# Patient Record
Sex: FEMALE | Race: WHITE | Employment: UNEMPLOYED | URBAN - METROPOLITAN AREA
[De-identification: names, ages, dates, MRNs, and addresses within clinical notes are randomized per-mention and may not be internally consistent; named-entity substitution may affect disease eponyms.]

---

## 2017-03-29 PROBLEM — R10.2 CHRONIC PELVIC PAIN IN FEMALE: Status: ACTIVE | Noted: 2017-03-29

## 2017-03-29 PROBLEM — G89.29 CHRONIC PELVIC PAIN IN FEMALE: Status: ACTIVE | Noted: 2017-03-29

## 2018-03-14 ENCOUNTER — HOSPITAL ENCOUNTER (OUTPATIENT)
Dept: PSYCHIATRY | Age: 38
Setting detail: THERAPIES SERIES
Discharge: HOME | End: 2018-03-14
Payer: MEDICAID

## 2018-03-14 PROCEDURE — 90853 GROUP PSYCHOTHERAPY: CPT | Performed by: COUNSELOR

## 2018-03-21 ENCOUNTER — APPOINTMENT (OUTPATIENT)
Dept: PSYCHIATRY | Age: 38
End: 2018-03-21
Payer: MEDICAID

## 2018-03-28 ENCOUNTER — APPOINTMENT (OUTPATIENT)
Dept: PSYCHIATRY | Age: 38
End: 2018-03-28
Payer: MEDICAID

## 2018-08-07 ENCOUNTER — HOSPITAL ENCOUNTER (OUTPATIENT)
Age: 38
Discharge: HOME | End: 2018-08-07
Payer: MEDICAID

## 2018-08-07 LAB
ALBUMIN SERPL-MCNC: 3.8 G/DL (ref 3.5–5.2)
ALP BLD-CCNC: 100 U/L (ref 35–104)
ALT SERPL-CCNC: 25 U/L (ref 0–32)
ANION GAP SERPL CALCULATED.3IONS-SCNC: 8 MMOL/L (ref 7–16)
AST SERPL-CCNC: 17 U/L (ref 0–31)
BASOPHILS ABSOLUTE: 0.04 E9/L (ref 0–0.2)
BASOPHILS RELATIVE PERCENT: 0.6 % (ref 0–2)
BILIRUB SERPL-MCNC: 0.2 MG/DL (ref 0–1.2)
BILIRUBIN URINE: NEGATIVE
BLOOD, URINE: NEGATIVE
BUN BLDV-MCNC: 9 MG/DL (ref 6–20)
CALCIUM SERPL-MCNC: 9.1 MG/DL (ref 8.6–10.2)
CHLORIDE BLD-SCNC: 104 MMOL/L (ref 98–107)
CHOLESTEROL, FASTING: 162 MG/DL (ref 0–199)
CLARITY: CLEAR
CO2: 27 MMOL/L (ref 22–29)
COLOR: YELLOW
CREAT SERPL-MCNC: 1.1 MG/DL (ref 0.5–1)
EOSINOPHILS ABSOLUTE: 0.09 E9/L (ref 0.05–0.5)
EOSINOPHILS RELATIVE PERCENT: 1.3 % (ref 0–6)
GFR AFRICAN AMERICAN: >60
GFR NON-AFRICAN AMERICAN: 55 ML/MIN/1.73
GLUCOSE FASTING: 114 MG/DL (ref 74–109)
GLUCOSE URINE: NEGATIVE MG/DL
HBA1C MFR BLD: 6.1 % (ref 4–5.6)
HCT VFR BLD CALC: 40.5 % (ref 34–48)
HDLC SERPL-MCNC: 37 MG/DL
HEMOGLOBIN: 13.4 G/DL (ref 11.5–15.5)
IMMATURE GRANULOCYTES #: 0.03 E9/L
IMMATURE GRANULOCYTES %: 0.4 % (ref 0–5)
KETONES, URINE: NEGATIVE MG/DL
LDL CHOLESTEROL CALCULATED: 106 MG/DL (ref 0–99)
LEUKOCYTE ESTERASE, URINE: NEGATIVE
LYMPHOCYTES ABSOLUTE: 1.48 E9/L (ref 1.5–4)
LYMPHOCYTES RELATIVE PERCENT: 22.2 % (ref 20–42)
MCH RBC QN AUTO: 29.6 PG (ref 26–35)
MCHC RBC AUTO-ENTMCNC: 33.1 % (ref 32–34.5)
MCV RBC AUTO: 89.4 FL (ref 80–99.9)
MONOCYTES ABSOLUTE: 0.4 E9/L (ref 0.1–0.95)
MONOCYTES RELATIVE PERCENT: 6 % (ref 2–12)
NEUTROPHILS ABSOLUTE: 4.64 E9/L (ref 1.8–7.3)
NEUTROPHILS RELATIVE PERCENT: 69.5 % (ref 43–80)
NITRITE, URINE: NEGATIVE
PDW BLD-RTO: 13.9 FL (ref 11.5–15)
PH UA: 6 (ref 5–9)
PLATELET # BLD: 270 E9/L (ref 130–450)
PMV BLD AUTO: 10.6 FL (ref 7–12)
POTASSIUM SERPL-SCNC: 4.4 MMOL/L (ref 3.5–5)
PROTEIN UA: NEGATIVE MG/DL
RBC # BLD: 4.53 E12/L (ref 3.5–5.5)
SODIUM BLD-SCNC: 139 MMOL/L (ref 132–146)
SPECIFIC GRAVITY UA: 1.01 (ref 1–1.03)
T3 TOTAL: 122.7 NG/DL (ref 80–200)
T4 TOTAL: 5.7 MCG/DL (ref 4.5–11.7)
TOTAL PROTEIN: 6.7 G/DL (ref 6.4–8.3)
TRIGLYCERIDE, FASTING: 97 MG/DL (ref 0–149)
TSH SERPL DL<=0.05 MIU/L-ACNC: 1.84 UIU/ML (ref 0.27–4.2)
UROBILINOGEN, URINE: 0.2 E.U./DL
VITAMIN B-12: 297 PG/ML (ref 211–946)
VITAMIN D 25-HYDROXY: 18 NG/ML (ref 30–100)
VLDLC SERPL CALC-MCNC: 19 MG/DL
WBC # BLD: 6.7 E9/L (ref 4.5–11.5)

## 2018-08-07 PROCEDURE — 82607 VITAMIN B-12: CPT

## 2018-08-07 PROCEDURE — 80053 COMPREHEN METABOLIC PANEL: CPT

## 2018-08-07 PROCEDURE — 84443 ASSAY THYROID STIM HORMONE: CPT

## 2018-08-07 PROCEDURE — 83036 HEMOGLOBIN GLYCOSYLATED A1C: CPT

## 2018-08-07 PROCEDURE — 80061 LIPID PANEL: CPT

## 2018-08-07 PROCEDURE — 84480 ASSAY TRIIODOTHYRONINE (T3): CPT

## 2018-08-07 PROCEDURE — 36415 COLL VENOUS BLD VENIPUNCTURE: CPT

## 2018-08-07 PROCEDURE — 85025 COMPLETE CBC W/AUTO DIFF WBC: CPT

## 2018-08-07 PROCEDURE — 82306 VITAMIN D 25 HYDROXY: CPT

## 2018-08-07 PROCEDURE — 84436 ASSAY OF TOTAL THYROXINE: CPT

## 2018-08-07 PROCEDURE — 81003 URINALYSIS AUTO W/O SCOPE: CPT

## 2018-10-12 ENCOUNTER — HOSPITAL ENCOUNTER (EMERGENCY)
Age: 38
Discharge: HOME | End: 2018-10-12
Attending: EMERGENCY MEDICINE
Payer: MEDICAID

## 2018-10-12 VITALS
TEMPERATURE: 98.1 F | DIASTOLIC BLOOD PRESSURE: 80 MMHG | HEIGHT: 61 IN | SYSTOLIC BLOOD PRESSURE: 132 MMHG | HEART RATE: 92 BPM | RESPIRATION RATE: 18 BRPM | BODY MASS INDEX: 43.23 KG/M2 | OXYGEN SATURATION: 97 % | WEIGHT: 229 LBS

## 2018-10-12 DIAGNOSIS — M76.51 PATELLAR TENDONITIS OF RIGHT KNEE: ICD-10-CM

## 2018-10-12 DIAGNOSIS — R21 RASH AND OTHER NONSPECIFIC SKIN ERUPTION: Primary | ICD-10-CM

## 2018-10-12 PROCEDURE — 99282 EMERGENCY DEPT VISIT SF MDM: CPT

## 2018-10-12 PROCEDURE — G0381 LEV 2 HOSP TYPE B ED VISIT: HCPCS

## 2018-10-12 RX ORDER — IBUPROFEN 600 MG/1
600 TABLET ORAL EVERY 6 HOURS PRN
Qty: 120 TABLET | Refills: 0 | Status: SHIPPED | OUTPATIENT
Start: 2018-10-12 | End: 2019-01-09

## 2018-10-12 NOTE — ED PROVIDER NOTES
smokeless tobacco. She reports that she does not drink alcohol or use drugs. Family History: family history includes Diabetes in her father; Heart Disease in her father and mother; High Blood Pressure in her father; Kidney Disease in her father; Other in her mother; Stroke in her father. Allergies: Patient has no known allergies. Physical Exam           ED Triage Vitals [10/12/18 1743]   BP Temp Temp src Pulse Resp SpO2 Height Weight   -- 98.1 °F (36.7 °C) -- 92 18 97 % 5' 1\" (1.549 m) 229 lb (103.9 kg)     Oxygen Saturation Interpretation: Normal.    Constitutional:  Alert, development consistent with age. HEENT:  NC/NT. Airway patent. Eyes:  PERRL, EOMI, no discharge. Ears:  TMs without perforation, injection, or bulging. External canals clear without exudate. Mouth:  Mucous membranes moist without lesions, tongue and gums normal.  Throat:  Pharynx without injection, exudate, or tonsillar hypertrophy. Airway patient. Neck:  Supple. No lymphadenopathy. Respiratory:  Clear to auscultation and breath sounds equal.  CV:  Regular rate and rhythm. GI:  Abdomen Soft, nontender, +BS. Integument:  Skin turgor: Normal.              SMALL MACUPAPULAR LESIONS LEFT FORARM, ABDOMEN, AND BEHIND HER KNEE , NO HIVES, DRY MILD ERYTHEMATOUS. RIGHT KNEE TENDER BELOW KNEE CAP OVER PATELLAR TENDON   Neurological:  Orientation age-appropriate unless noted elseware. Motor functions intact. Lab / Imaging Results   (All laboratory and radiology results have been personally reviewed by myself)  Labs:  No results found for this visit on 10/12/18. Imaging: All Radiology results interpreted by Radiologist unless otherwise noted. No orders to display       ED Course / Medical Decision Making   Medications - No data to display     Consults:   None    Procedures:   none    MDM:       Counseling:     The emergency provider has spoken with the patient and discussed todays results, in addition to providing specific details for the plan of care and counseling regarding the diagnosis and prognosis. Questions are answered at this time and they are agreeable with the planAND TREATMENT  Assessment      1. Rash and other nonspecific skin eruption    2. Patellar tendonitis of right knee      Plan   Discharge to home  Patient condition is stable    New Medications     New Prescriptions    No medications on file     Electronically signed by Trinity Clancy MD   DD: 10/12/18  **This report was transcribed using voice recognition software. Every effort was made to ensure accuracy; however, inadvertent computerized transcription errors may be present.   END OF ED PROVIDER NOTE     Trinity Clancy MD  10/12/18 0844

## 2018-11-16 ENCOUNTER — HOSPITAL ENCOUNTER (OUTPATIENT)
Age: 38
Discharge: HOME | End: 2018-11-16
Payer: MEDICAID

## 2018-11-16 LAB
ALBUMIN SERPL-MCNC: 4.1 G/DL (ref 3.5–5.2)
ALP BLD-CCNC: 99 U/L (ref 35–104)
ALT SERPL-CCNC: 19 U/L (ref 0–32)
ANION GAP SERPL CALCULATED.3IONS-SCNC: 7 MMOL/L (ref 7–16)
AST SERPL-CCNC: 16 U/L (ref 0–31)
BILIRUB SERPL-MCNC: 0.3 MG/DL (ref 0–1.2)
BUN BLDV-MCNC: 11 MG/DL (ref 6–20)
CALCIUM SERPL-MCNC: 9.4 MG/DL (ref 8.6–10.2)
CHLORIDE BLD-SCNC: 102 MMOL/L (ref 98–107)
CO2: 28 MMOL/L (ref 22–29)
CREAT SERPL-MCNC: 1.1 MG/DL (ref 0.5–1)
GFR AFRICAN AMERICAN: >60
GFR NON-AFRICAN AMERICAN: 55 ML/MIN/1.73
GLUCOSE BLD-MCNC: 112 MG/DL (ref 74–99)
POTASSIUM SERPL-SCNC: 4.7 MMOL/L (ref 3.5–5)
SODIUM BLD-SCNC: 137 MMOL/L (ref 132–146)
TOTAL PROTEIN: 7.3 G/DL (ref 6.4–8.3)

## 2018-11-16 PROCEDURE — 36415 COLL VENOUS BLD VENIPUNCTURE: CPT

## 2018-11-16 PROCEDURE — 80053 COMPREHEN METABOLIC PANEL: CPT

## 2018-12-13 ENCOUNTER — HOSPITAL ENCOUNTER (OUTPATIENT)
Age: 38
Discharge: HOME | End: 2018-12-15
Payer: MEDICAID

## 2018-12-13 ENCOUNTER — HOSPITAL ENCOUNTER (OUTPATIENT)
Dept: GENERAL RADIOLOGY | Age: 38
Discharge: HOME | End: 2018-12-15
Payer: MEDICAID

## 2018-12-13 DIAGNOSIS — G89.29 CHRONIC HAND PAIN, RIGHT: ICD-10-CM

## 2018-12-13 DIAGNOSIS — M79.641 CHRONIC HAND PAIN, RIGHT: ICD-10-CM

## 2018-12-13 PROCEDURE — 73130 X-RAY EXAM OF HAND: CPT

## 2018-12-19 ENCOUNTER — HOSPITAL ENCOUNTER (OUTPATIENT)
Dept: MAMMOGRAPHY | Age: 38
Discharge: HOME | End: 2018-12-21
Payer: MEDICAID

## 2018-12-19 DIAGNOSIS — Z12.39 SCREENING FOR BREAST CANCER: ICD-10-CM

## 2018-12-19 PROCEDURE — 77067 SCR MAMMO BI INCL CAD: CPT

## 2019-01-09 ENCOUNTER — HOSPITAL ENCOUNTER (EMERGENCY)
Age: 39
Discharge: HOME | End: 2019-01-09
Attending: EMERGENCY MEDICINE
Payer: MEDICAID

## 2019-01-09 ENCOUNTER — APPOINTMENT (OUTPATIENT)
Dept: GENERAL RADIOLOGY | Age: 39
End: 2019-01-09
Payer: MEDICAID

## 2019-01-09 VITALS
HEART RATE: 94 BPM | TEMPERATURE: 97.9 F | RESPIRATION RATE: 18 BRPM | OXYGEN SATURATION: 95 % | BODY MASS INDEX: 41.95 KG/M2 | DIASTOLIC BLOOD PRESSURE: 87 MMHG | WEIGHT: 222 LBS | SYSTOLIC BLOOD PRESSURE: 147 MMHG

## 2019-01-09 DIAGNOSIS — S73.101A SPRAIN OF RIGHT HIP, INITIAL ENCOUNTER: Primary | ICD-10-CM

## 2019-01-09 DIAGNOSIS — S70.01XA CONTUSION OF RIGHT HIP, INITIAL ENCOUNTER: ICD-10-CM

## 2019-01-09 PROCEDURE — G0382 LEV 3 HOSP TYPE B ED VISIT: HCPCS

## 2019-01-09 PROCEDURE — 99283 EMERGENCY DEPT VISIT LOW MDM: CPT

## 2019-01-09 PROCEDURE — 73502 X-RAY EXAM HIP UNI 2-3 VIEWS: CPT

## 2019-01-09 PROCEDURE — 6360000002 HC RX W HCPCS: Performed by: EMERGENCY MEDICINE

## 2019-01-09 PROCEDURE — 96372 THER/PROPH/DIAG INJ SC/IM: CPT

## 2019-01-09 RX ORDER — CYCLOBENZAPRINE HCL 10 MG
10 TABLET ORAL 3 TIMES DAILY PRN
Qty: 15 TABLET | Refills: 0 | Status: SHIPPED | OUTPATIENT
Start: 2019-01-09 | End: 2019-01-14

## 2019-01-09 RX ORDER — INDOMETHACIN 50 MG/1
50 CAPSULE ORAL
Qty: 21 CAPSULE | Refills: 0 | Status: SHIPPED | OUTPATIENT
Start: 2019-01-09 | End: 2019-09-20

## 2019-01-09 RX ORDER — KETOROLAC TROMETHAMINE 30 MG/ML
60 INJECTION, SOLUTION INTRAMUSCULAR; INTRAVENOUS ONCE
Status: COMPLETED | OUTPATIENT
Start: 2019-01-09 | End: 2019-01-09

## 2019-01-09 RX ORDER — METFORMIN HYDROCHLORIDE 500 MG/1
500 TABLET, FILM COATED, EXTENDED RELEASE ORAL
COMMUNITY

## 2019-01-09 RX ORDER — NAPROXEN 500 MG/1
500 TABLET ORAL 2 TIMES DAILY WITH MEALS
COMMUNITY
End: 2019-11-18

## 2019-01-09 RX ADMIN — KETOROLAC TROMETHAMINE 60 MG: 30 INJECTION, SOLUTION INTRAMUSCULAR at 15:44

## 2019-01-09 ASSESSMENT — ENCOUNTER SYMPTOMS
SINUS PRESSURE: 0
BACK PAIN: 0
VOMITING: 0
ABDOMINAL DISTENTION: 0
BLOOD IN STOOL: 0
SORE THROAT: 0
EYE REDNESS: 0
DIARRHEA: 0
COUGH: 0
NAUSEA: 0
EYE DISCHARGE: 0
EYE PAIN: 0
ABDOMINAL PAIN: 0
SHORTNESS OF BREATH: 0
WHEEZING: 0

## 2019-01-09 ASSESSMENT — PAIN - FUNCTIONAL ASSESSMENT: PAIN_FUNCTIONAL_ASSESSMENT: PREVENTS OR INTERFERES WITH MANY ACTIVE NOT PASSIVE ACTIVITIES

## 2019-01-09 ASSESSMENT — PAIN DESCRIPTION - PAIN TYPE: TYPE: ACUTE PAIN

## 2019-01-09 ASSESSMENT — PAIN SCALES - GENERAL
PAINLEVEL_OUTOF10: 5
PAINLEVEL_OUTOF10: 5

## 2019-01-09 ASSESSMENT — PAIN DESCRIPTION - FREQUENCY: FREQUENCY: CONTINUOUS

## 2019-01-09 ASSESSMENT — PAIN DESCRIPTION - LOCATION: LOCATION: HIP

## 2019-01-09 ASSESSMENT — PAIN DESCRIPTION - PROGRESSION: CLINICAL_PROGRESSION: NOT CHANGED

## 2019-01-09 ASSESSMENT — PAIN DESCRIPTION - ORIENTATION: ORIENTATION: RIGHT

## 2019-01-09 ASSESSMENT — PAIN DESCRIPTION - DESCRIPTORS: DESCRIPTORS: ACHING;SORE

## 2019-02-28 ENCOUNTER — HOSPITAL ENCOUNTER (OUTPATIENT)
Age: 39
Discharge: HOME | End: 2019-02-28
Payer: MEDICAID

## 2019-02-28 LAB
ALBUMIN SERPL-MCNC: 3.7 G/DL (ref 3.5–5.2)
ALP BLD-CCNC: 91 U/L (ref 35–104)
ALT SERPL-CCNC: 15 U/L (ref 0–32)
ANION GAP SERPL CALCULATED.3IONS-SCNC: 10 MMOL/L (ref 7–16)
AST SERPL-CCNC: 14 U/L (ref 0–31)
BASOPHILS ABSOLUTE: 0.05 E9/L (ref 0–0.2)
BASOPHILS RELATIVE PERCENT: 0.6 % (ref 0–2)
BILIRUB SERPL-MCNC: <0.2 MG/DL (ref 0–1.2)
BUN BLDV-MCNC: 11 MG/DL (ref 6–20)
CALCIUM SERPL-MCNC: 9.3 MG/DL (ref 8.6–10.2)
CHLORIDE BLD-SCNC: 101 MMOL/L (ref 98–107)
CHOLESTEROL, TOTAL: 156 MG/DL (ref 0–199)
CO2: 29 MMOL/L (ref 22–29)
CREAT SERPL-MCNC: 0.9 MG/DL (ref 0.5–1)
EOSINOPHILS ABSOLUTE: 0.1 E9/L (ref 0.05–0.5)
EOSINOPHILS RELATIVE PERCENT: 1.2 % (ref 0–6)
GFR AFRICAN AMERICAN: >60
GFR NON-AFRICAN AMERICAN: >60 ML/MIN/1.73
GLUCOSE BLD-MCNC: 92 MG/DL (ref 74–99)
HBA1C MFR BLD: 5.7 % (ref 4–5.6)
HCT VFR BLD CALC: 40.9 % (ref 34–48)
HDLC SERPL-MCNC: 45 MG/DL
HEMOGLOBIN: 13.2 G/DL (ref 11.5–15.5)
IMMATURE GRANULOCYTES #: 0.04 E9/L
IMMATURE GRANULOCYTES %: 0.5 % (ref 0–5)
LDL CHOLESTEROL CALCULATED: 79 MG/DL (ref 0–99)
LYMPHOCYTES ABSOLUTE: 2.09 E9/L (ref 1.5–4)
LYMPHOCYTES RELATIVE PERCENT: 24.2 % (ref 20–42)
MCH RBC QN AUTO: 29.8 PG (ref 26–35)
MCHC RBC AUTO-ENTMCNC: 32.3 % (ref 32–34.5)
MCV RBC AUTO: 92.3 FL (ref 80–99.9)
MONOCYTES ABSOLUTE: 0.56 E9/L (ref 0.1–0.95)
MONOCYTES RELATIVE PERCENT: 6.5 % (ref 2–12)
NEUTROPHILS ABSOLUTE: 5.79 E9/L (ref 1.8–7.3)
NEUTROPHILS RELATIVE PERCENT: 67 % (ref 43–80)
PDW BLD-RTO: 13.5 FL (ref 11.5–15)
PLATELET # BLD: 279 E9/L (ref 130–450)
PMV BLD AUTO: 10.7 FL (ref 7–12)
POTASSIUM SERPL-SCNC: 3.9 MMOL/L (ref 3.5–5)
RBC # BLD: 4.43 E12/L (ref 3.5–5.5)
SODIUM BLD-SCNC: 140 MMOL/L (ref 132–146)
TOTAL PROTEIN: 6.8 G/DL (ref 6.4–8.3)
TRIGL SERPL-MCNC: 159 MG/DL (ref 0–149)
VLDLC SERPL CALC-MCNC: 32 MG/DL
WBC # BLD: 8.6 E9/L (ref 4.5–11.5)

## 2019-02-28 PROCEDURE — 36415 COLL VENOUS BLD VENIPUNCTURE: CPT

## 2019-02-28 PROCEDURE — 85025 COMPLETE CBC W/AUTO DIFF WBC: CPT

## 2019-02-28 PROCEDURE — 83036 HEMOGLOBIN GLYCOSYLATED A1C: CPT

## 2019-02-28 PROCEDURE — 80061 LIPID PANEL: CPT

## 2019-02-28 PROCEDURE — 80053 COMPREHEN METABOLIC PANEL: CPT

## 2019-06-27 ENCOUNTER — OFFICE VISIT (OUTPATIENT)
Dept: ORTHOPEDIC SURGERY | Age: 39
End: 2019-06-27
Payer: MEDICAID

## 2019-06-27 VITALS
DIASTOLIC BLOOD PRESSURE: 95 MMHG | RESPIRATION RATE: 18 BRPM | SYSTOLIC BLOOD PRESSURE: 150 MMHG | HEART RATE: 89 BPM | TEMPERATURE: 98.4 F

## 2019-06-27 DIAGNOSIS — G56.01 CARPAL TUNNEL SYNDROME OF RIGHT WRIST: ICD-10-CM

## 2019-06-27 DIAGNOSIS — M77.8 TENDINITIS OF RIGHT WRIST: ICD-10-CM

## 2019-06-27 DIAGNOSIS — S69.81XA TFCC (TRIANGULAR FIBROCARTILAGE COMPLEX) INJURY, RIGHT, INITIAL ENCOUNTER: ICD-10-CM

## 2019-06-27 DIAGNOSIS — M25.531 RIGHT WRIST PAIN: Primary | ICD-10-CM

## 2019-06-27 PROCEDURE — 99203 OFFICE O/P NEW LOW 30 MIN: CPT | Performed by: ORTHOPAEDIC SURGERY

## 2019-06-27 PROCEDURE — 4004F PT TOBACCO SCREEN RCVD TLK: CPT | Performed by: ORTHOPAEDIC SURGERY

## 2019-06-27 PROCEDURE — G8417 CALC BMI ABV UP PARAM F/U: HCPCS | Performed by: ORTHOPAEDIC SURGERY

## 2019-06-27 PROCEDURE — G8427 DOCREV CUR MEDS BY ELIG CLIN: HCPCS | Performed by: ORTHOPAEDIC SURGERY

## 2019-06-27 RX ORDER — CETIRIZINE HYDROCHLORIDE 10 MG/1
10 TABLET ORAL NIGHTLY
COMMUNITY
End: 2021-04-13 | Stop reason: ALTCHOICE

## 2019-06-27 RX ORDER — IBUPROFEN 800 MG/1
800 TABLET ORAL EVERY 6 HOURS PRN
COMMUNITY
End: 2019-11-18

## 2019-06-27 NOTE — LETTER
[facility].  [address] [address]  Phone: [phone]  Fax: [phone]    Charles Wallace MD        June 27, 2019     Patient: Vickie Villarreal   YOB: 1980   Date of Visit: 6/27/2019       To Whom It May Concern: It is my medical opinion that Vickie Villarreal should remain off until cleared due to wrist injury. If you have any questions or concerns, please don't hesitate to call.     Sincerely,        Charles Wallace MD

## 2019-06-27 NOTE — PROGRESS NOTES
Department of Orthopedic Surgery  [facility] Rodney Castillo MD  History and Physical      CHIEF COMPLAINT: Right wrist pain    HISTORY OF PRESENT ILLNESS:                The patient is a 34 y.o. female who presents with worsening right wrist pain. She is right-hand dominant . She reports insidious onset of numbness and tingling in the right hand 6 months ago. Recently about 2 months ago she developed increasing pain and swelling over the dorsal ulnar aspect of the wrist which has been recalcitrant to oral NSAIDs and steroids as well as a brace. She did have x-rays back in 2018 which were reviewed. X-rays were negative for any fractures or dislocations. The DRUJ is well preserved. .    Past Medical History:        Diagnosis Date    Anxiety     Depression     Herpes genitalia     Kidney stones     Lower back pain     ruptured discs, lumbar     Past Surgical History:        Procedure Laterality Date     SECTION      COLONOSCOPY      CYSTOSCOPY      DILATION AND CURETTAGE OF UTERUS      ENDOMETRIAL ABLATION      HYSTEROSCOPY  2017    D & C    LEEP      LITHOTRIPSY      OTHER SURGICAL HISTORY Bilateral 2017    Total Hysterectomy Bilateral Salpingectomy     TUBAL LIGATION       Current Medications:   No current facility-administered medications for this visit. Allergies:  Patient has no known allergies. Social History:   TOBACCO:   reports that she has been smoking cigarettes. She has a 12.50 pack-year smoking history. She has never used smokeless tobacco.  ETOH:   reports that she does not drink alcohol. DRUGS:   reports that she does not use drugs.   ACTIVITIES OF DAILY LIVING:    OCCUPATION:    Family History:       Problem Relation Age of Onset    Heart Disease Mother     Other Mother         cirrhosis liver, non alcoholic    Diabetes Father     Kidney Disease Father         dialysis    Stroke Father     High Blood Pressure Father     Heart Disease Father the mid range as well as pronation. Stable and supination. Radial, ulnar, median nerves were grossly intact. Negative atrophy. Negative Wartenberg's cross finger testing. APB strength 5/5. DATA:    CBC:   Lab Results   Component Value Date    WBC 8.6 02/28/2019    RBC 4.43 02/28/2019    HGB 13.2 02/28/2019    HCT 40.9 02/28/2019    MCV 92.3 02/28/2019    MCH 29.8 02/28/2019    MCHC 32.3 02/28/2019    RDW 13.5 02/28/2019     02/28/2019    MPV 10.7 02/28/2019     PT/INR:  No results found for: PROTIME, INR    Radiology Review: X-rays from December 13, 2018 were reviewed. Negative for acute fracture dislocations. IMPRESSION:  · Probable right carpal tunnel syndrome  · Right TFCC tear and DRUJ instability  · ECU tendinitis with subluxation  ·     PLAN:  Today's findings were explained the patient. Cortisone injection was offered and accepted in place over the TFCC. A brace was fitted to the patient today in the office. EMG and nerve conduction was ordered. Discussed obtaining an MRI if symptoms remain recalcitrant for possible surgical intervention in form of wrist arthroscopy, TFCC open repair with DRUJ stabilization and possible ECU stabilization. Anticipate 3 to 6 months of recovery prior to return to work as a . She voiced understanding. Procedure Note Wrist Cortisone Injection    The right wrist TFCC was identified as the injection site. The risk and benefits of a cortisone injection were explained and the patient consented to the injection. Under sterile conditions, the wrist was injected with a mixture of 1 mL of 1% Lidocaine and 1 mL of 6 mg/mL Betamethasone without complication. A sterile bandage was applied.

## 2019-07-19 ENCOUNTER — OFFICE VISIT (OUTPATIENT)
Dept: PHYSICAL MEDICINE AND REHAB | Age: 39
End: 2019-07-19
Payer: MEDICAID

## 2019-07-19 VITALS — WEIGHT: 214 LBS | HEIGHT: 61 IN | BODY MASS INDEX: 40.4 KG/M2

## 2019-07-19 DIAGNOSIS — M25.531 RIGHT WRIST PAIN: ICD-10-CM

## 2019-07-19 PROCEDURE — 95886 MUSC TEST DONE W/N TEST COMP: CPT | Performed by: PHYSICAL MEDICINE & REHABILITATION

## 2019-07-19 PROCEDURE — 4004F PT TOBACCO SCREEN RCVD TLK: CPT | Performed by: PHYSICAL MEDICINE & REHABILITATION

## 2019-07-19 PROCEDURE — G8428 CUR MEDS NOT DOCUMENT: HCPCS | Performed by: PHYSICAL MEDICINE & REHABILITATION

## 2019-07-19 PROCEDURE — 99202 OFFICE O/P NEW SF 15 MIN: CPT | Performed by: PHYSICAL MEDICINE & REHABILITATION

## 2019-07-19 PROCEDURE — 95909 NRV CNDJ TST 5-6 STUDIES: CPT | Performed by: PHYSICAL MEDICINE & REHABILITATION

## 2019-07-19 PROCEDURE — G8417 CALC BMI ABV UP PARAM F/U: HCPCS | Performed by: PHYSICAL MEDICINE & REHABILITATION

## 2019-07-19 NOTE — PROGRESS NOTES
[facility]  Electrodiagnostic Laboratory  *Accredited by the [address] [place] [address] with exemplary status  [address] [facility]  Phone: [phone]  Fax: [phone]      Date of Examination: 07/19/19  Patient Name: Mayda Plaza  is a 34y.o. 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 Physical Exam: MSK: There is no joint effusion, deformity, instability, swelling, erythema or warmth. AROM is full in the spine and extremities. +Tinel R wrist. Neurologic:  No focal sensorimotor deficit. Reflexes 2+ and symmetric. Gait is normal.    Impression:   1. Right wrist pain        Plan:   · EMG is indicated to evaluate the above diagnosis. Orders Placed This Encounter   Procedures    MS NEEDLE EMG EA EXTREMTY W/PARASPINL AREA COMPLETE    MS MOTOR &/SENS 5-6 NRV CNDJ PRECONF ELTRODE LIMB     · EMG was done today and showed right carpal tunnel syndrome. The patient was educated about the diagnosis and the prognosis. · Recommend neutral wrist splints at h.s., OT and/or carpal tunnel injection and if no improvement after 4-6 weeks of conservative treatments consider orthopedic surgery evaluation. Recommend repeating the EMG in 1 year if symptoms persist.    · Advised patient to follow up with referring provider. Thank you for allowing me to participate in the care of your patient.       Sincerely,     Eamon Rae

## 2019-08-13 ENCOUNTER — OFFICE VISIT (OUTPATIENT)
Dept: ORTHOPEDIC SURGERY | Age: 39
End: 2019-08-13
Payer: MEDICAID

## 2019-08-13 VITALS — RESPIRATION RATE: 18 BRPM | SYSTOLIC BLOOD PRESSURE: 131 MMHG | HEART RATE: 82 BPM | DIASTOLIC BLOOD PRESSURE: 93 MMHG

## 2019-08-13 DIAGNOSIS — S69.81XA TFCC (TRIANGULAR FIBROCARTILAGE COMPLEX) INJURY, RIGHT, INITIAL ENCOUNTER: ICD-10-CM

## 2019-08-13 DIAGNOSIS — M25.531 RIGHT WRIST PAIN: Primary | ICD-10-CM

## 2019-08-13 DIAGNOSIS — M77.8 TENDINITIS OF RIGHT WRIST: ICD-10-CM

## 2019-08-13 DIAGNOSIS — G56.01 CARPAL TUNNEL SYNDROME OF RIGHT WRIST: ICD-10-CM

## 2019-08-13 PROCEDURE — 99214 OFFICE O/P EST MOD 30 MIN: CPT | Performed by: ORTHOPAEDIC SURGERY

## 2019-08-13 PROCEDURE — G8417 CALC BMI ABV UP PARAM F/U: HCPCS | Performed by: ORTHOPAEDIC SURGERY

## 2019-08-13 PROCEDURE — G8427 DOCREV CUR MEDS BY ELIG CLIN: HCPCS | Performed by: ORTHOPAEDIC SURGERY

## 2019-08-13 PROCEDURE — 4004F PT TOBACCO SCREEN RCVD TLK: CPT | Performed by: ORTHOPAEDIC SURGERY

## 2019-08-13 RX ORDER — RANITIDINE 150 MG/1
TABLET ORAL
Refills: 3 | COMMUNITY
Start: 2019-08-08 | End: 2020-05-21 | Stop reason: ALTCHOICE

## 2019-09-20 RX ORDER — BUPROPION HYDROCHLORIDE 300 MG/1
1 TABLET ORAL
COMMUNITY
End: 2021-04-13 | Stop reason: ALTCHOICE

## 2019-09-20 RX ORDER — BUPROPION HYDROCHLORIDE 150 MG/1
1 TABLET ORAL
COMMUNITY
End: 2021-04-13 | Stop reason: ALTCHOICE

## 2019-09-24 ENCOUNTER — PREP FOR PROCEDURE (OUTPATIENT)
Dept: ORTHOPEDIC SURGERY | Age: 39
End: 2019-09-24

## 2019-09-24 RX ORDER — SODIUM CHLORIDE 0.9 % (FLUSH) 0.9 %
10 SYRINGE (ML) INJECTION PRN
Status: CANCELLED | OUTPATIENT
Start: 2019-09-24

## 2019-09-24 RX ORDER — SODIUM CHLORIDE 9 MG/ML
INJECTION, SOLUTION INTRAVENOUS CONTINUOUS
Status: CANCELLED | OUTPATIENT
Start: 2019-09-24

## 2019-09-24 RX ORDER — SODIUM CHLORIDE 0.9 % (FLUSH) 0.9 %
10 SYRINGE (ML) INJECTION EVERY 12 HOURS SCHEDULED
Status: CANCELLED | OUTPATIENT
Start: 2019-09-24

## 2019-09-27 ENCOUNTER — ANESTHESIA EVENT (OUTPATIENT)
Dept: OPERATING ROOM | Age: 39
End: 2019-09-27
Payer: MEDICAID

## 2019-09-27 ENCOUNTER — ANESTHESIA (OUTPATIENT)
Dept: OPERATING ROOM | Age: 39
End: 2019-09-27
Payer: MEDICAID

## 2019-09-27 ENCOUNTER — HOSPITAL ENCOUNTER (OUTPATIENT)
Age: 39
Setting detail: OUTPATIENT SURGERY
Discharge: HOME | End: 2019-09-27
Attending: ORTHOPAEDIC SURGERY | Admitting: ORTHOPAEDIC SURGERY
Payer: MEDICAID

## 2019-09-27 VITALS
WEIGHT: 223 LBS | OXYGEN SATURATION: 93 % | DIASTOLIC BLOOD PRESSURE: 67 MMHG | TEMPERATURE: 96.4 F | HEIGHT: 61 IN | BODY MASS INDEX: 42.1 KG/M2 | SYSTOLIC BLOOD PRESSURE: 113 MMHG | HEART RATE: 81 BPM | RESPIRATION RATE: 18 BRPM

## 2019-09-27 VITALS — DIASTOLIC BLOOD PRESSURE: 82 MMHG | TEMPERATURE: 95.9 F | OXYGEN SATURATION: 97 % | SYSTOLIC BLOOD PRESSURE: 125 MMHG

## 2019-09-27 DIAGNOSIS — G89.18 POST-OPERATIVE PAIN: Primary | ICD-10-CM

## 2019-09-27 LAB — METER GLUCOSE: 109 MG/DL (ref 74–99)

## 2019-09-27 PROCEDURE — 6360000002 HC RX W HCPCS

## 2019-09-27 PROCEDURE — 6360000002 HC RX W HCPCS: Performed by: NURSE ANESTHETIST, CERTIFIED REGISTERED

## 2019-09-27 PROCEDURE — 3600000013 HC SURGERY LEVEL 3 ADDTL 15MIN: Performed by: ORTHOPAEDIC SURGERY

## 2019-09-27 PROCEDURE — 7100000000 HC PACU RECOVERY - FIRST 15 MIN: Performed by: ORTHOPAEDIC SURGERY

## 2019-09-27 PROCEDURE — 29848 WRIST ENDOSCOPY/SURGERY: CPT | Performed by: ORTHOPAEDIC SURGERY

## 2019-09-27 PROCEDURE — 3700000001 HC ADD 15 MINUTES (ANESTHESIA): Performed by: ORTHOPAEDIC SURGERY

## 2019-09-27 PROCEDURE — 2580000003 HC RX 258: Performed by: PHYSICIAN ASSISTANT

## 2019-09-27 PROCEDURE — 25337 RECONSTRUCT ULNA/RADIOULNAR: CPT | Performed by: ORTHOPAEDIC SURGERY

## 2019-09-27 PROCEDURE — 2500000003 HC RX 250 WO HCPCS: Performed by: NURSE ANESTHETIST, CERTIFIED REGISTERED

## 2019-09-27 PROCEDURE — 6360000002 HC RX W HCPCS: Performed by: ANESTHESIOLOGY

## 2019-09-27 PROCEDURE — 6360000002 HC RX W HCPCS: Performed by: PHYSICIAN ASSISTANT

## 2019-09-27 PROCEDURE — 3600000003 HC SURGERY LEVEL 3 BASE: Performed by: ORTHOPAEDIC SURGERY

## 2019-09-27 PROCEDURE — 25320 REPAIR/REVISE WRIST JOINT: CPT | Performed by: ORTHOPAEDIC SURGERY

## 2019-09-27 PROCEDURE — 6370000000 HC RX 637 (ALT 250 FOR IP)

## 2019-09-27 PROCEDURE — 7100000010 HC PHASE II RECOVERY - FIRST 15 MIN: Performed by: ORTHOPAEDIC SURGERY

## 2019-09-27 PROCEDURE — 82962 GLUCOSE BLOOD TEST: CPT

## 2019-09-27 PROCEDURE — 3700000000 HC ANESTHESIA ATTENDED CARE: Performed by: ORTHOPAEDIC SURGERY

## 2019-09-27 PROCEDURE — 7100000001 HC PACU RECOVERY - ADDTL 15 MIN: Performed by: ORTHOPAEDIC SURGERY

## 2019-09-27 PROCEDURE — 2709999900 HC NON-CHARGEABLE SUPPLY: Performed by: ORTHOPAEDIC SURGERY

## 2019-09-27 PROCEDURE — C1713 ANCHOR/SCREW BN/BN,TIS/BN: HCPCS | Performed by: ORTHOPAEDIC SURGERY

## 2019-09-27 PROCEDURE — 2500000003 HC RX 250 WO HCPCS: Performed by: ORTHOPAEDIC SURGERY

## 2019-09-27 PROCEDURE — 25320 REPAIR/REVISE WRIST JOINT: CPT | Performed by: PHYSICIAN ASSISTANT

## 2019-09-27 PROCEDURE — 2720000010 HC SURG SUPPLY STERILE: Performed by: ORTHOPAEDIC SURGERY

## 2019-09-27 PROCEDURE — 7100000011 HC PHASE II RECOVERY - ADDTL 15 MIN: Performed by: ORTHOPAEDIC SURGERY

## 2019-09-27 DEVICE — ANCHOR SUT MINI SZ 2-0 BRAID COMP SUT W/ V5 L18IN DBL ARMED: Type: IMPLANTABLE DEVICE | Site: WRIST | Status: FUNCTIONAL

## 2019-09-27 RX ORDER — ONDANSETRON 2 MG/ML
4 INJECTION INTRAMUSCULAR; INTRAVENOUS
Status: DISCONTINUED | OUTPATIENT
Start: 2019-09-27 | End: 2019-09-27 | Stop reason: HOSPADM

## 2019-09-27 RX ORDER — ONDANSETRON 2 MG/ML
INJECTION INTRAMUSCULAR; INTRAVENOUS PRN
Status: DISCONTINUED | OUTPATIENT
Start: 2019-09-27 | End: 2019-09-27 | Stop reason: SDUPTHER

## 2019-09-27 RX ORDER — GLYCOPYRROLATE 1 MG/5 ML
SYRINGE (ML) INTRAVENOUS PRN
Status: DISCONTINUED | OUTPATIENT
Start: 2019-09-27 | End: 2019-09-27 | Stop reason: SDUPTHER

## 2019-09-27 RX ORDER — OXYCODONE HYDROCHLORIDE AND ACETAMINOPHEN 5; 325 MG/1; MG/1
1 TABLET ORAL ONCE
Status: COMPLETED | OUTPATIENT
Start: 2019-09-27 | End: 2019-09-27

## 2019-09-27 RX ORDER — FENTANYL CITRATE 50 UG/ML
INJECTION, SOLUTION INTRAMUSCULAR; INTRAVENOUS PRN
Status: DISCONTINUED | OUTPATIENT
Start: 2019-09-27 | End: 2019-09-27 | Stop reason: SDUPTHER

## 2019-09-27 RX ORDER — SODIUM CHLORIDE 9 MG/ML
INJECTION, SOLUTION INTRAVENOUS CONTINUOUS
Status: DISCONTINUED | OUTPATIENT
Start: 2019-09-27 | End: 2019-09-27 | Stop reason: HOSPADM

## 2019-09-27 RX ORDER — FENTANYL CITRATE 50 UG/ML
25 INJECTION, SOLUTION INTRAMUSCULAR; INTRAVENOUS EVERY 5 MIN PRN
Status: DISCONTINUED | OUTPATIENT
Start: 2019-09-27 | End: 2019-09-27 | Stop reason: HOSPADM

## 2019-09-27 RX ORDER — LIDOCAINE HYDROCHLORIDE 20 MG/ML
INJECTION, SOLUTION EPIDURAL; INFILTRATION; INTRACAUDAL; PERINEURAL PRN
Status: DISCONTINUED | OUTPATIENT
Start: 2019-09-27 | End: 2019-09-27 | Stop reason: SDUPTHER

## 2019-09-27 RX ORDER — SUCCINYLCHOLINE CHLORIDE 20 MG/ML
INJECTION INTRAMUSCULAR; INTRAVENOUS PRN
Status: DISCONTINUED | OUTPATIENT
Start: 2019-09-27 | End: 2019-09-27 | Stop reason: SDUPTHER

## 2019-09-27 RX ORDER — KETOROLAC TROMETHAMINE 30 MG/ML
INJECTION, SOLUTION INTRAMUSCULAR; INTRAVENOUS PRN
Status: DISCONTINUED | OUTPATIENT
Start: 2019-09-27 | End: 2019-09-27 | Stop reason: SDUPTHER

## 2019-09-27 RX ORDER — OXYCODONE HYDROCHLORIDE AND ACETAMINOPHEN 5; 325 MG/1; MG/1
TABLET ORAL
Status: COMPLETED
Start: 2019-09-27 | End: 2019-09-27

## 2019-09-27 RX ORDER — MEPERIDINE HYDROCHLORIDE 25 MG/ML
12.5 INJECTION INTRAMUSCULAR; INTRAVENOUS; SUBCUTANEOUS EVERY 5 MIN PRN
Status: DISCONTINUED | OUTPATIENT
Start: 2019-09-27 | End: 2019-09-27 | Stop reason: HOSPADM

## 2019-09-27 RX ORDER — SODIUM CHLORIDE 0.9 % (FLUSH) 0.9 %
10 SYRINGE (ML) INJECTION EVERY 12 HOURS SCHEDULED
Status: DISCONTINUED | OUTPATIENT
Start: 2019-09-27 | End: 2019-09-27 | Stop reason: HOSPADM

## 2019-09-27 RX ORDER — PROPOFOL 10 MG/ML
INJECTION, EMULSION INTRAVENOUS PRN
Status: DISCONTINUED | OUTPATIENT
Start: 2019-09-27 | End: 2019-09-27 | Stop reason: SDUPTHER

## 2019-09-27 RX ORDER — CEFAZOLIN SODIUM 2 G/50ML
2 SOLUTION INTRAVENOUS
Status: COMPLETED | OUTPATIENT
Start: 2019-09-27 | End: 2019-09-27

## 2019-09-27 RX ORDER — BUPIVACAINE HYDROCHLORIDE AND EPINEPHRINE 5; 5 MG/ML; UG/ML
INJECTION, SOLUTION EPIDURAL; INTRACAUDAL; PERINEURAL PRN
Status: DISCONTINUED | OUTPATIENT
Start: 2019-09-27 | End: 2019-09-27 | Stop reason: ALTCHOICE

## 2019-09-27 RX ORDER — OXYCODONE HYDROCHLORIDE AND ACETAMINOPHEN 5; 325 MG/1; MG/1
1 TABLET ORAL EVERY 6 HOURS PRN
Qty: 28 TABLET | Refills: 0 | Status: SHIPPED | OUTPATIENT
Start: 2019-09-27 | End: 2019-10-04 | Stop reason: SDUPTHER

## 2019-09-27 RX ORDER — FENTANYL CITRATE 50 UG/ML
50 INJECTION, SOLUTION INTRAMUSCULAR; INTRAVENOUS EVERY 5 MIN PRN
Status: DISCONTINUED | OUTPATIENT
Start: 2019-09-27 | End: 2019-09-27 | Stop reason: HOSPADM

## 2019-09-27 RX ORDER — NEOSTIGMINE METHYLSULFATE 1 MG/ML
INJECTION, SOLUTION INTRAVENOUS PRN
Status: DISCONTINUED | OUTPATIENT
Start: 2019-09-27 | End: 2019-09-27 | Stop reason: SDUPTHER

## 2019-09-27 RX ORDER — MIDAZOLAM HYDROCHLORIDE 1 MG/ML
INJECTION INTRAMUSCULAR; INTRAVENOUS PRN
Status: DISCONTINUED | OUTPATIENT
Start: 2019-09-27 | End: 2019-09-27 | Stop reason: SDUPTHER

## 2019-09-27 RX ORDER — ROCURONIUM BROMIDE 10 MG/ML
INJECTION, SOLUTION INTRAVENOUS PRN
Status: DISCONTINUED | OUTPATIENT
Start: 2019-09-27 | End: 2019-09-27 | Stop reason: SDUPTHER

## 2019-09-27 RX ORDER — SODIUM CHLORIDE 0.9 % (FLUSH) 0.9 %
10 SYRINGE (ML) INJECTION PRN
Status: DISCONTINUED | OUTPATIENT
Start: 2019-09-27 | End: 2019-09-27 | Stop reason: HOSPADM

## 2019-09-27 RX ADMIN — ROCURONIUM BROMIDE 20 MG: 10 SOLUTION INTRAVENOUS at 11:33

## 2019-09-27 RX ADMIN — HYDROMORPHONE HYDROCHLORIDE 0.5 MG: 1 INJECTION, SOLUTION INTRAMUSCULAR; INTRAVENOUS; SUBCUTANEOUS at 13:03

## 2019-09-27 RX ADMIN — HYDROMORPHONE HYDROCHLORIDE 0.5 MG: 1 INJECTION, SOLUTION INTRAMUSCULAR; INTRAVENOUS; SUBCUTANEOUS at 13:20

## 2019-09-27 RX ADMIN — PROPOFOL 200 MG: 10 INJECTION, EMULSION INTRAVENOUS at 11:25

## 2019-09-27 RX ADMIN — CEFAZOLIN SODIUM 2 G: 2 SOLUTION INTRAVENOUS at 11:19

## 2019-09-27 RX ADMIN — MIDAZOLAM HYDROCHLORIDE 2 MG: 1 INJECTION, SOLUTION INTRAMUSCULAR; INTRAVENOUS at 11:19

## 2019-09-27 RX ADMIN — ONDANSETRON HYDROCHLORIDE 4 MG: 2 INJECTION, SOLUTION INTRAMUSCULAR; INTRAVENOUS at 12:30

## 2019-09-27 RX ADMIN — Medication 3 MG: at 12:35

## 2019-09-27 RX ADMIN — LIDOCAINE HYDROCHLORIDE 60 MG: 20 INJECTION, SOLUTION EPIDURAL; INFILTRATION; INTRACAUDAL; PERINEURAL at 11:25

## 2019-09-27 RX ADMIN — SUCCINYLCHOLINE CHLORIDE 160 MG: 20 INJECTION, SOLUTION INTRAMUSCULAR; INTRAVENOUS at 11:25

## 2019-09-27 RX ADMIN — KETOROLAC TROMETHAMINE 30 MG: 30 INJECTION, SOLUTION INTRAMUSCULAR; INTRAVENOUS at 12:38

## 2019-09-27 RX ADMIN — Medication 0.4 MG: at 12:35

## 2019-09-27 RX ADMIN — HYDROMORPHONE HYDROCHLORIDE 0.5 MG: 1 INJECTION, SOLUTION INTRAMUSCULAR; INTRAVENOUS; SUBCUTANEOUS at 13:12

## 2019-09-27 RX ADMIN — SODIUM CHLORIDE: 9 INJECTION, SOLUTION INTRAVENOUS at 11:19

## 2019-09-27 RX ADMIN — SODIUM CHLORIDE: 9 INJECTION, SOLUTION INTRAVENOUS at 12:00

## 2019-09-27 RX ADMIN — FENTANYL CITRATE 50 MCG: 50 INJECTION, SOLUTION INTRAMUSCULAR; INTRAVENOUS at 11:25

## 2019-09-27 RX ADMIN — OXYCODONE HYDROCHLORIDE AND ACETAMINOPHEN 1 TABLET: 5; 325 TABLET ORAL at 14:07

## 2019-09-27 RX ADMIN — HYDROMORPHONE HYDROCHLORIDE 0.5 MG: 1 INJECTION, SOLUTION INTRAMUSCULAR; INTRAVENOUS; SUBCUTANEOUS at 13:25

## 2019-09-27 RX ADMIN — FENTANYL CITRATE 50 MCG: 50 INJECTION, SOLUTION INTRAMUSCULAR; INTRAVENOUS at 11:44

## 2019-09-27 ASSESSMENT — PULMONARY FUNCTION TESTS
PIF_VALUE: 31
PIF_VALUE: 15
PIF_VALUE: 27
PIF_VALUE: 30
PIF_VALUE: 31
PIF_VALUE: 30
PIF_VALUE: 30
PIF_VALUE: 19
PIF_VALUE: 31
PIF_VALUE: 30
PIF_VALUE: 31
PIF_VALUE: 30
PIF_VALUE: 31
PIF_VALUE: 27
PIF_VALUE: 30
PIF_VALUE: 2
PIF_VALUE: 30
PIF_VALUE: 30
PIF_VALUE: 31
PIF_VALUE: 38
PIF_VALUE: 28
PIF_VALUE: 1
PIF_VALUE: 2
PIF_VALUE: 28
PIF_VALUE: 31
PIF_VALUE: 25
PIF_VALUE: 30
PIF_VALUE: 3
PIF_VALUE: 30
PIF_VALUE: 30
PIF_VALUE: 15
PIF_VALUE: 28
PIF_VALUE: 30
PIF_VALUE: 28
PIF_VALUE: 2
PIF_VALUE: 31
PIF_VALUE: 29
PIF_VALUE: 31
PIF_VALUE: 27
PIF_VALUE: 20
PIF_VALUE: 31
PIF_VALUE: 15
PIF_VALUE: 30
PIF_VALUE: 31
PIF_VALUE: 30
PIF_VALUE: 31
PIF_VALUE: 1
PIF_VALUE: 31
PIF_VALUE: 31
PIF_VALUE: 30
PIF_VALUE: 30
PIF_VALUE: 31
PIF_VALUE: 30
PIF_VALUE: 31
PIF_VALUE: 15
PIF_VALUE: 29
PIF_VALUE: 31
PIF_VALUE: 28
PIF_VALUE: 31
PIF_VALUE: 31
PIF_VALUE: 30
PIF_VALUE: 28
PIF_VALUE: 2
PIF_VALUE: 31
PIF_VALUE: 27
PIF_VALUE: 31
PIF_VALUE: 31
PIF_VALUE: 27
PIF_VALUE: 30
PIF_VALUE: 31
PIF_VALUE: 31
PIF_VALUE: 30
PIF_VALUE: 31
PIF_VALUE: 15
PIF_VALUE: 30
PIF_VALUE: 31
PIF_VALUE: 12
PIF_VALUE: 31
PIF_VALUE: 2
PIF_VALUE: 3

## 2019-09-27 ASSESSMENT — PAIN SCALES - GENERAL
PAINLEVEL_OUTOF10: 7
PAINLEVEL_OUTOF10: 6
PAINLEVEL_OUTOF10: 8
PAINLEVEL_OUTOF10: 9
PAINLEVEL_OUTOF10: 7
PAINLEVEL_OUTOF10: 9

## 2019-09-27 ASSESSMENT — PAIN - FUNCTIONAL ASSESSMENT: PAIN_FUNCTIONAL_ASSESSMENT: 0-10

## 2019-09-27 ASSESSMENT — PAIN DESCRIPTION - DESCRIPTORS: DESCRIPTORS: DISCOMFORT;ACHING

## 2019-09-27 ASSESSMENT — PAIN DESCRIPTION - LOCATION: LOCATION: WRIST

## 2019-09-27 ASSESSMENT — PAIN DESCRIPTION - PAIN TYPE
TYPE: SURGICAL PAIN

## 2019-09-27 ASSESSMENT — PAIN DESCRIPTION - ORIENTATION: ORIENTATION: RIGHT

## 2019-09-27 NOTE — ANESTHESIA PRE PROCEDURE
12. 50     Types: Cigarettes    Smokeless tobacco: Never Used   Substance Use Topics    Alcohol use: No     Alcohol/week: 1.0 standard drinks     Types: 1 Shots of liquor per week     Comment: rarely                                Ready to quit: Not Answered  Counseling given: Not Answered      Vital Signs (Current):   Vitals:    09/20/19 1530 09/27/19 1017 09/27/19 1258   BP:  (!) 153/92 125/74   Pulse:  70 74   Resp:  20 16   Temp:  97.4 °F (36.3 °C) 96.4 °F (35.8 °C)   TempSrc:  Temporal    SpO2:  95% 95%   Weight: 214 lb (97.1 kg) 223 lb (101.2 kg)    Height: 5' 1\" (1.549 m) 5' 1\" (1.549 m)                                               BP Readings from Last 3 Encounters:   09/27/19 125/74   09/27/19 125/82   08/13/19 (!) 131/93       NPO Status: Time of last liquid consumption: 2345                        Time of last solid consumption: 2345                        Date of last liquid consumption: 09/26/19                        Date of last solid food consumption: 09/26/19    BMI:   Wt Readings from Last 3 Encounters:   09/27/19 223 lb (101.2 kg)   07/19/19 214 lb (97.1 kg)   01/09/19 222 lb (100.7 kg)     Body mass index is 42.14 kg/m². CBC:   Lab Results   Component Value Date    WBC 8.6 02/28/2019    RBC 4.43 02/28/2019    HGB 13.2 02/28/2019    HCT 40.9 02/28/2019    MCV 92.3 02/28/2019    RDW 13.5 02/28/2019     02/28/2019       CMP:   Lab Results   Component Value Date     02/28/2019    K 3.9 02/28/2019     02/28/2019    CO2 29 02/28/2019    BUN 11 02/28/2019    CREATININE 0.9 02/28/2019    GFRAA >60 02/28/2019    LABGLOM >60 02/28/2019    GLUCOSE 92 02/28/2019    GLUCOSE 122 01/13/2011    PROT 6.8 02/28/2019    CALCIUM 9.3 02/28/2019    BILITOT <0.2 02/28/2019    ALKPHOS 91 02/28/2019    AST 14 02/28/2019    ALT 15 02/28/2019       POC Tests: No results for input(s): POCGLU, POCNA, POCK, POCCL, POCBUN, POCHEMO, POCHCT in the last 72 hours.     Coags: No results found for: PROTIME,

## 2019-09-27 NOTE — H&P
Department of Orthopedic Surgery  History and Physical        CHIEF COMPLAINT: Right wrist pain     HISTORY OF PRESENT ILLNESS:                 The patient is a 34 y.o. female who presents with worsening right wrist pain. She is right-hand dominant . She reports insidious onset of numbness and tingling in the right hand 6 months ago. Recently about 2 months ago she developed increasing pain and swelling over the dorsal ulnar aspect of the wrist which has been recalcitrant to oral NSAIDs and steroids as well as a brace. She did have x-rays back in 2018 which were reviewed. X-rays were negative for any fractures or dislocations. The DRUJ is well preserved.     She returns today. She reports the injection she received at her last visit only lasted a week or 2. She still reporting persistent pain in the ulnar aspect of her wrist.  She is been trying to wear the brace but reports that it still fitting and causes her discomfort. She did have an EMG nerve conduction study completed demonstrating a sensory latency of 3.49. Motor latency across the wrist was 3.54. Interpretation was mild to moderate carpal tunnel syndrome on the right. No other peripheral nerve entrapment or radicular findings present.   At this time she is interested in proceeding with surgical intervention.     Past Medical History:    Past Medical History             Diagnosis Date    Anxiety      Depression      Herpes genitalia      Kidney stones      Lower back pain       ruptured discs, lumbar         Past Surgical History:    Past Surgical History             Procedure Laterality Date     SECTION        COLONOSCOPY        CYSTOSCOPY        DILATION AND CURETTAGE OF UTERUS        ENDOMETRIAL ABLATION        HYSTEROSCOPY   2017     D & C    LEEP        LITHOTRIPSY        OTHER SURGICAL HISTORY Bilateral 2017     Total Hysterectomy Bilateral Salpingectomy     TUBAL LIGATION

## 2019-09-27 NOTE — ANESTHESIA POSTPROCEDURE EVALUATION
Department of Anesthesiology  Postprocedure Note    Patient: Dillon Collins  MRN: [id number]  YOB: 1980  Date of evaluation: 9/27/2019  Time:  1:02 PM     Procedure Summary     Date:  09/27/19 Room / Location:  [facility] OR 04 / [facility] OR    Anesthesia Start:  1119 Anesthesia Stop:  1300    Procedures:       RIGHT WRIST ARTHROSCOPY, OPEN TFCC REPAIR WITH DISTAL ULNAR RADIAL JOINT, ECU TENDON STABILIZATION (ACCUMED TOWER,MITEK ANCHORS) (Right Wrist)      RIGHT CARPAL TUNNEL RELEASE ENDOSCOPIC (Right Wrist) Diagnosis:  (TFCC INJURY RIGHT WRIST, TENDONITIS RIGHT WRIST, RIGHT CARPAL TUNNEL SYNDROME)    Surgeon:  Chepe Concepcion MD Responsible Provider:  Ana Ventura MD    Anesthesia Type:  general ASA Status:  3          Anesthesia Type: No value filed. Shashank Phase I: Shashank Score: 9    Shashank Phase II:      Last vitals: Reviewed and per EMR flowsheets.        Anesthesia Post Evaluation

## 2019-09-28 NOTE — OP NOTE
[address] [address]                                OPERATIVE REPORT    PATIENT NAME: Kathleen Harkins                    :        1980  MED REC NO:   [id number]                            ROOM:  ACCOUNT NO:   [de-identified]                           ADMIT DATE: 2019  PROVIDER:     Lorene Rice MD    DATE OF PROCEDURE:  2019    PREOPERATIVE DIAGNOSES:  1. Right carpal tunnel syndrome. 2.  Right TFCC tear. 3.  Right ECU tendon instability. 4.  Right DRUJ instability. POSTOPERATIVE DIAGNOSES:  1. Right carpal tunnel syndrome. 2.  Right TFCC tear. 3.  Right ECU tendon instability. 4.  Right DRUJ instability. PROCEDURE PERFORMED:  1. Right endoscopic carpal tunnel release. 2.  Right wrist arthroscopy with debridement of synovitis. 3.  Right open TFCC repair using a Mitek anchor. 4.  Right DRUJ capsulodesis. 5.  Right extensor carpi ulnaris tendon stabilization. ANESTHESIA:  1. General.  2.  Local anesthetic by surgeon consisting of approximately 10 mL of  0.25% Marcaine with epinephrine. ASSISTANT:  Kacy Gonzales physician assistant certified. She was  present throughout the procedure. Her assistance was used for  preoperative positioning, intraoperative retraction, closure, and  dressing application. Her assistance expedited the case and decreased  the surgical time. An orthopedic surgery resident was unavailable for  case coverage at the time of surgery. TOTAL TOURNIQUET TIME:  Approximately 43 minutes at 250 mmHg of brachial  tourniquet. BLOOD LOSS:  Minimal.    FINDINGS:  1. Exam under anesthesia revealed gross instability of the DRUJ with a  clicking and snapping. 2.  Direct visualization of the ECU tendon revealed ECU tendon  instability with a subsheath tear. 3.  Wrist arthroscopy revealed a peripheral TFCC tear and ulnar-sided  synovitis.   4.  Wrist arthroscopy remnants of the FiberWire suture  completing a dorsal capsulodesis and stabilization. With this in place,  the forearm was pronated neutral and DRUJ remained stable without undue  tension on the capsular plication. The wound was copiously irrigated  out. Tourniquet was deflated. Hemostasis achieved with bipolar  cautery. The ECU tendon was then stabilized at the conclusion of the case with a  sling created at the beginning, which was used to dorsally stabilize the  ECU, which was sewn back on itself with multiple FiberWire sutures and  Vicryl suture. With this in place, the skin was closed with 2-0 Vicryl  and a 3-0 Monocryl, Mastisol, Steri-Strips, sterile dressing, and a  sugar-tong splint was applied.         Vlad Arriaga MD    D: 09/27/2019 18:54:48       T: 09/28/2019 3:13:26     [email or web address]  Job#: [id number]     Doc#: [id number]    CC:

## 2019-10-03 ENCOUNTER — TELEPHONE (OUTPATIENT)
Dept: ORTHOPEDIC SURGERY | Age: 39
End: 2019-10-03

## 2019-10-04 ENCOUNTER — TELEPHONE (OUTPATIENT)
Dept: ORTHOPEDIC SURGERY | Age: 39
End: 2019-10-04

## 2019-10-04 DIAGNOSIS — G89.18 POST-OPERATIVE PAIN: ICD-10-CM

## 2019-10-04 DIAGNOSIS — S69.81XA TFCC (TRIANGULAR FIBROCARTILAGE COMPLEX) INJURY, RIGHT, INITIAL ENCOUNTER: ICD-10-CM

## 2019-10-04 DIAGNOSIS — S69.81XA TFCC (TRIANGULAR FIBROCARTILAGE COMPLEX) INJURY, RIGHT, INITIAL ENCOUNTER: Primary | ICD-10-CM

## 2019-10-04 RX ORDER — SULFAMETHOXAZOLE AND TRIMETHOPRIM 800; 160 MG/1; MG/1
1 TABLET ORAL 2 TIMES DAILY
Qty: 20 TABLET | Refills: 0 | Status: SHIPPED | OUTPATIENT
Start: 2019-10-04 | End: 2019-10-04 | Stop reason: SDUPTHER

## 2019-10-04 RX ORDER — SULFAMETHOXAZOLE AND TRIMETHOPRIM 800; 160 MG/1; MG/1
1 TABLET ORAL 2 TIMES DAILY
Qty: 20 TABLET | Refills: 0 | Status: SHIPPED | OUTPATIENT
Start: 2019-10-04 | End: 2019-10-14

## 2019-10-07 ENCOUNTER — OFFICE VISIT (OUTPATIENT)
Dept: ORTHOPEDIC SURGERY | Age: 39
End: 2019-10-07

## 2019-10-07 VITALS — HEART RATE: 78 BPM | RESPIRATION RATE: 18 BRPM | DIASTOLIC BLOOD PRESSURE: 90 MMHG | SYSTOLIC BLOOD PRESSURE: 130 MMHG

## 2019-10-07 DIAGNOSIS — S69.81XA TFCC (TRIANGULAR FIBROCARTILAGE COMPLEX) INJURY, RIGHT, INITIAL ENCOUNTER: Primary | ICD-10-CM

## 2019-10-07 PROCEDURE — 99024 POSTOP FOLLOW-UP VISIT: CPT | Performed by: ORTHOPAEDIC SURGERY

## 2019-10-07 RX ORDER — OXYCODONE HYDROCHLORIDE AND ACETAMINOPHEN 5; 325 MG/1; MG/1
1 TABLET ORAL EVERY 4 HOURS PRN
Qty: 42 TABLET | Refills: 0 | Status: SHIPPED | OUTPATIENT
Start: 2019-10-07 | End: 2019-10-17 | Stop reason: SDUPTHER

## 2019-10-08 ENCOUNTER — TELEPHONE (OUTPATIENT)
Dept: ADMINISTRATIVE | Age: 39
End: 2019-10-08

## 2019-10-17 ENCOUNTER — OFFICE VISIT (OUTPATIENT)
Dept: ORTHOPEDIC SURGERY | Age: 39
End: 2019-10-17
Payer: MEDICAID

## 2019-10-17 VITALS — DIASTOLIC BLOOD PRESSURE: 90 MMHG | HEART RATE: 80 BPM | SYSTOLIC BLOOD PRESSURE: 130 MMHG | RESPIRATION RATE: 18 BRPM

## 2019-10-17 DIAGNOSIS — G89.18 POST-OPERATIVE PAIN: ICD-10-CM

## 2019-10-17 PROCEDURE — 29075 APPL CST ELBW FNGR SHORT ARM: CPT | Performed by: ORTHOPAEDIC SURGERY

## 2019-10-17 PROCEDURE — 99024 POSTOP FOLLOW-UP VISIT: CPT | Performed by: ORTHOPAEDIC SURGERY

## 2019-10-17 RX ORDER — OXYCODONE HYDROCHLORIDE AND ACETAMINOPHEN 5; 325 MG/1; MG/1
1 TABLET ORAL EVERY 6 HOURS PRN
Qty: 28 TABLET | Refills: 0 | Status: SHIPPED | OUTPATIENT
Start: 2019-10-17 | End: 2019-10-24

## 2019-11-14 ENCOUNTER — OFFICE VISIT (OUTPATIENT)
Dept: ORTHOPEDIC SURGERY | Age: 39
End: 2019-11-14

## 2019-11-14 VITALS — HEART RATE: 68 BPM | SYSTOLIC BLOOD PRESSURE: 146 MMHG | DIASTOLIC BLOOD PRESSURE: 87 MMHG | RESPIRATION RATE: 18 BRPM

## 2019-11-14 DIAGNOSIS — S69.81XA TFCC (TRIANGULAR FIBROCARTILAGE COMPLEX) INJURY, RIGHT, INITIAL ENCOUNTER: Primary | ICD-10-CM

## 2019-11-14 PROCEDURE — 99024 POSTOP FOLLOW-UP VISIT: CPT | Performed by: ORTHOPAEDIC SURGERY

## 2019-11-14 RX ORDER — HYDROCODONE BITARTRATE AND ACETAMINOPHEN 5; 325 MG/1; MG/1
1 TABLET ORAL EVERY 6 HOURS PRN
Qty: 28 TABLET | Refills: 0 | Status: SHIPPED | OUTPATIENT
Start: 2019-11-14 | End: 2019-12-03 | Stop reason: SDUPTHER

## 2019-11-14 RX ORDER — IBUPROFEN 600 MG/1
600 TABLET ORAL 3 TIMES DAILY PRN
Qty: 90 TABLET | Refills: 0 | Status: SHIPPED
Start: 2019-11-14 | End: 2021-04-13 | Stop reason: ALTCHOICE

## 2019-11-18 ENCOUNTER — HOSPITAL ENCOUNTER (EMERGENCY)
Age: 39
Discharge: HOME | End: 2019-11-18
Attending: FAMILY MEDICINE
Payer: MEDICAID

## 2019-11-18 VITALS
WEIGHT: 225 LBS | OXYGEN SATURATION: 95 % | DIASTOLIC BLOOD PRESSURE: 85 MMHG | HEIGHT: 62 IN | RESPIRATION RATE: 20 BRPM | HEART RATE: 75 BPM | TEMPERATURE: 98.4 F | BODY MASS INDEX: 41.41 KG/M2 | SYSTOLIC BLOOD PRESSURE: 134 MMHG

## 2019-11-18 DIAGNOSIS — J20.9 ACUTE BRONCHITIS, UNSPECIFIED ORGANISM: Primary | ICD-10-CM

## 2019-11-18 LAB — STREP GRP A PCR: NEGATIVE

## 2019-11-18 PROCEDURE — 87880 STREP A ASSAY W/OPTIC: CPT

## 2019-11-18 PROCEDURE — G0382 LEV 3 HOSP TYPE B ED VISIT: HCPCS

## 2019-11-18 RX ORDER — BENZONATATE 100 MG/1
100 CAPSULE ORAL 3 TIMES DAILY PRN
Qty: 20 CAPSULE | Refills: 0 | Status: SHIPPED | OUTPATIENT
Start: 2019-11-18 | End: 2019-11-25

## 2019-11-18 RX ORDER — ALBUTEROL SULFATE 90 UG/1
2 AEROSOL, METERED RESPIRATORY (INHALATION) EVERY 4 HOURS PRN
Qty: 1 INHALER | Refills: 0 | Status: SHIPPED | OUTPATIENT
Start: 2019-11-18 | End: 2020-05-21 | Stop reason: ALTCHOICE

## 2019-11-18 ASSESSMENT — PAIN DESCRIPTION - PAIN TYPE: TYPE: ACUTE PAIN

## 2019-11-18 ASSESSMENT — PAIN DESCRIPTION - PROGRESSION
CLINICAL_PROGRESSION: NOT CHANGED
CLINICAL_PROGRESSION: NOT CHANGED

## 2019-11-18 ASSESSMENT — PAIN DESCRIPTION - LOCATION: LOCATION: THROAT

## 2019-11-18 ASSESSMENT — PAIN SCALES - GENERAL: PAINLEVEL_OUTOF10: 7

## 2019-11-18 ASSESSMENT — PAIN DESCRIPTION - FREQUENCY: FREQUENCY: CONTINUOUS

## 2019-11-18 ASSESSMENT — PAIN DESCRIPTION - DESCRIPTORS: DESCRIPTORS: SORE

## 2019-11-26 ENCOUNTER — EVALUATION (OUTPATIENT)
Dept: OCCUPATIONAL THERAPY | Age: 39
End: 2019-11-26
Payer: MEDICAID

## 2019-11-26 DIAGNOSIS — S69.81XA TFCC (TRIANGULAR FIBROCARTILAGE COMPLEX) INJURY, RIGHT, INITIAL ENCOUNTER: Primary | ICD-10-CM

## 2019-11-26 PROCEDURE — 97165 OT EVAL LOW COMPLEX 30 MIN: CPT | Performed by: OCCUPATIONAL THERAPIST

## 2019-11-27 PROBLEM — S69.81XA TFCC (TRIANGULAR FIBROCARTILAGE COMPLEX) INJURY, RIGHT, INITIAL ENCOUNTER: Status: ACTIVE | Noted: 2019-11-27

## 2019-12-03 DIAGNOSIS — S69.81XA TFCC (TRIANGULAR FIBROCARTILAGE COMPLEX) INJURY, RIGHT, INITIAL ENCOUNTER: ICD-10-CM

## 2019-12-04 RX ORDER — HYDROCODONE BITARTRATE AND ACETAMINOPHEN 5; 325 MG/1; MG/1
1 TABLET ORAL EVERY 6 HOURS PRN
Qty: 28 TABLET | Refills: 0 | Status: SHIPPED | OUTPATIENT
Start: 2019-12-04 | End: 2019-12-12 | Stop reason: SDUPTHER

## 2019-12-10 ENCOUNTER — TELEPHONE (OUTPATIENT)
Dept: OCCUPATIONAL THERAPY | Age: 39
End: 2019-12-10

## 2019-12-11 ENCOUNTER — TELEPHONE (OUTPATIENT)
Dept: OCCUPATIONAL THERAPY | Age: 39
End: 2019-12-11

## 2019-12-12 ENCOUNTER — OFFICE VISIT (OUTPATIENT)
Dept: ORTHOPEDIC SURGERY | Age: 39
End: 2019-12-12

## 2019-12-12 VITALS — RESPIRATION RATE: 20 BRPM | HEART RATE: 85 BPM | DIASTOLIC BLOOD PRESSURE: 110 MMHG | SYSTOLIC BLOOD PRESSURE: 149 MMHG

## 2019-12-12 DIAGNOSIS — S69.81XA TFCC (TRIANGULAR FIBROCARTILAGE COMPLEX) INJURY, RIGHT, INITIAL ENCOUNTER: ICD-10-CM

## 2019-12-12 PROCEDURE — 99024 POSTOP FOLLOW-UP VISIT: CPT | Performed by: ORTHOPAEDIC SURGERY

## 2019-12-12 RX ORDER — HYDROCODONE BITARTRATE AND ACETAMINOPHEN 5; 325 MG/1; MG/1
1 TABLET ORAL EVERY 6 HOURS PRN
Qty: 28 TABLET | Refills: 0 | Status: SHIPPED | OUTPATIENT
Start: 2019-12-12 | End: 2019-12-19

## 2019-12-20 ENCOUNTER — TREATMENT (OUTPATIENT)
Dept: OCCUPATIONAL THERAPY | Age: 39
End: 2019-12-20
Payer: MEDICAID

## 2019-12-20 DIAGNOSIS — S69.81XA TFCC (TRIANGULAR FIBROCARTILAGE COMPLEX) INJURY, RIGHT, INITIAL ENCOUNTER: Primary | ICD-10-CM

## 2019-12-20 PROCEDURE — 97110 THERAPEUTIC EXERCISES: CPT | Performed by: OCCUPATIONAL THERAPIST

## 2019-12-20 PROCEDURE — 97140 MANUAL THERAPY 1/> REGIONS: CPT | Performed by: OCCUPATIONAL THERAPIST

## 2019-12-20 PROCEDURE — 97022 WHIRLPOOL THERAPY: CPT | Performed by: OCCUPATIONAL THERAPIST

## 2019-12-20 PROCEDURE — 97530 THERAPEUTIC ACTIVITIES: CPT | Performed by: OCCUPATIONAL THERAPIST

## 2019-12-23 ENCOUNTER — TREATMENT (OUTPATIENT)
Dept: OCCUPATIONAL THERAPY | Age: 39
End: 2019-12-23
Payer: MEDICAID

## 2019-12-23 DIAGNOSIS — S69.81XA TFCC (TRIANGULAR FIBROCARTILAGE COMPLEX) INJURY, RIGHT, INITIAL ENCOUNTER: Primary | ICD-10-CM

## 2019-12-23 PROCEDURE — 97110 THERAPEUTIC EXERCISES: CPT | Performed by: OCCUPATIONAL THERAPIST

## 2019-12-23 PROCEDURE — 97022 WHIRLPOOL THERAPY: CPT | Performed by: OCCUPATIONAL THERAPIST

## 2019-12-23 PROCEDURE — 97530 THERAPEUTIC ACTIVITIES: CPT | Performed by: OCCUPATIONAL THERAPIST

## 2019-12-23 PROCEDURE — 97140 MANUAL THERAPY 1/> REGIONS: CPT | Performed by: OCCUPATIONAL THERAPIST

## 2019-12-26 ENCOUNTER — TREATMENT (OUTPATIENT)
Dept: OCCUPATIONAL THERAPY | Age: 39
End: 2019-12-26
Payer: MEDICAID

## 2019-12-26 DIAGNOSIS — G89.18 POST-OPERATIVE PAIN: Primary | ICD-10-CM

## 2019-12-26 DIAGNOSIS — S69.81XA TFCC (TRIANGULAR FIBROCARTILAGE COMPLEX) INJURY, RIGHT, INITIAL ENCOUNTER: Primary | ICD-10-CM

## 2019-12-26 PROCEDURE — 97530 THERAPEUTIC ACTIVITIES: CPT | Performed by: OCCUPATIONAL THERAPIST

## 2019-12-26 PROCEDURE — 97140 MANUAL THERAPY 1/> REGIONS: CPT | Performed by: OCCUPATIONAL THERAPIST

## 2019-12-26 PROCEDURE — 97022 WHIRLPOOL THERAPY: CPT | Performed by: OCCUPATIONAL THERAPIST

## 2019-12-26 PROCEDURE — 97110 THERAPEUTIC EXERCISES: CPT | Performed by: OCCUPATIONAL THERAPIST

## 2019-12-27 RX ORDER — ACETAMINOPHEN AND CODEINE PHOSPHATE 300; 30 MG/1; MG/1
1 TABLET ORAL EVERY 4 HOURS PRN
Qty: 42 TABLET | Refills: 0 | Status: SHIPPED | OUTPATIENT
Start: 2019-12-27 | End: 2020-01-03

## 2020-01-08 ENCOUNTER — TREATMENT (OUTPATIENT)
Dept: OCCUPATIONAL THERAPY | Age: 40
End: 2020-01-08
Payer: MEDICAID

## 2020-01-08 PROCEDURE — 97530 THERAPEUTIC ACTIVITIES: CPT | Performed by: OCCUPATIONAL THERAPIST

## 2020-01-08 PROCEDURE — 97110 THERAPEUTIC EXERCISES: CPT | Performed by: OCCUPATIONAL THERAPIST

## 2020-01-08 PROCEDURE — 97022 WHIRLPOOL THERAPY: CPT | Performed by: OCCUPATIONAL THERAPIST

## 2020-01-08 PROCEDURE — 97140 MANUAL THERAPY 1/> REGIONS: CPT | Performed by: OCCUPATIONAL THERAPIST

## 2020-01-10 ENCOUNTER — TREATMENT (OUTPATIENT)
Dept: OCCUPATIONAL THERAPY | Age: 40
End: 2020-01-10
Payer: MEDICAID

## 2020-01-10 PROCEDURE — 97022 WHIRLPOOL THERAPY: CPT | Performed by: OCCUPATIONAL THERAPIST

## 2020-01-10 PROCEDURE — 97110 THERAPEUTIC EXERCISES: CPT | Performed by: OCCUPATIONAL THERAPIST

## 2020-01-10 PROCEDURE — 97140 MANUAL THERAPY 1/> REGIONS: CPT | Performed by: OCCUPATIONAL THERAPIST

## 2020-01-10 PROCEDURE — 97530 THERAPEUTIC ACTIVITIES: CPT | Performed by: OCCUPATIONAL THERAPIST

## 2020-01-14 ENCOUNTER — TREATMENT (OUTPATIENT)
Dept: OCCUPATIONAL THERAPY | Age: 40
End: 2020-01-14
Payer: MEDICAID

## 2020-01-14 PROCEDURE — 97530 THERAPEUTIC ACTIVITIES: CPT | Performed by: OCCUPATIONAL THERAPIST

## 2020-01-14 PROCEDURE — 97110 THERAPEUTIC EXERCISES: CPT | Performed by: OCCUPATIONAL THERAPIST

## 2020-01-14 PROCEDURE — 97022 WHIRLPOOL THERAPY: CPT | Performed by: OCCUPATIONAL THERAPIST

## 2020-01-14 PROCEDURE — 97140 MANUAL THERAPY 1/> REGIONS: CPT | Performed by: OCCUPATIONAL THERAPIST

## 2020-01-14 NOTE — PROGRESS NOTES
OCCUPATIONAL THERAPY PROGRESS NOTE    Date:  2020  Initial Evaluation Date: 19    Patient Name:  Robinson Costa    :  1980    Restrictions/Precautions: Activity as tolerated, Low fall risk  Diagnosis:  Right wrist TFCC injury                                                    Insurance/Certification information:  [address]  Referring Physician:  Dr Donte Carpenter  Date of Surgery/Injury: surgery 19  Plan of care signed (Y/N):  Y  Visit# / total visits:  (approval thru 19/ extended date requested last year/ insurance to address- states it was an error)    Pain Level: 5/10, aching, tight, in the right wrist    Subjective: \" I tweaked my hand reaching for my phone this morning when the alarm went off\"   Objective:  Updated POC to be completed by visit 12. INTERVENTION: COMPLETED: SPECIFICS/COMMENTS:   Modality:     Fluidotherapy x 10 min as preconditioning prior to exercise   US x wrist x  5 min post activity, 3.3MHz, 50% . 6   AROM:     Wrist AROM/ AAROM 10x All planes   Digit AROM  Tendon glides   Exercise balls x    wristciser 7x Hard with RD today   Beige may bar 20 reps Pronation/supination        PROM/Stretching:     gentle PROM wrist x         Manual techniques:     Soft tissue mob x         Strengthening:     Putty ex- xsoft x Gentle gripping 2 min, rolling with wrist/ digit ext x 2 min        Other:                 Assessment/Comments: Pt is making Good progress toward stated plan of care. Wrist soreness decreased by end of session after \"tweaking it\" this morning. Motivation for recovery is good. Will increase tx as tolerated.      -Rehab Potential: Good  -Requires OT Follow Up: Yes  Time In: 905        Time Out:           Visit # 6    Treatment Charges: Min Units  used Units approved Units  remaining   Fluidotherapy 10 1 12 6   Ther Exercise 15 1 12 6   Manual Therapy 10 1 12 6   Thera Activities 15 1 12 6   US 5 0 12 12   Other       Total Time/Units 55 4 Goals: Goals for pt can be see on initial eval occurring on 11-26-19    Plan:   [x]  Continue Plan of care: Treatment covered based on POC and graduated to patient's progress. Pt education continues at each visit to obtain maximum benefits from skilled OT intervention.   []  [address] of care:   []  Discharge:      Eddie PEPE/JOHN [id number]

## 2020-01-16 ENCOUNTER — TREATMENT (OUTPATIENT)
Dept: OCCUPATIONAL THERAPY | Age: 40
End: 2020-01-16
Payer: MEDICAID

## 2020-01-16 PROCEDURE — 97022 WHIRLPOOL THERAPY: CPT | Performed by: OCCUPATIONAL THERAPIST

## 2020-01-16 PROCEDURE — 97530 THERAPEUTIC ACTIVITIES: CPT | Performed by: OCCUPATIONAL THERAPIST

## 2020-01-16 PROCEDURE — 97140 MANUAL THERAPY 1/> REGIONS: CPT | Performed by: OCCUPATIONAL THERAPIST

## 2020-01-16 PROCEDURE — 97110 THERAPEUTIC EXERCISES: CPT | Performed by: OCCUPATIONAL THERAPIST

## 2020-01-21 ENCOUNTER — TREATMENT (OUTPATIENT)
Dept: OCCUPATIONAL THERAPY | Age: 40
End: 2020-01-21
Payer: MEDICAID

## 2020-01-21 PROCEDURE — 97110 THERAPEUTIC EXERCISES: CPT | Performed by: OCCUPATIONAL THERAPIST

## 2020-01-21 PROCEDURE — 97530 THERAPEUTIC ACTIVITIES: CPT | Performed by: OCCUPATIONAL THERAPIST

## 2020-01-21 PROCEDURE — 97140 MANUAL THERAPY 1/> REGIONS: CPT | Performed by: OCCUPATIONAL THERAPIST

## 2020-01-21 PROCEDURE — 97022 WHIRLPOOL THERAPY: CPT | Performed by: OCCUPATIONAL THERAPIST

## 2020-01-21 NOTE — PROGRESS NOTES
OCCUPATIONAL THERAPY PROGRESS NOTE    Date:  2020  Initial Evaluation Date: 19    Patient Name:  Marie Ya    :  1980    Restrictions/Precautions: Activity as tolerated, Low fall risk  Diagnosis:  Right wrist TFCC injury                                                    Insurance/Certification information:  [address]  Referring Physician:  Dr Ankit Meza  Date of Surgery/Injury: surgery 19  Plan of care signed (Y/N):  Y  Visit# / total visits:  (approval thru 20/ auth # [id number])    Pain Level: 4-5/10, stiff and sore,  tight, in the right wrist    Subjective: \" I am really stiff. I think t is the weather. \"   Objective:  Updated POC to be completed by visit 12. INTERVENTION: COMPLETED: SPECIFICS/COMMENTS:   Modality:     Fluidotherapy x 10 min as preconditioning prior to exercise   US x wrist x  5 min post activity, 3.3MHz, 50% . 6   AROM:     Wrist AROM/ AAROM 10x All planes   Digit AROM  Tendon glides   Exercise balls x    wristciser 7x              PROM/Stretching:     gentle PROM wrist x         Manual techniques:     Soft tissue mob x         Strengthening:     [place] bar- beige 2-10 Wrist twist in/ out   Digiflex/ red x Composite 10x- individual 5x   Wt'd dowel 15x 1# wt- forearm rotation/ deviations- sore with UD   Putty ex- xsoft x Gentle gripping 2 min, rolling with wrist/ digit ext x 2 min   PRE's R wrist x20 1# wt flexion/ext, RD/UD- pulling in the ulnar hand   Other:                 Assessment/Comments: Pt is making Good progress toward stated plan of care. Motivation for recovery is good. Pt states she is doing much better with her daily activities with the exception of lifting. Will increase tx as tolerated.      -Rehab Potential: Good  -Requires OT Follow Up: Yes  Time In: 905        Time Out:           Visit # 7    Treatment Charges: Min Units  used Units approved Units  remaining   Fluidotherapy 10 1 12 4   Ther Exercise 15 1 12 4   Manual Therapy 10 1 12 4   Thera Activities 20 1 12 4   US 5 0 12 12   Other       Total Time/Units 60 4         Goals: Goals for pt can be see on initial eval occurring on 11-26-19    Plan:   [x]  Continue Plan of care: Treatment covered based on POC and graduated to patient's progress. Pt education continues at each visit to obtain maximum benefits from skilled OT intervention.   []  [address] of care:   []  Discharge:      Georgiana Yancey OT/L  [id number]

## 2020-01-23 ENCOUNTER — TREATMENT (OUTPATIENT)
Dept: OCCUPATIONAL THERAPY | Age: 40
End: 2020-01-23
Payer: MEDICAID

## 2020-01-23 PROCEDURE — 97530 THERAPEUTIC ACTIVITIES: CPT | Performed by: OCCUPATIONAL THERAPY ASSISTANT

## 2020-01-23 PROCEDURE — 97110 THERAPEUTIC EXERCISES: CPT | Performed by: OCCUPATIONAL THERAPY ASSISTANT

## 2020-01-23 PROCEDURE — 97140 MANUAL THERAPY 1/> REGIONS: CPT | Performed by: OCCUPATIONAL THERAPY ASSISTANT

## 2020-01-23 PROCEDURE — 97022 WHIRLPOOL THERAPY: CPT | Performed by: OCCUPATIONAL THERAPY ASSISTANT

## 2020-01-23 NOTE — PROGRESS NOTES
OCCUPATIONAL THERAPY PROGRESS NOTE    Date:  2020  Initial Evaluation Date: 19    Patient Name:  Last Mullins    :  1980    Restrictions/Precautions: Activity as tolerated, Low fall risk  Diagnosis:  Right wrist TFCC injury                                                    Insurance/Certification information:  [address]  Referring Physician:  Dr Lorenzo Ferguson  Date of Surgery/Injury: surgery 19  Plan of care signed (Y/N):  Y  Visit# / total visits:  (approval thru 20/ auth # [id number])    Pain Level: 4-5/10, stiff and sore,  tight, in the right wrist    Subjective:  Pt. Stated \"I was in the ER last night for my kidney stones last night and didn't have a good experience\"   Objective:  Updated POC to be completed by visit 12. INTERVENTION: COMPLETED: SPECIFICS/COMMENTS:   Modality:     Fluidotherapy x 10 min as preconditioning prior to exercise   US x wrist x  5 min post activity, 3.3MHz, 50% . 6   AROM:     Wrist AROM/ AAROM 10x All planes   Digit AROM  Tendon glides   Exercise balls x    wristciser 7x              PROM/Stretching:     gentle PROM wrist x         Manual techniques:     Soft tissue mob x         Strengthening:     Ananda bar- beige 2-10 Wrist twist in/ out   Digiflex/ red x Composite 10x- individual 5x   Wt'd dowel 15x 1# wt- forearm rotation/ deviations- sore with UD   Putty ex- xsoft x Gentle gripping 2 min, rolling with wrist/ digit ext x 2 min   PRE's R wrist x20 2# wt flexion/ext, 1# RD/UD- pulling in the ulnar hand   Other:                 Assessment/Comments: Pt is making Good progress toward stated plan of care. Motivation for recovery is good. Pt states she is doing much better with her daily activities with the exception of lifting. Will increase tx as tolerated.      -Rehab Potential: Good  -Requires OT Follow Up: Yes  Time In: 1300        Time Out: 1400         Visit #9    Treatment Charges: Min Units  used Units approved Units  remaining

## 2020-01-28 ENCOUNTER — HOSPITAL ENCOUNTER (OUTPATIENT)
Age: 40
Discharge: HOME | End: 2020-01-30
Payer: MEDICAID

## 2020-01-28 PROCEDURE — 82365 CALCULUS SPECTROSCOPY: CPT

## 2020-01-28 PROCEDURE — 88300 SURGICAL PATH GROSS: CPT

## 2020-01-31 ENCOUNTER — TREATMENT (OUTPATIENT)
Dept: OCCUPATIONAL THERAPY | Age: 40
End: 2020-01-31
Payer: MEDICAID

## 2020-01-31 PROCEDURE — 97140 MANUAL THERAPY 1/> REGIONS: CPT | Performed by: OCCUPATIONAL THERAPIST

## 2020-01-31 PROCEDURE — 97110 THERAPEUTIC EXERCISES: CPT | Performed by: OCCUPATIONAL THERAPIST

## 2020-01-31 PROCEDURE — 97022 WHIRLPOOL THERAPY: CPT | Performed by: OCCUPATIONAL THERAPIST

## 2020-01-31 PROCEDURE — 97530 THERAPEUTIC ACTIVITIES: CPT | Performed by: OCCUPATIONAL THERAPIST

## 2020-02-02 LAB
CALCULI COMPOSITION: NORMAL
MASS: 14 MG
STONE DESCRIPTION: NORMAL
STONE NUMBER: 1
STONE SIZE: NORMAL MM

## 2020-02-04 ENCOUNTER — OFFICE VISIT (OUTPATIENT)
Dept: ORTHOPEDIC SURGERY | Age: 40
End: 2020-02-04
Payer: MEDICAID

## 2020-02-04 VITALS
HEIGHT: 62 IN | BODY MASS INDEX: 41.42 KG/M2 | TEMPERATURE: 97 F | DIASTOLIC BLOOD PRESSURE: 82 MMHG | WEIGHT: 225.09 LBS | SYSTOLIC BLOOD PRESSURE: 116 MMHG | HEART RATE: 87 BPM | RESPIRATION RATE: 18 BRPM

## 2020-02-04 PROCEDURE — G8427 DOCREV CUR MEDS BY ELIG CLIN: HCPCS | Performed by: ORTHOPAEDIC SURGERY

## 2020-02-04 PROCEDURE — G8484 FLU IMMUNIZE NO ADMIN: HCPCS | Performed by: ORTHOPAEDIC SURGERY

## 2020-02-04 PROCEDURE — 99212 OFFICE O/P EST SF 10 MIN: CPT | Performed by: ORTHOPAEDIC SURGERY

## 2020-02-04 PROCEDURE — G8417 CALC BMI ABV UP PARAM F/U: HCPCS | Performed by: ORTHOPAEDIC SURGERY

## 2020-02-04 PROCEDURE — 4004F PT TOBACCO SCREEN RCVD TLK: CPT | Performed by: ORTHOPAEDIC SURGERY

## 2020-02-04 RX ORDER — FAMOTIDINE 10 MG
20 TABLET ORAL 2 TIMES DAILY PRN
COMMUNITY
Start: 2020-01-23

## 2020-02-04 NOTE — PROGRESS NOTES
Chief Complaint   Patient presents with    Follow-up     Right DRUJ capsulodesis, TFCC repair 9/27/19. Currently in therapy         Riky Brewer is a 37y.o. year old  who presents for follow up 5 months out from her surgery. She reports she is doing well until she lifted a heavy box and had increasing pain on the ulnar aspect of her right wrist.  She reports she did feel a pop. She has pain at the terminal ends of pronation supination. She relates that she is still in therapy. She also still reports pillar pain. 1.  Right endoscopic carpal tunnel release. 2.  Right wrist arthroscopy with debridement of synovitis. 3.  Right open TFCC repair using a Mitek anchor. 4.  Right DRUJ capsulodesis. 5.  Right extensor carpi ulnaris tendon stabilization.       Past Medical History:   Diagnosis Date    Anxiety     Depression     Herpes genitalia     Kidney stones     Lower back pain     ruptured discs, lumbar     Past Surgical History:   Procedure Laterality Date    CARPAL TUNNEL RELEASE Right 9/27/2019    RIGHT CARPAL TUNNEL RELEASE ENDOSCOPIC performed by Yael Cuenca MD at [address] [place] [address] COLONOSCOPY      CYSTOSCOPY      DILATION AND CURETTAGE OF UTERUS      ENDOMETRIAL ABLATION      HYSTEROSCOPY  01/18/2017    D & C    LEEP      LITHOTRIPSY      OTHER SURGICAL HISTORY Bilateral 03/01/2017    Total Hysterectomy Bilateral Salpingectomy     TUBAL LIGATION      WRIST ARTHROSCOPY Right 9/27/2019    RIGHT WRIST ARTHROSCOPY, OPEN TFCC REPAIR WITH DISTAL ULNAR RADIAL JOINT, ECU TENDON STABILIZATION ([address] ANCHORS) performed by Yael Cuenca MD at [facility] OR       Current Outpatient Medications:     famotidine (PEPCID) 10 MG tablet, Take by mouth, Disp: , Rfl:     ibuprofen (ADVIL;MOTRIN) 600 MG tablet, Take 1 tablet by mouth 3 times daily as needed for Pain, Disp: 90 tablet, Rfl: 0    buPROPion (WELLBUTRIN XL) 150 MG extended release tablet, Take 1 tablet by mouth,

## 2020-02-06 ENCOUNTER — TREATMENT (OUTPATIENT)
Dept: OCCUPATIONAL THERAPY | Age: 40
End: 2020-02-06
Payer: MEDICAID

## 2020-02-06 PROCEDURE — 97140 MANUAL THERAPY 1/> REGIONS: CPT | Performed by: OCCUPATIONAL THERAPY ASSISTANT

## 2020-02-06 PROCEDURE — 97530 THERAPEUTIC ACTIVITIES: CPT | Performed by: OCCUPATIONAL THERAPY ASSISTANT

## 2020-02-06 PROCEDURE — 97022 WHIRLPOOL THERAPY: CPT | Performed by: OCCUPATIONAL THERAPY ASSISTANT

## 2020-02-06 NOTE — PROGRESS NOTES
OCCUPATIONAL THERAPY   PROGRESS NOTE/ STATUS UPDATE    Date:  2020  Initial Evaluation Date: 19    Patient Name:  Miguelina Ledesma    :  1980    Restrictions/Precautions: Activity as tolerated, Low fall risk  Diagnosis:  Right wrist TFCC injury                                                    Insurance/Certification information:  [address]  Referring Physician:  Dr Tami Shah  Date of Surgery/Injury: surgery 19  Plan of care signed (Y/N):  Y  Visit# / total visits: 10/ 12 (approval thru  auth # [id number])    Pain Level: 7/10, stiff and sore,  tight, in the right wrist    Subjective:  \"Since Tuesday I have had more pain then usual and it still hurts a lot today. I didn't do anything to hurt it that I can think of\"     Objective:  Updated POC to be completed by visit 12. INTERVENTION: COMPLETED: SPECIFICS/COMMENTS:   Modality:     Fluidotherapy x 20 min today as preconditioning prior to exercise due to increased pain. US x wrist x  5 min post activity, 3.3MHz, 50% . 6   AROM:     Wrist AROM/ AAROM 10x All planes   Digit AROM  Tendon glides   Exercise balls x    wristciser 7x              PROM/Stretching:     gentle PROM wrist x         Manual techniques:     Soft tissue mob x         Strengthening:     Ananda bar- beige 2-10 Wrist twist in/ out   Digiflex/ red  Composite 10x- individual 5x   Wt'd dowel  1# wt- forearm rotation/ deviations- sore with UD   Putty ex- xsoft  Gentle gripping 2 min, rolling with wrist/ digit ext x 2 min   PRE's R wrist  2# wt flexion/ext, 1# RD/UD- pulling in the ulnar hand   Other:                 Assessment/Comments: Pt is making Good progress toward stated plan of care up to today. She has shown a significant change in status including increased pain. Many components of testing is difficult due to pain. Motivation for recovery is good. Held all resistive exercises today due to sudden onset of pain since Tuesday.  Pt. told to rest and limit resistive ex's at home to see if pain subsides. AROM Measurements:  -R wrist extension- 0-40 degrees with pain. -R wrist flexion- 0-50 degrees with pain.   -R supination- WNL  -R pronation- WNL  -R Ulnar deviation- 0-34 degrees  -R radial deviation- 0-20 degrees with pain. GOALS (Long term same as Short term):  1) Patient will demonstrate good understanding of home program(exercises/activities/diagnosis/prognosis/goals) with good accuracy. (goal met for ROM, edema control, pain mgmt and strengthening)    2) Patient will demonstrate increased active/passive range of motion of their right wrist/ forearm to [facility] with 4/5 strength for improved  ADL/IADL completion. (goal not met)  AROM has decreased due to recent exacerbation. Strength tolerance in the wrist is decreased to 3+/5 today which is a decrease from the last few sessions. 3) Patient will demonstrate increased /pinch strength of at least 10 / 5 pinch pounds of their right hand.  (NT due to pain)    4) Patient to report decreased pain in their affected right distal upper extremity from 4/10 with light use to 2/10 or less with resistive functional use. (pain is increased to 7/10 today- goal not met)    5) Patient to demonstrate decreased guarding of their affected extremity from 50% to 20% or less. (guarding has increased since her exacerbation and is back to 50% average- goal not met)    6) Patient will report ADL/ IADLs/ Work functions same as prior to diagnosis of right wrist TFCC injury. (goal not met)  Tasks about the home is more difficult now than last week.        OT G-codes: quick Dash  Admission: 89%  Current: 32%    -Rehab Potential: Good  -Requires OT Follow Up: Yes  Time In: 1100        Time Out: 1200         Visit #10    Treatment Charges: Min Units  used Units approved Units  remaining   Fluidotherapy 10 1 12 1   Ther Exercise  0 12 2   Manual Therapy 15 1 12 1   Thera Activities 30 1 12 0    5 0 12 12   Other       Total Time/Units 60 4         Summary: Pt had been making good progress in OT care until recently. Her tolerance for movement, and  hand/ wrist use is decreased due to a recent onset of pain. Pt states it feels more like radiating nerve pains. Due to this change in status, continued is recommended at 1x/ week x 4 weeks to help pt regain her previous improvements. Potential for further progress is good. Will request additional visits. Plan:   [x]  Continue Plan of care: Treatment covered based on POC and graduated to patient's progress.    []  [address] [facility] of care:   []  Discharge:      Haris PEPE/JOHN [id number]

## 2020-02-27 ENCOUNTER — TREATMENT (OUTPATIENT)
Dept: OCCUPATIONAL THERAPY | Age: 40
End: 2020-02-27
Payer: MEDICAID

## 2020-02-27 PROCEDURE — 97140 MANUAL THERAPY 1/> REGIONS: CPT | Performed by: OCCUPATIONAL THERAPIST

## 2020-02-27 PROCEDURE — 97110 THERAPEUTIC EXERCISES: CPT | Performed by: OCCUPATIONAL THERAPIST

## 2020-02-27 PROCEDURE — 97022 WHIRLPOOL THERAPY: CPT | Performed by: OCCUPATIONAL THERAPIST

## 2020-02-27 PROCEDURE — 97530 THERAPEUTIC ACTIVITIES: CPT | Performed by: OCCUPATIONAL THERAPIST

## 2020-02-27 NOTE — PROGRESS NOTES
OCCUPATIONAL THERAPY   PROGRESS NOTE/ STATUS UPDATE    Date:  2020  Initial Evaluation Date: 19    Patient Name:  Florina Caballero    :  1980    Restrictions/Precautions: Activity as tolerated, Low fall risk  Diagnosis:  Right wrist TFCC injury                                                    Insurance/Certification information:  [address]  Referring Physician:  Dr Mónica Ba  Date of Surgery/Injury: surgery 19  Plan of care signed (Y/N):  Y  Visit# / total visits:  (approval thru 20/ auth # [id number])    Pain Level: 3/10, stiff and sore,  tight, in the right wrist    Subjective:  \"My pain is a lot better since the last time I was here. \"     Objective:  Updated POC to be completed by visit 12. INTERVENTION: COMPLETED: SPECIFICS/COMMENTS:   Modality:     Fluidotherapy x 20 min today as preconditioning prior to exercise due to increased pain. US x wrist x  5 min post activity, 3.3MHz, 50% . 6   AROM:     Wrist AROM/ AAROM 10x All planes   Digit AROM  Tendon glides   Exercise balls x    wristciser 7x              PROM/Stretching:     gentle PROM wrist x    Ulnar Nerve glides x    Manual techniques:     Soft tissue mob x         Strengthening:     Ananda bar- beige 2-10 Wrist twist in/ out, pronation and supination. Digiflex/ red x Composite 10x- individual 5x   Modified wall pushups x    Wt'd dowel 10 reps x2. 1# wt- forearm rotation/ deviations- sore with UD   Putty ex-soft x Gentle gripping 2 min, rolling with wrist/ digit ext x 2 min and light weight bearing   PRE's R wrist 20 reps  2# wt flexion/ext, 1# RD/UD- pulling in the ulnar hand   Other: HEP     Theraputty ex's  x Issued theraputty today. Assessment/Comments: Pt is making Good progress toward stated plan of care up to today.  Pt. Tolerated all resistive exercises today and will focus on strengthening for her remaining visits.       -Rehab Potential: Good  -Requires OT Follow Up: Yes  Time In: 805 Time Out: 0192     Visit #1    Treatment Charges: Min Units  used Units approved Units  remaining   Fluidotherapy 10 1 16 15   Ther Exercise 20 1 16 15   Manual Therapy 15 1 16 15   Thera Activities 15 1 16 15   US  0 16    Other       Total Time/Units 60 4         Goals: Goals for pt can be see on initial eval occurring on 11-26-19    Plan:   [x]  Continue Plan of care: Treatment covered based on POC and graduated to patient's progress.    []  [address] of care:   []  Discharge:      Ivory PEPE/JOHN [id number]

## 2020-03-05 ENCOUNTER — TREATMENT (OUTPATIENT)
Dept: OCCUPATIONAL THERAPY | Age: 40
End: 2020-03-05
Payer: MEDICAID

## 2020-03-05 PROCEDURE — 97022 WHIRLPOOL THERAPY: CPT | Performed by: OCCUPATIONAL THERAPY ASSISTANT

## 2020-03-05 PROCEDURE — 97530 THERAPEUTIC ACTIVITIES: CPT | Performed by: OCCUPATIONAL THERAPY ASSISTANT

## 2020-03-05 PROCEDURE — 97110 THERAPEUTIC EXERCISES: CPT | Performed by: OCCUPATIONAL THERAPY ASSISTANT

## 2020-03-05 PROCEDURE — 97140 MANUAL THERAPY 1/> REGIONS: CPT | Performed by: OCCUPATIONAL THERAPY ASSISTANT

## 2020-03-05 NOTE — PROGRESS NOTES
OCCUPATIONAL THERAPY   PROGRESS NOTE/ STATUS UPDATE    Date:  3/5/2020  Initial Evaluation Date: 19    Patient Name:  Tomás Locke    :  1980    Restrictions/Precautions: Activity as tolerated, Low fall risk  Diagnosis:  Right wrist TFCC injury                                                    Insurance/Certification information:  [address]  Referring Physician:  Dr Cindy Soto  Date of Surgery/Injury: surgery 19  Plan of care signed (Y/N):  Y  Visit# / total visits: / (approval thru 20/ auth # [id number])    Pain Level: 2-310, stiff and sore,  tight, in the right wrist    Subjective:  \"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 INTERVENTION: COMPLETED: SPECIFICS/COMMENTS:   Modality:     Fluidotherapy x 20 min today as preconditioning prior to exercise due to increased pain. US x wrist x  5 min post activity, 3.3MHz, 50% . 6   AROM:     Wrist AROM/ AAROM 10x All planes   Digit AROM  Tendon glides   Exercise balls x    wristciser 7x              PROM/Stretching:     gentle PROM wrist x    Ulnar Nerve glides x    Manual techniques:     Soft tissue mob x         Strengthening:     Ananda bar- green^ 20 reps Wrist twist in/ out, pronation and supination. Digiflex/ green^ x Composite 10x- individual 5x   Modified wall pushups x    Wt'd dowel 10 reps x2. 2# wt^- forearm rotation/ deviations- sore with UD   Putty ex-soft x  gripping 2 min, rolling with wrist/ digit ext x 2 min and light weight bearing   PRE's R wrist 20 reps  3#^ wt flexion/ext, 3#^ RD/UD- pulling in the ulnar hand   Other: HEP     Theraputty ex's  x Issued theraputty today. Assessment/Comments: Pt is making Good progress toward stated plan of care up to today.  Pt. Tolerated all increases in resistance today.       -Rehab Potential: Good  -Requires OT Follow Up: Yes  Time In: 830      Time Out: 930     Visit #2    Treatment Charges: Min Units  used Units approved Units  remaining   Fluidotherapy 10 1 16 14   Ther Exercise 20 1 16 14   Manual Therapy 15 1 16 14   Thera Activities 15 1 16 14   US  0 16    Other       Total Time/Units 60 4         Goals: Goals for pt can be see on initial eval occurring on 11-26-19    Plan:   [x]  Continue Plan of care: Treatment covered based on POC and graduated to patient's progress.    []  [address] of care:   []  Discharge:      Esme PEPE/JOHN [id number]

## 2020-03-12 ENCOUNTER — TREATMENT (OUTPATIENT)
Dept: OCCUPATIONAL THERAPY | Age: 40
End: 2020-03-12
Payer: MEDICAID

## 2020-03-12 PROCEDURE — 97022 WHIRLPOOL THERAPY: CPT | Performed by: OCCUPATIONAL THERAPIST

## 2020-03-12 PROCEDURE — 97140 MANUAL THERAPY 1/> REGIONS: CPT | Performed by: OCCUPATIONAL THERAPIST

## 2020-03-12 PROCEDURE — 97110 THERAPEUTIC EXERCISES: CPT | Performed by: OCCUPATIONAL THERAPIST

## 2020-03-12 NOTE — PROGRESS NOTES
OCCUPATIONAL THERAPY   PROGRESS NOTE/ STATUS UPDATE    Date:  3/12/2020  Initial Evaluation Date: 19    Patient Name:  Caor Rahman    :  1980    Restrictions/Precautions: Activity as tolerated, Low fall risk  Diagnosis:  Right wrist TFCC injury                                                    Insurance/Certification information:  [address]  Referring Physician:  Dr Yi Marshall  Date of Surgery/Injury: surgery 19  Plan of care signed (Y/N):  Y  Visit# / total visits: 3/4 (approval thru 20/ auth # [id number])    Pain Level: 1/10, stiff and sore,  tight, in the right wrist    Subjective:  \"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 INTERVENTION: COMPLETED: SPECIFICS/COMMENTS:   Modality:     Fluidotherapy x 20 min today as preconditioning prior to exercise due to increased pain. US x wrist x  5 min post activity, 3.3MHz, 50% . 6   AROM:     Wrist AROM/ AAROM 10x All planes   Digit AROM x Tendon glides   Exercise balls x    wristciser 7x              PROM/Stretching:     gentle PROM wrist x    Ulnar Nerve glides x    Manual techniques:     Soft tissue mob x         Strengthening:     Malcolm bar- green^ 20 reps Wrist twist in/ out, pronation and supination. Digiflex/ green^ x Composite 10x- individual 5x   Modified wall pushups x    Wt'd dowel 10 reps x2. 2# wt^- forearm rotation/ deviations- sore with UD   UBE 6 mins 3 mins forward and 3 mins backward   Dumbbell ex's  10 reps x3 3# wt. Putty ex-soft x  gripping 2 min, rolling with wrist/ digit ext x 2 min and light weight bearing   PRE's R wrist 20 reps  3#^ wt flexion/ext, 3#^ RD/UD- pulling in the ulnar hand   Other: HEP     Theraputty ex's  x Issued theraputty today. Assessment/Comments: Pt is making Good progress toward stated plan of care up to today. Pt. Tolerated all increases in resistance today.  Incorporated strengthening for whole arm to increase endurance with her job tasks. -Rehab Potential: Good  -Requires OT Follow Up: Yes  Time In: 0815     Time Out: 0915     Visit #3    Treatment Charges: Min Units  used Units approved Units  remaining   Fluidotherapy 10 1 16 13   Ther Exercise 30 2 16 12   Manual Therapy 15 1 16 13   Thera Activities 5 0 16 14   US  0 16    Other       Total Time/Units 60 4         Goals: Goals for pt can be see on initial eval occurring on 11-26-19    Plan:   [x]  Continue Plan of care: Treatment covered based on POC and graduated to patient's progress.    []  [address] of care:   []  Discharge:      Ulysses Murrain COTA/JOHN [id number]

## 2020-05-21 ENCOUNTER — PREP FOR PROCEDURE (OUTPATIENT)
Dept: UROLOGY | Age: 40
End: 2020-05-21

## 2020-05-21 RX ORDER — SODIUM CHLORIDE 0.9 % (FLUSH) 0.9 %
10 SYRINGE (ML) INJECTION EVERY 12 HOURS SCHEDULED
Status: CANCELLED | OUTPATIENT
Start: 2020-05-21

## 2020-05-21 RX ORDER — OXYCODONE HYDROCHLORIDE AND ACETAMINOPHEN 5; 325 MG/1; MG/1
1 TABLET ORAL EVERY 4 HOURS PRN
COMMUNITY
End: 2021-04-13 | Stop reason: ALTCHOICE

## 2020-05-21 RX ORDER — SODIUM CHLORIDE 0.9 % (FLUSH) 0.9 %
10 SYRINGE (ML) INJECTION PRN
Status: CANCELLED | OUTPATIENT
Start: 2020-05-21

## 2020-05-21 RX ORDER — SODIUM CHLORIDE 9 MG/ML
INJECTION, SOLUTION INTRAVENOUS CONTINUOUS
Status: CANCELLED | OUTPATIENT
Start: 2020-05-21

## 2020-05-21 NOTE — PROGRESS NOTES

## 2020-05-22 ENCOUNTER — HOSPITAL ENCOUNTER (OUTPATIENT)
Dept: GENERAL RADIOLOGY | Age: 40
Discharge: HOME | End: 2020-05-24
Attending: UROLOGY
Payer: MEDICAID

## 2020-05-22 ENCOUNTER — HOSPITAL ENCOUNTER (OUTPATIENT)
Age: 40
Setting detail: OUTPATIENT SURGERY
Discharge: HOME | End: 2020-05-22
Attending: UROLOGY | Admitting: UROLOGY
Payer: MEDICAID

## 2020-05-22 ENCOUNTER — ANESTHESIA EVENT (OUTPATIENT)
Dept: OPERATING ROOM | Age: 40
End: 2020-05-22
Payer: MEDICAID

## 2020-05-22 ENCOUNTER — ANESTHESIA (OUTPATIENT)
Dept: OPERATING ROOM | Age: 40
End: 2020-05-22
Payer: MEDICAID

## 2020-05-22 VITALS
SYSTOLIC BLOOD PRESSURE: 137 MMHG | WEIGHT: 222 LBS | BODY MASS INDEX: 41.91 KG/M2 | DIASTOLIC BLOOD PRESSURE: 82 MMHG | RESPIRATION RATE: 16 BRPM | HEART RATE: 81 BPM | OXYGEN SATURATION: 96 % | HEIGHT: 61 IN | TEMPERATURE: 97.1 F

## 2020-05-22 VITALS — SYSTOLIC BLOOD PRESSURE: 131 MMHG | OXYGEN SATURATION: 89 % | DIASTOLIC BLOOD PRESSURE: 77 MMHG

## 2020-05-22 LAB — METER GLUCOSE: 116 MG/DL (ref 74–99)

## 2020-05-22 PROCEDURE — 6370000000 HC RX 637 (ALT 250 FOR IP): Performed by: UROLOGY

## 2020-05-22 PROCEDURE — 3600000013 HC SURGERY LEVEL 3 ADDTL 15MIN: Performed by: UROLOGY

## 2020-05-22 PROCEDURE — 82962 GLUCOSE BLOOD TEST: CPT

## 2020-05-22 PROCEDURE — 6360000002 HC RX W HCPCS: Performed by: NURSE ANESTHETIST, CERTIFIED REGISTERED

## 2020-05-22 PROCEDURE — 6360000002 HC RX W HCPCS: Performed by: ANESTHESIOLOGY

## 2020-05-22 PROCEDURE — 74420 UROGRAPHY RTRGR +-KUB: CPT

## 2020-05-22 PROCEDURE — 2709999900 HC NON-CHARGEABLE SUPPLY: Performed by: UROLOGY

## 2020-05-22 PROCEDURE — C1769 GUIDE WIRE: HCPCS | Performed by: UROLOGY

## 2020-05-22 PROCEDURE — 87088 URINE BACTERIA CULTURE: CPT

## 2020-05-22 PROCEDURE — 7100000011 HC PHASE II RECOVERY - ADDTL 15 MIN: Performed by: UROLOGY

## 2020-05-22 PROCEDURE — 6360000002 HC RX W HCPCS: Performed by: NURSE PRACTITIONER

## 2020-05-22 PROCEDURE — 2580000003 HC RX 258: Performed by: NURSE PRACTITIONER

## 2020-05-22 PROCEDURE — 2580000003 HC RX 258: Performed by: NURSE ANESTHETIST, CERTIFIED REGISTERED

## 2020-05-22 PROCEDURE — 2720000010 HC SURG SUPPLY STERILE: Performed by: UROLOGY

## 2020-05-22 PROCEDURE — 3700000000 HC ANESTHESIA ATTENDED CARE: Performed by: UROLOGY

## 2020-05-22 PROCEDURE — C2617 STENT, NON-COR, TEM W/O DEL: HCPCS | Performed by: UROLOGY

## 2020-05-22 PROCEDURE — 6360000004 HC RX CONTRAST MEDICATION: Performed by: UROLOGY

## 2020-05-22 PROCEDURE — 3700000001 HC ADD 15 MINUTES (ANESTHESIA): Performed by: UROLOGY

## 2020-05-22 PROCEDURE — 3600000003 HC SURGERY LEVEL 3 BASE: Performed by: UROLOGY

## 2020-05-22 PROCEDURE — 7100000010 HC PHASE II RECOVERY - FIRST 15 MIN: Performed by: UROLOGY

## 2020-05-22 DEVICE — UNIVERSA FIRM URETERAL STENT AND POSITIONER WITH HYDROPHILIC COATING
Type: IMPLANTABLE DEVICE | Site: URETER | Status: FUNCTIONAL
Brand: UNIVERSA

## 2020-05-22 RX ORDER — SODIUM CHLORIDE 9 MG/ML
INJECTION, SOLUTION INTRAVENOUS CONTINUOUS PRN
Status: DISCONTINUED | OUTPATIENT
Start: 2020-05-22 | End: 2020-05-22 | Stop reason: SDUPTHER

## 2020-05-22 RX ORDER — PROPOFOL 10 MG/ML
INJECTION, EMULSION INTRAVENOUS CONTINUOUS PRN
Status: DISCONTINUED | OUTPATIENT
Start: 2020-05-22 | End: 2020-05-22 | Stop reason: SDUPTHER

## 2020-05-22 RX ORDER — FENTANYL CITRATE 50 UG/ML
50 INJECTION, SOLUTION INTRAMUSCULAR; INTRAVENOUS EVERY 5 MIN PRN
Status: DISCONTINUED | OUTPATIENT
Start: 2020-05-22 | End: 2020-05-22 | Stop reason: HOSPADM

## 2020-05-22 RX ORDER — TAMSULOSIN HYDROCHLORIDE 0.4 MG/1
0.4 CAPSULE ORAL DAILY
Qty: 10 CAPSULE | Refills: 1 | Status: SHIPPED | OUTPATIENT
Start: 2020-05-22 | End: 2021-04-13 | Stop reason: ALTCHOICE

## 2020-05-22 RX ORDER — CEFAZOLIN SODIUM 2 G/50ML
2 SOLUTION INTRAVENOUS
Status: COMPLETED | OUTPATIENT
Start: 2020-05-22 | End: 2020-05-22

## 2020-05-22 RX ORDER — OXYCODONE HYDROCHLORIDE AND ACETAMINOPHEN 5; 325 MG/1; MG/1
1 TABLET ORAL
Status: DISCONTINUED | OUTPATIENT
Start: 2020-05-22 | End: 2020-05-22 | Stop reason: HOSPADM

## 2020-05-22 RX ORDER — PHENAZOPYRIDINE HYDROCHLORIDE 200 MG/1
200 TABLET, FILM COATED ORAL 2 TIMES DAILY PRN
Qty: 10 TABLET | Refills: 2 | Status: SHIPPED | OUTPATIENT
Start: 2020-05-22 | End: 2021-04-13 | Stop reason: ALTCHOICE

## 2020-05-22 RX ORDER — ONDANSETRON 2 MG/ML
4 INJECTION INTRAMUSCULAR; INTRAVENOUS EVERY 6 HOURS PRN
Status: DISCONTINUED | OUTPATIENT
Start: 2020-05-22 | End: 2020-05-22 | Stop reason: HOSPADM

## 2020-05-22 RX ORDER — PROMETHAZINE HYDROCHLORIDE 25 MG/ML
6.25 INJECTION, SOLUTION INTRAMUSCULAR; INTRAVENOUS PRN
Status: DISCONTINUED | OUTPATIENT
Start: 2020-05-22 | End: 2020-05-22 | Stop reason: HOSPADM

## 2020-05-22 RX ORDER — HYDROCODONE BITARTRATE AND ACETAMINOPHEN 5; 325 MG/1; MG/1
1 TABLET ORAL EVERY 4 HOURS PRN
Qty: 10 TABLET | Refills: 0 | Status: SHIPPED | OUTPATIENT
Start: 2020-05-22 | End: 2020-05-29

## 2020-05-22 RX ORDER — CEPHALEXIN 500 MG/1
500 CAPSULE ORAL 2 TIMES DAILY
Qty: 10 CAPSULE | Refills: 1 | Status: SHIPPED | OUTPATIENT
Start: 2020-05-22 | End: 2020-05-27

## 2020-05-22 RX ORDER — SODIUM CHLORIDE 0.9 % (FLUSH) 0.9 %
10 SYRINGE (ML) INJECTION PRN
Status: DISCONTINUED | OUTPATIENT
Start: 2020-05-22 | End: 2020-05-22 | Stop reason: HOSPADM

## 2020-05-22 RX ORDER — FENTANYL CITRATE 50 UG/ML
INJECTION, SOLUTION INTRAMUSCULAR; INTRAVENOUS PRN
Status: DISCONTINUED | OUTPATIENT
Start: 2020-05-22 | End: 2020-05-22 | Stop reason: SDUPTHER

## 2020-05-22 RX ORDER — SODIUM CHLORIDE 9 MG/ML
INJECTION, SOLUTION INTRAVENOUS CONTINUOUS
Status: DISCONTINUED | OUTPATIENT
Start: 2020-05-22 | End: 2020-05-22 | Stop reason: HOSPADM

## 2020-05-22 RX ORDER — OXYCODONE HYDROCHLORIDE AND ACETAMINOPHEN 5; 325 MG/1; MG/1
1 TABLET ORAL EVERY 6 HOURS PRN
Status: DISCONTINUED | OUTPATIENT
Start: 2020-05-22 | End: 2020-05-22 | Stop reason: HOSPADM

## 2020-05-22 RX ORDER — MEPERIDINE HYDROCHLORIDE 25 MG/ML
12.5 INJECTION INTRAMUSCULAR; INTRAVENOUS; SUBCUTANEOUS EVERY 10 MIN PRN
Status: DISCONTINUED | OUTPATIENT
Start: 2020-05-22 | End: 2020-05-22 | Stop reason: HOSPADM

## 2020-05-22 RX ORDER — MIDAZOLAM HYDROCHLORIDE 1 MG/ML
INJECTION INTRAMUSCULAR; INTRAVENOUS PRN
Status: DISCONTINUED | OUTPATIENT
Start: 2020-05-22 | End: 2020-05-22 | Stop reason: SDUPTHER

## 2020-05-22 RX ORDER — SODIUM CHLORIDE 0.9 % (FLUSH) 0.9 %
10 SYRINGE (ML) INJECTION EVERY 12 HOURS SCHEDULED
Status: DISCONTINUED | OUTPATIENT
Start: 2020-05-22 | End: 2020-05-22 | Stop reason: HOSPADM

## 2020-05-22 RX ADMIN — MIDAZOLAM 2 MG: 1 INJECTION INTRAMUSCULAR; INTRAVENOUS at 14:45

## 2020-05-22 RX ADMIN — SODIUM CHLORIDE: 9 INJECTION, SOLUTION INTRAVENOUS at 13:10

## 2020-05-22 RX ADMIN — SODIUM CHLORIDE: 9 INJECTION, SOLUTION INTRAVENOUS at 14:37

## 2020-05-22 RX ADMIN — PROPOFOL 99 MCG/KG/MIN: 10 INJECTION, EMULSION INTRAVENOUS at 14:49

## 2020-05-22 RX ADMIN — CEFAZOLIN SODIUM 2 G: 2 SOLUTION INTRAVENOUS at 14:45

## 2020-05-22 RX ADMIN — OXYCODONE HYDROCHLORIDE AND ACETAMINOPHEN 1 TABLET: 5; 325 TABLET ORAL at 16:14

## 2020-05-22 RX ADMIN — FENTANYL CITRATE 50 MCG: 50 INJECTION, SOLUTION INTRAMUSCULAR; INTRAVENOUS at 15:52

## 2020-05-22 RX ADMIN — FENTANYL CITRATE 100 MCG: 50 INJECTION, SOLUTION INTRAMUSCULAR; INTRAVENOUS at 14:49

## 2020-05-22 ASSESSMENT — PULMONARY FUNCTION TESTS
PIF_VALUE: 1
PIF_VALUE: 0
PIF_VALUE: 0
PIF_VALUE: 1
PIF_VALUE: 0
PIF_VALUE: 1
PIF_VALUE: 0
PIF_VALUE: 1
PIF_VALUE: 2
PIF_VALUE: 1
PIF_VALUE: 0
PIF_VALUE: 1
PIF_VALUE: 0
PIF_VALUE: 1
PIF_VALUE: 0
PIF_VALUE: 1

## 2020-05-22 ASSESSMENT — PAIN - FUNCTIONAL ASSESSMENT: PAIN_FUNCTIONAL_ASSESSMENT: 0-10

## 2020-05-22 ASSESSMENT — PAIN SCALES - GENERAL
PAINLEVEL_OUTOF10: 10
PAINLEVEL_OUTOF10: 6

## 2020-05-22 ASSESSMENT — LIFESTYLE VARIABLES: SMOKING_STATUS: 1

## 2020-05-22 NOTE — PROGRESS NOTES
CLINICAL PHARMACY NOTE: MEDS TO [address] Select Patient?: No  Total # of Prescriptions Filled: 4   The following medications were delivered to the patient:  · tamsulosin 0.4 mg  · Phenazopyridine 200 mg  · Cephalexin 500 mg  · Hydrocodone 5-325 mg  Total # of Interventions Completed: 6  Time Spent (min): 45    Additional Documentation:

## 2020-05-22 NOTE — ANESTHESIA PRE PROCEDURE
Department of Anesthesiology  Preprocedure Note       Name:  Matias Gifford   Age:  37 y.o.  :  1980                                          MRN:  [id number]         Date:  2020      Surgeon: Alberto Harmon):  Paul Lopez DO    Procedure: CYSTOSCOPY RETROGRADE PYELOGRAM LASER LITHOTRIPSY, UTERETEROSCOPY, RIGHT STRING STENT INSERTION (Right )    Medications prior to admission:   Prior to Admission medications    Medication Sig Start Date End Date Taking? Authorizing Provider   oxyCODONE-acetaminophen (PERCOCET) 5-325 MG per tablet Take 1 tablet by mouth every 4 hours as needed for Pain. Historical Provider, MD   famotidine (PEPCID) 10 MG tablet Take by mouth 2 times daily as needed Instructed to take am of procedure 20   Historical Provider, MD   ibuprofen (ADVIL;MOTRIN) 600 MG tablet Take 1 tablet by mouth 3 times daily as needed for Pain  Patient taking differently: Take 600 mg by mouth 3 times daily as needed for Pain LD 19   Ryder Pelaez MD   buPROPion (WELLBUTRIN XL) 150 MG extended release tablet Take 1 tablet by mouth Takes 450 mg total    Historical Provider, MD   buPROPion (WELLBUTRIN XL) 300 MG extended release tablet Take 1 tablet by mouth    Historical Provider, MD   cetirizine (ZYRTEC) 10 MG tablet Take 10 mg by mouth nightly     Historical Provider, MD   metFORMIN, MOD, (GLUMETZA) 500 MG extended release tablet Take 500 mg by mouth daily (with breakfast)    Historical Provider, MD   Cholecalciferol (VITAMIN D PO) Take by mouth daily     Historical Provider, MD   Cyanocobalamin (VITAMIN B 12 PO) Take by mouth LD 20    Historical Provider, MD   gabapentin (NEURONTIN) 600 MG tablet Take 800 mg by mouth 3 times daily. Sera Hernadez Historical Provider, MD       Current medications:    No current outpatient medications on file. No current facility-administered medications for this visit. Allergies:     Allergies   Allergen Reactions    Tape [Adhesive Tape] Hives

## 2020-05-25 LAB — URINE CULTURE, ROUTINE: NORMAL

## 2020-12-02 ENCOUNTER — HOSPITAL ENCOUNTER (OUTPATIENT)
Age: 40
Discharge: HOME | End: 2020-12-02
Payer: MEDICAID

## 2020-12-02 LAB
ALBUMIN SERPL-MCNC: 4 G/DL (ref 3.5–5.2)
ALP BLD-CCNC: 90 U/L (ref 35–104)
ALT SERPL-CCNC: 14 U/L (ref 0–32)
AMMONIA: 34 UMOL/L (ref 11–51)
AMPHETAMINE SCREEN, URINE: NOT DETECTED
ANION GAP SERPL CALCULATED.3IONS-SCNC: 7 MMOL/L (ref 7–16)
AST SERPL-CCNC: 13 U/L (ref 0–31)
BACTERIA: ABNORMAL /HPF
BARBITURATE SCREEN URINE: NOT DETECTED
BASOPHILS ABSOLUTE: 0.08 E9/L (ref 0–0.2)
BASOPHILS RELATIVE PERCENT: 1.2 % (ref 0–2)
BENZODIAZEPINE SCREEN, URINE: NOT DETECTED
BILIRUB SERPL-MCNC: 0.3 MG/DL (ref 0–1.2)
BILIRUBIN URINE: NEGATIVE
BLOOD, URINE: NEGATIVE
BUN BLDV-MCNC: 7 MG/DL (ref 6–20)
CALCIUM SERPL-MCNC: 9 MG/DL (ref 8.6–10.2)
CANNABINOID SCREEN URINE: NOT DETECTED
CHLORIDE BLD-SCNC: 104 MMOL/L (ref 98–107)
CHOLESTEROL, FASTING: 156 MG/DL (ref 0–199)
CLARITY: ABNORMAL
CO2: 28 MMOL/L (ref 22–29)
COCAINE METABOLITE SCREEN URINE: POSITIVE
COLOR: YELLOW
CREAT SERPL-MCNC: 1 MG/DL (ref 0.5–1)
CRYSTALS, UA: ABNORMAL /HPF
EOSINOPHILS ABSOLUTE: 0.08 E9/L (ref 0.05–0.5)
EOSINOPHILS RELATIVE PERCENT: 1.2 % (ref 0–6)
EPITHELIAL CELLS, UA: ABNORMAL /HPF
FENTANYL SCREEN, URINE: NOT DETECTED
GFR AFRICAN AMERICAN: >60
GFR NON-AFRICAN AMERICAN: >60 ML/MIN/1.73
GLUCOSE FASTING: 98 MG/DL (ref 74–99)
GLUCOSE URINE: NEGATIVE MG/DL
HBA1C MFR BLD: 5.9 % (ref 4–5.6)
HCG QUALITATIVE: NEGATIVE
HCT VFR BLD CALC: 41.9 % (ref 34–48)
HDLC SERPL-MCNC: 36 MG/DL
HEMOGLOBIN: 13.9 G/DL (ref 11.5–15.5)
IMMATURE GRANULOCYTES #: 0.02 E9/L
IMMATURE GRANULOCYTES %: 0.3 % (ref 0–5)
KETONES, URINE: ABNORMAL MG/DL
LDL CHOLESTEROL CALCULATED: 93 MG/DL (ref 0–99)
LEUKOCYTE ESTERASE, URINE: NEGATIVE
LYMPHOCYTES ABSOLUTE: 2.03 E9/L (ref 1.5–4)
LYMPHOCYTES RELATIVE PERCENT: 30.2 % (ref 20–42)
Lab: ABNORMAL
MCH RBC QN AUTO: 29.6 PG (ref 26–35)
MCHC RBC AUTO-ENTMCNC: 33.2 % (ref 32–34.5)
MCV RBC AUTO: 89.1 FL (ref 80–99.9)
METHADONE SCREEN, URINE: NOT DETECTED
MONOCYTES ABSOLUTE: 0.4 E9/L (ref 0.1–0.95)
MONOCYTES RELATIVE PERCENT: 5.9 % (ref 2–12)
NEUTROPHILS ABSOLUTE: 4.12 E9/L (ref 1.8–7.3)
NEUTROPHILS RELATIVE PERCENT: 61.2 % (ref 43–80)
NITRITE, URINE: NEGATIVE
OPIATE SCREEN URINE: NOT DETECTED
OXYCODONE URINE: NOT DETECTED
PDW BLD-RTO: 13.3 FL (ref 11.5–15)
PH UA: 6.5 (ref 5–9)
PHENCYCLIDINE SCREEN URINE: NOT DETECTED
PLATELET # BLD: 284 E9/L (ref 130–450)
PMV BLD AUTO: 10.4 FL (ref 7–12)
POTASSIUM SERPL-SCNC: 4.1 MMOL/L (ref 3.5–5)
PROTEIN UA: NEGATIVE MG/DL
RBC # BLD: 4.7 E12/L (ref 3.5–5.5)
RBC UA: ABNORMAL /HPF (ref 0–2)
SODIUM BLD-SCNC: 139 MMOL/L (ref 132–146)
SPECIFIC GRAVITY UA: 1.02 (ref 1–1.03)
T3 FREE: 3 PG/ML (ref 2–4.4)
T4 FREE: 0.96 NG/DL (ref 0.93–1.7)
TOTAL PROTEIN: 7 G/DL (ref 6.4–8.3)
TRIGLYCERIDE, FASTING: 135 MG/DL (ref 0–149)
TSH SERPL DL<=0.05 MIU/L-ACNC: 1.42 UIU/ML (ref 0.27–4.2)
UROBILINOGEN, URINE: 1 E.U./DL
VALPROIC ACID LEVEL: 84 MCG/ML (ref 50–100)
VITAMIN B-12: 482 PG/ML (ref 211–946)
VITAMIN D 25-HYDROXY: 27 NG/ML (ref 30–100)
VLDLC SERPL CALC-MCNC: 27 MG/DL
WBC # BLD: 6.7 E9/L (ref 4.5–11.5)
WBC UA: ABNORMAL /HPF (ref 0–5)

## 2020-12-02 PROCEDURE — 85025 COMPLETE CBC W/AUTO DIFF WBC: CPT

## 2020-12-02 PROCEDURE — 80164 ASSAY DIPROPYLACETIC ACD TOT: CPT

## 2020-12-02 PROCEDURE — 84443 ASSAY THYROID STIM HORMONE: CPT

## 2020-12-02 PROCEDURE — 84439 ASSAY OF FREE THYROXINE: CPT

## 2020-12-02 PROCEDURE — 80061 LIPID PANEL: CPT

## 2020-12-02 PROCEDURE — 82607 VITAMIN B-12: CPT

## 2020-12-02 PROCEDURE — 82306 VITAMIN D 25 HYDROXY: CPT

## 2020-12-02 PROCEDURE — 83036 HEMOGLOBIN GLYCOSYLATED A1C: CPT

## 2020-12-02 PROCEDURE — 80053 COMPREHEN METABOLIC PANEL: CPT

## 2020-12-02 PROCEDURE — 84703 CHORIONIC GONADOTROPIN ASSAY: CPT

## 2020-12-02 PROCEDURE — 87088 URINE BACTERIA CULTURE: CPT

## 2020-12-02 PROCEDURE — 80307 DRUG TEST PRSMV CHEM ANLYZR: CPT

## 2020-12-02 PROCEDURE — 82140 ASSAY OF AMMONIA: CPT

## 2020-12-02 PROCEDURE — 81001 URINALYSIS AUTO W/SCOPE: CPT

## 2020-12-02 PROCEDURE — 84481 FREE ASSAY (FT-3): CPT

## 2020-12-02 PROCEDURE — 36415 COLL VENOUS BLD VENIPUNCTURE: CPT

## 2020-12-04 LAB — URINE CULTURE, ROUTINE: NORMAL

## 2020-12-12 ENCOUNTER — HOSPITAL ENCOUNTER (EMERGENCY)
Age: 40
Discharge: HOME | End: 2020-12-12
Payer: MEDICAID

## 2020-12-12 ENCOUNTER — APPOINTMENT (OUTPATIENT)
Dept: CT IMAGING | Age: 40
End: 2020-12-12
Payer: MEDICAID

## 2020-12-12 VITALS
HEART RATE: 71 BPM | BODY MASS INDEX: 39.08 KG/M2 | TEMPERATURE: 98.5 F | OXYGEN SATURATION: 97 % | SYSTOLIC BLOOD PRESSURE: 158 MMHG | DIASTOLIC BLOOD PRESSURE: 102 MMHG | HEIGHT: 61 IN | RESPIRATION RATE: 20 BRPM | WEIGHT: 207 LBS

## 2020-12-12 LAB
ANION GAP SERPL CALCULATED.3IONS-SCNC: 9 MMOL/L (ref 7–16)
BACTERIA: ABNORMAL /HPF
BASOPHILS ABSOLUTE: 0.04 E9/L (ref 0–0.2)
BASOPHILS RELATIVE PERCENT: 0.6 % (ref 0–2)
BILIRUBIN URINE: NEGATIVE
BLOOD, URINE: ABNORMAL
BUN BLDV-MCNC: 9 MG/DL (ref 6–20)
CALCIUM SERPL-MCNC: 8.7 MG/DL (ref 8.6–10.2)
CHLORIDE BLD-SCNC: 103 MMOL/L (ref 98–107)
CLARITY: CLEAR
CO2: 26 MMOL/L (ref 22–29)
COLOR: YELLOW
CREAT SERPL-MCNC: 1 MG/DL (ref 0.5–1)
CRYSTALS, UA: ABNORMAL /HPF
EOSINOPHILS ABSOLUTE: 0.08 E9/L (ref 0.05–0.5)
EOSINOPHILS RELATIVE PERCENT: 1.3 % (ref 0–6)
EPITHELIAL CELLS, UA: ABNORMAL /HPF
GFR AFRICAN AMERICAN: >60
GFR NON-AFRICAN AMERICAN: >60 ML/MIN/1.73
GLUCOSE BLD-MCNC: 93 MG/DL (ref 74–99)
GLUCOSE URINE: NEGATIVE MG/DL
HCT VFR BLD CALC: 37.9 % (ref 34–48)
HEMOGLOBIN: 12.3 G/DL (ref 11.5–15.5)
IMMATURE GRANULOCYTES #: 0.01 E9/L
IMMATURE GRANULOCYTES %: 0.2 % (ref 0–5)
KETONES, URINE: NEGATIVE MG/DL
LEUKOCYTE ESTERASE, URINE: NEGATIVE
LYMPHOCYTES ABSOLUTE: 2.16 E9/L (ref 1.5–4)
LYMPHOCYTES RELATIVE PERCENT: 34.2 % (ref 20–42)
MCH RBC QN AUTO: 30.1 PG (ref 26–35)
MCHC RBC AUTO-ENTMCNC: 32.5 % (ref 32–34.5)
MCV RBC AUTO: 92.9 FL (ref 80–99.9)
MONOCYTES ABSOLUTE: 0.5 E9/L (ref 0.1–0.95)
MONOCYTES RELATIVE PERCENT: 7.9 % (ref 2–12)
NEUTROPHILS ABSOLUTE: 3.53 E9/L (ref 1.8–7.3)
NEUTROPHILS RELATIVE PERCENT: 55.8 % (ref 43–80)
NITRITE, URINE: NEGATIVE
PDW BLD-RTO: 13.7 FL (ref 11.5–15)
PH UA: 6.5 (ref 5–9)
PLATELET # BLD: 221 E9/L (ref 130–450)
PMV BLD AUTO: 11.1 FL (ref 7–12)
POTASSIUM SERPL-SCNC: 3.2 MMOL/L (ref 3.5–5)
PROTEIN UA: NEGATIVE MG/DL
RBC # BLD: 4.08 E12/L (ref 3.5–5.5)
RBC UA: ABNORMAL /HPF (ref 0–2)
SODIUM BLD-SCNC: 138 MMOL/L (ref 132–146)
SPECIFIC GRAVITY UA: 1.02 (ref 1–1.03)
UROBILINOGEN, URINE: 0.2 E.U./DL
WBC # BLD: 6.3 E9/L (ref 4.5–11.5)
WBC UA: ABNORMAL /HPF (ref 0–5)

## 2020-12-12 PROCEDURE — 99285 EMERGENCY DEPT VISIT HI MDM: CPT

## 2020-12-12 PROCEDURE — 85025 COMPLETE CBC W/AUTO DIFF WBC: CPT

## 2020-12-12 PROCEDURE — 6370000000 HC RX 637 (ALT 250 FOR IP): Performed by: NURSE PRACTITIONER

## 2020-12-12 PROCEDURE — 2580000003 HC RX 258: Performed by: NURSE PRACTITIONER

## 2020-12-12 PROCEDURE — 96375 TX/PRO/DX INJ NEW DRUG ADDON: CPT

## 2020-12-12 PROCEDURE — 6360000002 HC RX W HCPCS: Performed by: NURSE PRACTITIONER

## 2020-12-12 PROCEDURE — 81001 URINALYSIS AUTO W/SCOPE: CPT

## 2020-12-12 PROCEDURE — 96374 THER/PROPH/DIAG INJ IV PUSH: CPT

## 2020-12-12 PROCEDURE — 36415 COLL VENOUS BLD VENIPUNCTURE: CPT

## 2020-12-12 PROCEDURE — 80048 BASIC METABOLIC PNL TOTAL CA: CPT

## 2020-12-12 PROCEDURE — 74176 CT ABD & PELVIS W/O CONTRAST: CPT

## 2020-12-12 RX ORDER — HYDROCODONE BITARTRATE AND ACETAMINOPHEN 5; 325 MG/1; MG/1
1 TABLET ORAL ONCE
Status: COMPLETED | OUTPATIENT
Start: 2020-12-12 | End: 2020-12-12

## 2020-12-12 RX ORDER — HYDROCODONE BITARTRATE AND ACETAMINOPHEN 5; 325 MG/1; MG/1
1 TABLET ORAL EVERY 6 HOURS PRN
Qty: 12 TABLET | Refills: 0 | Status: SHIPPED | OUTPATIENT
Start: 2020-12-12 | End: 2020-12-15

## 2020-12-12 RX ORDER — 0.9 % SODIUM CHLORIDE 0.9 %
1000 INTRAVENOUS SOLUTION INTRAVENOUS ONCE
Status: COMPLETED | OUTPATIENT
Start: 2020-12-12 | End: 2020-12-12

## 2020-12-12 RX ORDER — MORPHINE SULFATE 4 MG/ML
4 INJECTION, SOLUTION INTRAMUSCULAR; INTRAVENOUS ONCE
Status: COMPLETED | OUTPATIENT
Start: 2020-12-12 | End: 2020-12-12

## 2020-12-12 RX ORDER — ONDANSETRON 2 MG/ML
4 INJECTION INTRAMUSCULAR; INTRAVENOUS ONCE
Status: COMPLETED | OUTPATIENT
Start: 2020-12-12 | End: 2020-12-12

## 2020-12-12 RX ORDER — POTASSIUM CHLORIDE 20 MEQ/1
40 TABLET, EXTENDED RELEASE ORAL ONCE
Status: COMPLETED | OUTPATIENT
Start: 2020-12-12 | End: 2020-12-12

## 2020-12-12 RX ORDER — KETOROLAC TROMETHAMINE 30 MG/ML
15 INJECTION, SOLUTION INTRAMUSCULAR; INTRAVENOUS ONCE
Status: COMPLETED | OUTPATIENT
Start: 2020-12-12 | End: 2020-12-12

## 2020-12-12 RX ADMIN — SODIUM CHLORIDE 1000 ML: 9 INJECTION, SOLUTION INTRAVENOUS at 21:57

## 2020-12-12 RX ADMIN — MORPHINE SULFATE 4 MG: 4 INJECTION, SOLUTION INTRAMUSCULAR; INTRAVENOUS at 23:09

## 2020-12-12 RX ADMIN — ONDANSETRON 4 MG: 2 INJECTION INTRAMUSCULAR; INTRAVENOUS at 21:57

## 2020-12-12 RX ADMIN — HYDROCODONE BITARTRATE AND ACETAMINOPHEN 1 TABLET: 5; 325 TABLET ORAL at 23:09

## 2020-12-12 RX ADMIN — KETOROLAC TROMETHAMINE 30 MG: 30 INJECTION, SOLUTION INTRAMUSCULAR; INTRAVENOUS at 21:57

## 2020-12-12 RX ADMIN — POTASSIUM CHLORIDE 40 MEQ: 20 TABLET, EXTENDED RELEASE ORAL at 23:09

## 2020-12-12 ASSESSMENT — PAIN SCALES - GENERAL
PAINLEVEL_OUTOF10: 10
PAINLEVEL_OUTOF10: 8

## 2020-12-12 ASSESSMENT — PAIN DESCRIPTION - FREQUENCY: FREQUENCY: CONTINUOUS

## 2020-12-12 ASSESSMENT — PAIN DESCRIPTION - LOCATION
LOCATION: FLANK
LOCATION: FLANK

## 2020-12-12 ASSESSMENT — PAIN DESCRIPTION - ORIENTATION
ORIENTATION: LEFT
ORIENTATION: LEFT

## 2020-12-12 ASSESSMENT — PAIN DESCRIPTION - PAIN TYPE: TYPE: ACUTE PAIN

## 2020-12-13 NOTE — ED PROVIDER NOTES
Independent MLP    HPI:  20,   Time: 11:01 PM GAMA Ross is a 41 y.o. female presenting to the ED for left flank pain, beginning pta ago. The complaint has been persistent, moderate in severity, and worsened by nothing. Complaining of an onset of left flank pain which she states began earlier today. She does report a past medical history of frequent and recurrent kidney stones. She has been seen and followed up with Dr. Paulino Coats. Denies any fever. States been she has been urinating normally. ROS:   Pertinent positives and negatives are stated within HPI, all other systems reviewed and are negative.  --------------------------------------------- PAST HISTORY ---------------------------------------------  Past Medical History:  has a past medical history of Anxiety, Depression, Diabetes mellitus ([facility] [address] Herpes genitalia, Kidney stones, Lower back pain, and Seasonal allergies. Past Surgical History:  has a past surgical history that includes LEEP;  section; Endometrial ablation; Tubal ligation; Dilation and curettage of uterus; Lithotripsy; Cystocopy; Colonoscopy; hysteroscopy (2017); other surgical history (Bilateral, 2017); Wrist arthroscopy (Right, 2019); Carpal tunnel release (Right, 2019); Hysterectomy; and Lithotripsy (Right, 2020). Social History:  reports that she has been smoking cigarettes. She has a 12.50 pack-year smoking history. She has never used smokeless tobacco. She reports that she does not drink alcohol or use drugs. Family History: family history includes Diabetes in her father; Heart Disease in her father and mother; High Blood Pressure in her father; Kidney Disease in her father; Other in her mother; Stroke in her father. The patients home medications have been reviewed.     Allergies: Tape [adhesive tape] -------------------------------------------------- RESULTS -------------------------------------------------  All laboratory and radiology results have been personally reviewed by myself   LABS:  Results for orders placed or performed during the hospital encounter of 12/12/20   Urinalysis   Result Value Ref Range    Color, UA Yellow Straw/Yellow    Clarity, UA Clear Clear    Glucose, Ur Negative Negative mg/dL    Bilirubin Urine Negative Negative    Ketones, Urine Negative Negative mg/dL    Specific Gravity, UA 1.020 1.005 - 1.030    Blood, Urine SMALL (A) Negative    pH, UA 6.5 5.0 - 9.0    Protein, UA Negative Negative mg/dL    Urobilinogen, Urine 0.2 <2.0 E.U./dL    Nitrite, Urine Negative Negative    Leukocyte Esterase, Urine Negative Negative   CBC Auto Differential   Result Value Ref Range    WBC 6.3 4.5 - 11.5 E9/L    RBC 4.08 3.50 - 5.50 E12/L    Hemoglobin 12.3 11.5 - 15.5 g/dL    Hematocrit 37.9 34.0 - 48.0 %    MCV 92.9 80.0 - 99.9 fL    MCH 30.1 26.0 - 35.0 pg    MCHC 32.5 32.0 - 34.5 %    RDW 13.7 11.5 - 15.0 fL    Platelets 099 102 - 819 E9/L    MPV 11.1 7.0 - 12.0 fL    Neutrophils % 55.8 43.0 - 80.0 %    Immature Granulocytes % 0.2 0.0 - 5.0 %    Lymphocytes % 34.2 20.0 - 42.0 %    Monocytes % 7.9 2.0 - 12.0 %    Eosinophils % 1.3 0.0 - 6.0 %    Basophils % 0.6 0.0 - 2.0 %    Neutrophils Absolute 3.53 1.80 - 7.30 E9/L    Immature Granulocytes # 0.01 E9/L    Lymphocytes Absolute 2.16 1.50 - 4.00 E9/L    Monocytes Absolute 0.50 0.10 - 0.95 E9/L    Eosinophils Absolute 0.08 0.05 - 0.50 E9/L    Basophils Absolute 0.04 0.00 - 0.20 B0/T   Basic Metabolic Panel   Result Value Ref Range    Sodium 138 132 - 146 mmol/L    Potassium 3.2 (L) 3.5 - 5.0 mmol/L    Chloride 103 98 - 107 mmol/L    CO2 26 22 - 29 mmol/L    Anion Gap 9 7 - 16 mmol/L    Glucose 93 74 - 99 mg/dL    BUN 9 6 - 20 mg/dL    CREATININE 1.0 0.5 - 1.0 mg/dL    GFR Non-African American >60 >=60 mL/min/1.73    GFR African American >60 Calcium 8.7 8.6 - 10.2 mg/dL       RADIOLOGY:  Interpreted by Radiologist.  CT ABDOMEN PELVIS WO CONTRAST   Final Result   Bilateral nonobstructive renal stones. Partially atrophic left kidney. Right renal cortical thinning and scarring, unchanged since the prior   examination. No evidence of acute intra-abdominal pathology. Intra-abdominal visceral   injury cannot be excluded without intravenous contrast.          ------------------------- NURSING NOTES AND VITALS REVIEWED ---------------------------   The nursing notes within the ED encounter and vital signs as below have been reviewed. BP (!) 158/102   Pulse 71   Temp 98.5 °F (36.9 °C) (Oral)   Resp 20   Ht 5' 1\" (1.549 m)   Wt 207 lb (93.9 kg)   LMP 03/27/2017   SpO2 97%   BMI 39.11 kg/m²   Oxygen Saturation Interpretation: Normal      ---------------------------------------------------PHYSICAL EXAM--------------------------------------      Constitutional/General: Alert and oriented x3, well appearing, non toxic in NAD  Head: NC/AT  Eyes: PERRL, EOMI  Mouth: Oropharynx clear, handling secretions, no trismus  Neck: Supple, full ROM, no meningeal signs  Pulmonary: Lungs clear to auscultation bilaterally, no wheezes, rales, or rhonchi. Not in respiratory distress  Cardiovascular:  Regular rate and rhythm, no murmurs, gallops, or rubs. 2+ distal pulses  Abdomen: Soft,  non distended, left CVA tenderness  Extremities: Moves all extremities x 4.  Warm and well perfused  Skin: warm and dry without rash  Neurologic: GCS 15,  Psych: Normal Affect      ------------------------------ ED COURSE/MEDICAL DECISION MAKING----------------------  Medications   potassium chloride (KLOR-CON M) extended release tablet 40 mEq (has no administration in time range)   HYDROcodone-acetaminophen (NORCO) 5-325 MG per tablet 1 tablet (has no administration in time range)   morphine injection 4 mg (has no administration in time range) 0.9 % sodium chloride bolus (1,000 mLs Intravenous New Bag 12/12/20 2157)   ketorolac (TORADOL) injection 15 mg (30 mg Intravenous Given 12/12/20 2157)   ondansetron (ZOFRAN) injection 4 mg (4 mg Intravenous Given 12/12/20 2157)         Medical Decision Making:    Time: 3667  Re-evaluation. Patients symptoms are improving  Repeat physical examination is improved, symptoms are mildly improved and her pain is mildly improved. Additional pain medication will be provided. CAT scan x-ray showing a nonobstructing kidney stone bilaterally. No evidence of infection. Plan will be for discharge to home with encouragement to increase oral fluids pain medication and follow-up with urology. Counseling: The emergency provider has spoken with the patient and discussed todays results, in addition to providing specific details for the plan of care and counseling regarding the diagnosis and prognosis.   Questions are answered at this time and they are agreeable with the plan.      --------------------------------- IMPRESSION AND DISPOSITION ---------------------------------    IMPRESSION  1. Kidney stone        DISPOSITION  Disposition: Discharge to home  Patient condition is good                  YESENIA Davis - CNP  12/12/20 [id number]

## 2020-12-23 ENCOUNTER — OFFICE VISIT (OUTPATIENT)
Dept: PHYSICAL MEDICINE AND REHAB | Age: 40
End: 2020-12-23
Payer: MEDICAID

## 2020-12-23 VITALS — WEIGHT: 220 LBS | BODY MASS INDEX: 41.54 KG/M2 | HEIGHT: 61 IN

## 2020-12-23 PROCEDURE — 95886 MUSC TEST DONE W/N TEST COMP: CPT | Performed by: PHYSICAL MEDICINE & REHABILITATION

## 2020-12-23 PROCEDURE — 95912 NRV CNDJ TEST 11-12 STUDIES: CPT | Performed by: PHYSICAL MEDICINE & REHABILITATION

## 2020-12-23 NOTE — PROGRESS NOTES
[address] [facility]  Electrodiagnostic Laboratory  *Accredited by the [address] [place] [address] with exemplary status  [address] [facility]  Phone: [phone]  Fax: [phone]    Referring Provider: YESENIA Chao - *  Primary Care Physician: YESENIA Burgos NP  Patient Name: Johana Herbert  Patient YOB: 1980  Gender: female  BMI: There is no height or weight on file to calculate BMI. Last menstrual period 03/27/2017, not currently breastfeeding. 12/23/2020    Description of clinical problem:   Chief Complaint   Patient presents with    Extremity Pain     in left wrist may radiate up the arm. pain described as sharp.  pain rated a 7. symptoms for about 2 months with no acute injury.  Numbness     index, middle and ring finger of left hand. at night whole hand goes numb and radiates up into the forearm.  Extremity Weakness     weakened  strength in left hand. Sensory NCS      Nerve / Sites Rec. Site Peak Lat PP Amp Segments Distance Velocity Temp. ms µV  cm m/s °C   L Median - Digit II (Antidromic)      Palm Dig II 2.19 83.7 Palm - Dig II 7 46 30.7      Wrist Dig II 3.75 65.2 Wrist - Dig II 14 47 30.7   L Ulnar - Digit V (Antidromic)      Wrist Dig V 3.85 51.3 Wrist - Dig V 14 44 30.7   L Radial - Anatomical snuff box (Forearm)      Forearm Wrist 2.45 29.2 Forearm - Wrist 10 56 30.8       Combined Sensory Index      Nerve / Sites Rec. Site Peak Lat NP Amp PP Amp Segments Dist. Peak Diff Temp.      ms µV µV  cm ms °C   L Median - CSI      Median Thumb 3.39 30.2 43.5 Median - Radial 10 0.10 31.6      Radial Thumb 3.28 13.8 13.6 Median - Ulnar 14 0.16 31.6      Median Ring 3.75 21.8 28.6 Median palm - Ulnar palm 8 0.05 31.4      Ulnar Ring 3.59 32.7 17.0          Median palm Wrist 2.14 67.7 71.1          Ulnar palm Wrist 2.08 40.5 29.0          CSI     CSI  0.31        Motor NCS Nerve / Sites Muscle Onset Amplitude Segments Distance Velocity Temp.     ms mV  cm m/s °C   L Median - APB      Palm APB 2.14 12.1 Palm - APB   30.8      Wrist APB 3.70 9.7 Wrist - Palm 8 51 30.8      Elbow APB 7.45 9.2 Elbow - Wrist 19 51 22.5   L Ulnar - ADM      Wrist ADM 3.28 8.6 Wrist - ADM 8  30.8      B. Elbow ADM 5.89 7.8 B. Elbow - Wrist 17 65 30.7      A. Elbow ADM 7.40 7.6 A. Elbow - B. Elbow 10 66 30.7       F  Wave      Nerve Fmin % F    ms %   L Median - APB 26.51 100   L Ulnar - ADM 29.58 100       EMG      EMG Summary Table     Spontaneous MUAP Recruitment   Muscle Nerve Roots IA Fib PSW Fasc Amp Dur. PPP Pattern   L. Biceps brachii Musculocutaneous C5-C6 N None None None N N N N   L. Triceps brachii Radial C6-C8 N None None None N N N N   L. Pronator teres Median C6-C7 N None None None N N N N   L. First dorsal interosseous Ulnar C8-T1 N None None None N N N N   L. Abductor pollicis brevis Median F8-H1 N None None None N N N N            Study Limitations:  none    Summary of Findings:   Nerve conduction studies:   · All nerve conduction studies listed in the table above were normal in latency, amplitude and conduction velocity. Needle EMG:   · Needle EMG was performed using a concentric needle. ? All muscles tested, as listed in the table above demonstrated normal amplitude, duration, phases and recruitment and no active denervation signs were seen. Diagnostic Interpretation: This study was normal.     Previous Study: none to compare of left upper extremity. Follow up EMG is recommended if clinically warranted. Technologist: Ronen Torres  Physician:    Macario Smith D.O., P.T.   Board Certified Physical Medicine and Rehabilitation  Board Certified Electrodiagnostic Medicine Nerve conduction studies and electromyography were performed according to our laboratory policies and procedures which can be provided upon request. All abnormal values are identified in the table.  Laboratory normal values can also be provided upon request.       Cc: YESENIA Ortiz - *  YESENIA Obando - NP

## 2020-12-29 DIAGNOSIS — R20.0 NUMBNESS: ICD-10-CM

## 2021-01-27 ENCOUNTER — TELEPHONE (OUTPATIENT)
Dept: ORTHOPEDIC SURGERY | Age: 41
End: 2021-01-27

## 2021-01-27 DIAGNOSIS — M25.532 LEFT WRIST PAIN: Primary | ICD-10-CM

## 2021-02-01 ENCOUNTER — OFFICE VISIT (OUTPATIENT)
Dept: ORTHOPEDIC SURGERY | Age: 41
End: 2021-02-01
Payer: MEDICAID

## 2021-02-01 VITALS — TEMPERATURE: 97.3 F | HEIGHT: 61 IN | BODY MASS INDEX: 39.65 KG/M2 | WEIGHT: 210 LBS | RESPIRATION RATE: 20 BRPM

## 2021-02-01 DIAGNOSIS — M25.532 LEFT WRIST PAIN: Primary | ICD-10-CM

## 2021-02-01 PROCEDURE — G8417 CALC BMI ABV UP PARAM F/U: HCPCS | Performed by: ORTHOPAEDIC SURGERY

## 2021-02-01 PROCEDURE — G8427 DOCREV CUR MEDS BY ELIG CLIN: HCPCS | Performed by: ORTHOPAEDIC SURGERY

## 2021-02-01 PROCEDURE — 99213 OFFICE O/P EST LOW 20 MIN: CPT | Performed by: ORTHOPAEDIC SURGERY

## 2021-02-01 PROCEDURE — G8484 FLU IMMUNIZE NO ADMIN: HCPCS | Performed by: ORTHOPAEDIC SURGERY

## 2021-02-01 PROCEDURE — 4004F PT TOBACCO SCREEN RCVD TLK: CPT | Performed by: ORTHOPAEDIC SURGERY

## 2021-02-01 RX ORDER — FLUOXETINE HYDROCHLORIDE 20 MG/1
20 CAPSULE ORAL DAILY
COMMUNITY

## 2021-02-01 NOTE — PROGRESS NOTES
Department of Orthopedic Surgery/Hand Surgery  Resident Office Note        CHIEF COMPLAINT:    Chief Complaint   Patient presents with    Wrist Pain     left wrist pain since October, EMG done, painfull numbness up to elbow. History Obtained From:  patient    HISTORY OF PRESENT ILLNESS:                Young Elias is a 41y.o. year old  female who presents for evaluation. Left wrist pain and left numbness and tingling. Patient states that she has been waking up in the melanite with left hand pain and numbness. Patient states this is been going on for last couple years and has progressively gotten worse. Patient's not tried any bracing or injections in the past.  Patient does have a history of previous right carpal tunnel and right ECU tendon surgery. Patient states that couple days ago she felt like she needed to crack her wrist and began to rotate her left wrist so she felt a pop. When she began having chronic left-sided ulnar pain and snapping. Patient is right-hand dominant. the patients occupation is a .     Past Medical History:    Past Medical History:   Diagnosis Date    Anxiety     Depression     Diabetes mellitus ([facility] [address]     Herpes genitalia     no recent outbreak as of 5-21-20     Kidney stones     for OR 5-22-20     Lower back pain     ruptured discs, lumbar    Seasonal allergies      Past Surgical History:    Past Surgical History:   Procedure Laterality Date    CARPAL TUNNEL RELEASE Right 9/27/2019    RIGHT CARPAL TUNNEL RELEASE ENDOSCOPIC performed [facility] Santana [facility] at [facility]      x    COLONOSCOPY      CYSTOSCOPY      DILATION AND CURETTAGE OF UTERUS      ENDOMETRIAL ABLATION      HYSTERECTOMY      HYSTEROSCOPY  01/18/2017    D & C    LEEP      LITHOTRIPSY      LITHOTRIPSY Right 5/22/2020    CYSTOSCOPY RETROGRADE PYELOGRAM LASER LITHOTRIPSY, URETEROSCOPY, RIGHT STRING STENT INSERTION performed by Jarvis Lopez DO at [facility] OTHER SURGICAL HISTORY Bilateral 03/01/2017    Total Hysterectomy Bilateral Salpingectomy     TUBAL LIGATION      WRIST ARTHROSCOPY Right 9/27/2019    RIGHT WRIST ARTHROSCOPY, OPEN TFCC REPAIR WITH DISTAL ULNAR RADIAL JOINT, ECU TENDON STABILIZATION (Luciana Limb ANCHORS) performed by Juanita Cooney MD at [facility] OR     Current Medications:   No current facility-administered medications for this visit. Allergies:     Allergies   Allergen Reactions    Tape Vonna Vani Tape] Hives     And steri strips        Social History:   Social History     Socioeconomic History    Marital status: Single     Spouse name: Not on file    Number of children: Not on file    Years of education: Not on file    Highest education level: Not on file   Occupational History    Not on file   Social Needs    Financial resource strain: Not on file    Food insecurity     Worry: Not on file     Inability: Not on file    Transportation needs     Medical: Not on file     Non-medical: Not on file   Tobacco Use    Smoking status: Current Every Day Smoker     Packs/day: 0.50     Years: 25.00     Pack years: 12.50     Types: Cigarettes    Smokeless tobacco: Never Used   Substance and Sexual Activity    Alcohol use: No     Alcohol/week: 1.0 standard drinks     Types: 1 Shots of liquor per week     Comment: rarely    Drug use: No    Sexual activity: Never   Lifestyle    Physical activity     Days per week: Not on file     Minutes per session: Not on file    Stress: Not on file   Relationships    Social connections     Talks on phone: Not on file     Gets together: Not on file     Attends Restoration service: Not on file     Active member of club or organization: Not on file     Attends meetings of clubs or organizations: Not on file     Relationship status: Not on file    Intimate partner violence     Fear of current or ex partner: Not on file     Emotionally abused: Not on file     Physically abused: Not on file     Forced sexual activity: Not on file   Other Topics Concern    Not on file   Social History Narrative    Not on file     Family History:   Family History   Problem Relation Age of Onset    Heart Disease Mother     Other Mother         cirrhosis liver, non alcoholic    Diabetes Father     Kidney Disease Father         dialysis    Stroke Father     High Blood Pressure Father     Heart Disease Father        REVIEW OF SYSTEMS:    CONSTITUTIONAL:  negative for  fevers, chills, sweats and fatigue  RESPIRATORY:  negative for  dry cough, cough with sputum, dyspnea, wheezing and chest pain  CARDIOVASCULAR:  negative for  chest pain, dyspnea, palpitations, syncope  GASTROINTESTINAL:  negative for nausea, vomiting, change in bowel habits, diarrhea, constipation and abdominal pain  BEHAVIOR/PSYCH:  negative for poor appetite, increased appetite, decreased sleep and poor concentration  MUSCULOSKELETAL:  positive for  pain      PHYSICAL EXAM:    VITALS:  Temp 97.3 °F (36.3 °C) (Infrared)   Resp 20   Ht 5' 1\" (1.549 m)   Wt 210 lb (95.3 kg)   LMP 03/27/2017   BMI 39.68 kg/m²     CONSTITUTIONAL:  awake, alert, cooperative, no apparent distress, and appears stated age    LUNGS:  No increased work of breathing, good air exchange, clear to auscultation bilaterally, no crackles or wheezing    CARDIOVASCULAR:  Normal apical impulse, regular rate and rhythm, normal S1 and S2, no S3 or S4, and no murmur noted    ABDOMEN: Soft, Non-tender to palpation     Cervical Exam:    On physical exam, Gilson Parker is well-developed, well-nourished, oriented to person, place and time. her gait is normal.  On evaluation of her cervical spine, she has full range of motion of the cervical spine without pain. There is no cervical tenderness to palpation. Left upper extremity exam:    The skin overlying the hand is  intact. There is not evidence of scar, lesion, laceration, or abrasion.   The motion in the small joints of the hand are intact with cubital tunnel decompression. Prior to proceeding with surgical invention plan for an MRI of the left wrist to confirm clinical findings consistent with TFCC injury, ECU instability, and DRUJ symptomatology. I did discuss the significance of her clinical examination and symptoms of carpal tunnel and cubital tunnel despite normal conduction velocities for carpal tunnel and cubital tunnel. She voiced understanding. Given the good results on the right she like proceed with similar surgery on the left as she relates her symptoms are very similar. . I concur with the findings and plan as documented.     Santana Little MD  2/1/2021

## 2021-02-09 ENCOUNTER — HOSPITAL ENCOUNTER (OUTPATIENT)
Dept: MRI IMAGING | Age: 41
Discharge: HOME | End: 2021-02-11
Payer: MEDICAID

## 2021-02-09 DIAGNOSIS — M25.532 LEFT WRIST PAIN: ICD-10-CM

## 2021-02-09 PROCEDURE — 73221 MRI JOINT UPR EXTREM W/O DYE: CPT

## 2021-02-15 ENCOUNTER — OFFICE VISIT (OUTPATIENT)
Dept: ORTHOPEDIC SURGERY | Age: 41
End: 2021-02-15
Payer: MEDICAID

## 2021-02-15 VITALS — TEMPERATURE: 97.1 F | BODY MASS INDEX: 39.65 KG/M2 | WEIGHT: 210 LBS | HEIGHT: 61 IN | RESPIRATION RATE: 18 BRPM

## 2021-02-15 DIAGNOSIS — M77.8 LEFT WRIST TENDINITIS: ICD-10-CM

## 2021-02-15 DIAGNOSIS — G56.22 CUBITAL TUNNEL SYNDROME ON LEFT: ICD-10-CM

## 2021-02-15 DIAGNOSIS — G56.02 LEFT CARPAL TUNNEL SYNDROME: ICD-10-CM

## 2021-02-15 DIAGNOSIS — S69.81XA TFCC (TRIANGULAR FIBROCARTILAGE COMPLEX) INJURY, RIGHT, INITIAL ENCOUNTER: Primary | ICD-10-CM

## 2021-02-15 PROCEDURE — G8427 DOCREV CUR MEDS BY ELIG CLIN: HCPCS | Performed by: ORTHOPAEDIC SURGERY

## 2021-02-15 PROCEDURE — G8417 CALC BMI ABV UP PARAM F/U: HCPCS | Performed by: ORTHOPAEDIC SURGERY

## 2021-02-15 PROCEDURE — 4004F PT TOBACCO SCREEN RCVD TLK: CPT | Performed by: ORTHOPAEDIC SURGERY

## 2021-02-15 PROCEDURE — 99214 OFFICE O/P EST MOD 30 MIN: CPT | Performed by: ORTHOPAEDIC SURGERY

## 2021-02-15 PROCEDURE — G8484 FLU IMMUNIZE NO ADMIN: HCPCS | Performed by: ORTHOPAEDIC SURGERY

## 2021-02-15 NOTE — PROGRESS NOTES
Department of Orthopedic Surgery/Hand Surgery  Resident Office Note        CHIEF COMPLAINT:    Chief Complaint   Patient presents with    Wrist Pain     left wrist pain and numbness        History Obtained From:  patient    HISTORY OF PRESENT ILLNESS:                Del Jha is a 41y.o. year old  female who presents for evaluation. Left wrist pain and left numbness and tingling. Patient states that she has been waking up in the melanite with left hand pain and numbness. Patient states this is been going on for last couple years and has progressively gotten worse. Patient's not tried any bracing or injections in the past.  Patient does have a history of previous right carpal tunnel and right ECU tendon surgery. Patient states that couple days ago she felt like she needed to crack her wrist and began to rotate her left wrist so she felt a pop. When she began having chronic left-sided ulnar pain and snapping. Patient is right-hand dominant. the patients occupation is a . Patient here for MRI follow-up and review. Patient states that her numbness and tingling has progressively gotten worse over the last couple weeks since last seen in office.   She says that she still having snapping and pain over the lateral aspect of her wrist.    Past Medical History:    Past Medical History:   Diagnosis Date    Anxiety     Depression     Diabetes mellitus ([address]     Herpes genitalia     no recent outbreak as of 5-21-20     Kidney stones     for OR 5-22-20     Lower back pain     ruptured discs, lumbar    Seasonal allergies      Past Surgical History:    Past Surgical History:   Procedure Laterality Date    CARPAL TUNNEL RELEASE Right 9/27/2019    RIGHT CARPAL TUNNEL RELEASE ENDOSCOPIC performed by Sergey You MD at [address] [place] [address],[address]      x3    COLONOSCOPY      CYSTOSCOPY      DILATION AND CURETTAGE OF UTERUS      ENDOMETRIAL ABLATION      HYSTERECTOMY      HYSTEROSCOPY 01/18/2017    D & C    LEEP      LITHOTRIPSY      LITHOTRIPSY Right 5/22/2020    CYSTOSCOPY RETROGRADE PYELOGRAM LASER LITHOTRIPSY, URETEROSCOPY, RIGHT STRING STENT INSERTION performed by Kimo Lopez DO at [facility] OTHER SURGICAL HISTORY Bilateral 03/01/2017    Total Hysterectomy Bilateral Salpingectomy     TUBAL LIGATION      WRIST ARTHROSCOPY Right 9/27/2019    RIGHT WRIST ARTHROSCOPY, OPEN TFCC REPAIR WITH DISTAL ULNAR RADIAL JOINT, ECU TENDON STABILIZATION (Lalita Bucker ANCHORS) performed by Rigo Lucero MD at [facility] OR     Current Medications:   No current facility-administered medications for this visit. Allergies:     Allergies   Allergen Reactions    Tape Glenice Jassi Tape] Hives     And steri strips        Social History:   Social History     Socioeconomic History    Marital status: Single     Spouse name: Not on file    Number of children: Not on file    Years of education: Not on file    Highest education level: Not on file   Occupational History    Not on file   Social Needs    Financial resource strain: Not on file    Food insecurity     Worry: Not on file     Inability: Not on file    Transportation needs     Medical: Not on file     Non-medical: Not on file   Tobacco Use    Smoking status: Current Every Day Smoker     Packs/day: 0.50     Years: 25.00     Pack years: 12.50     Types: Cigarettes    Smokeless tobacco: Never Used   Substance and Sexual Activity    Alcohol use: No     Alcohol/week: 1.0 standard drinks     Types: 1 Shots of liquor per week     Comment: rarely    Drug use: No    Sexual activity: Never   Lifestyle    Physical activity     Days per week: Not on file     Minutes per session: Not on file    Stress: Not on file   Relationships    Social connections     Talks on phone: Not on file     Gets together: Not on file     Attends Mu-ism service: Not on file     Active member of club or organization: Not on file     Attends meetings of clubs or organizations: Not on file     Relationship status: Not on file    Intimate partner violence     Fear of current or ex partner: Not on file     Emotionally abused: Not on file     Physically abused: Not on file     Forced sexual activity: Not on file   Other Topics Concern    Not on file   Social History Narrative    Not on file     Family History:   Family History   Problem Relation Age of Onset    Heart Disease Mother     Other Mother         cirrhosis liver, non alcoholic    Diabetes Father     Kidney Disease Father         dialysis    Stroke Father     High Blood Pressure Father     Heart Disease Father        REVIEW OF SYSTEMS:    CONSTITUTIONAL:  negative for  fevers, chills, sweats and fatigue  RESPIRATORY:  negative for  dry cough, cough with sputum, dyspnea, wheezing and chest pain  CARDIOVASCULAR:  negative for  chest pain, dyspnea, palpitations, syncope  GASTROINTESTINAL:  negative for nausea, vomiting, change in bowel habits, diarrhea, constipation and abdominal pain  BEHAVIOR/PSYCH:  negative for poor appetite, increased appetite, decreased sleep and poor concentration  MUSCULOSKELETAL:  positive for  pain      PHYSICAL EXAM:    VITALS:  Temp 97.1 °F (36.2 °C) (Infrared)   Resp 18   Ht 5' 1\" (1.549 m)   Wt 210 lb (95.3 kg)   LMP 03/27/2017   BMI 39.68 kg/m²     CONSTITUTIONAL:  awake, alert, cooperative, no apparent distress, and appears stated age    LUNGS:  No increased work of breathing, good air exchange, clear to auscultation bilaterally, no crackles or wheezing    CARDIOVASCULAR:  Normal apical impulse, regular rate and rhythm, normal S1 and S2, no S3 or S4, and no murmur noted    ABDOMEN: Soft, Non-tender to palpation     Cervical Exam:    On physical exam, Neelam Arevalo is well-developed, well-nourished, oriented to person, place and time. her gait is normal.  On evaluation of her cervical spine, she has full range of motion of the cervical spine without pain.   There is no cervical tenderness to palpation. Left upper extremity exam:    The skin overlying the hand is  intact. There is not evidence of scar, lesion, laceration, or abrasion. The motion in the small joints of the hand are intact with no stiffness or deformity. There is no masses or adenopathy in bilateral upper extremities. Radial pulses are 2+ and symmetric bilaterally. Capillary refill is intact and < 2 seconds. Motor strength is 5/5 with flexion and extension of the small finger joints. The ROM to fingers are normal.  Negative left grind, negative Finkelstein's, negative tenderness over the A1 pulleys, APB 4/5 , positive thenar atrophy, positive Wartenberg sign, negative Anil sign, positive Tinel's at the wrist, positive Tinel's at the elbow, positive flexion elbow test.  Positive ECU synergistic test, positive snapping of the ECU with wrist in supination with ulnar to radial deviation of the wrist.  Negative tenderness over the fovea. Positive laxity to the DRUJ and supination and pronation        DATA:    MRI left wrist  Demonstrating chronic TFCC redundancy without laxity on MRI, mild inflammation around the ECU tendon at the level of the wrist.     IMPRESSION/RECOMMENDATIONS:      Impression:  Left carpal tunnel syndrome  Left cubital tunnel syndrome  Left ECU tendon tendinitis  Left DRUJ instability    Discussed diagnosis and x-ray findings with patient today. MRI reviewed with patient today. Discussed possible treatment options and surgical options for left carpal tunnel and left cubital tunnel syndrome. Discussed surgical options with patient today. Patient would like to proceed with surgery. Discussed risk and benefits of the procedure. Would like to proceed with left cubital tunnel release with possible transposition, left carpal tunnel release, left wrist arthroscopy, left open TFCC repair with tendon stabilization. Discussed postoperative protocols with patient today.     Risks, benefits, and alternatives were discussed with the patient and their family. Risks include but are not limited to infection, blood loss, damage to neurovascular and musculoskeletal structures, malunion, nonunion, symptomatic hardware, need for further surgery, possible arthritis despite surgical stabilization, stiffness, and catastrophic anesthesia complications. They understand and wish to proceed. I have seen and evaluated the patient and agree with the above assessment and plan on today's visit. I have performed the key components of the history and physical examination with significant findings of persistent and recalcitrant left carpal tunnel syndrome and cubital tunnel syndrome with ECU tendon instability and DRUJ instability. Given her good results on the right she like to proceed with similar surgery on the left. Plan for left carpal tunnel release, left ulnar nerve decompression at elbow possible transposition as needed, left wrist arthroscopy with debridement versus repair of the TFCC and open DRUJ stabilization with stabilization of the ECU tendon. 3 to 6 months of recovery is anticipated and discussed. All questions answered. I concur with the findings and plan as documented.     Esther Kirkland MD  2/15/2021

## 2021-02-20 ENCOUNTER — APPOINTMENT (OUTPATIENT)
Dept: CT IMAGING | Age: 41
End: 2021-02-20
Payer: MEDICAID

## 2021-02-20 ENCOUNTER — HOSPITAL ENCOUNTER (EMERGENCY)
Age: 41
Discharge: HOME | End: 2021-02-20
Payer: MEDICAID

## 2021-02-20 VITALS
OXYGEN SATURATION: 96 % | DIASTOLIC BLOOD PRESSURE: 102 MMHG | RESPIRATION RATE: 20 BRPM | BODY MASS INDEX: 39.65 KG/M2 | WEIGHT: 210 LBS | TEMPERATURE: 98 F | HEART RATE: 92 BPM | SYSTOLIC BLOOD PRESSURE: 142 MMHG | HEIGHT: 61 IN

## 2021-02-20 DIAGNOSIS — R10.9 FLANK PAIN: Primary | ICD-10-CM

## 2021-02-20 LAB
ALBUMIN SERPL-MCNC: 4.3 G/DL (ref 3.5–5.2)
ALP BLD-CCNC: 94 U/L (ref 35–104)
ALT SERPL-CCNC: 13 U/L (ref 0–32)
ANION GAP SERPL CALCULATED.3IONS-SCNC: 9 MMOL/L (ref 7–16)
AST SERPL-CCNC: 12 U/L (ref 0–31)
BASOPHILS ABSOLUTE: 0.07 E9/L (ref 0–0.2)
BASOPHILS RELATIVE PERCENT: 1 % (ref 0–2)
BILIRUB SERPL-MCNC: <0.2 MG/DL (ref 0–1.2)
BILIRUBIN URINE: NEGATIVE
BLOOD, URINE: NEGATIVE
BUN BLDV-MCNC: 14 MG/DL (ref 6–20)
CALCIUM SERPL-MCNC: 9.5 MG/DL (ref 8.6–10.2)
CHLORIDE BLD-SCNC: 105 MMOL/L (ref 98–107)
CLARITY: CLEAR
CO2: 24 MMOL/L (ref 22–29)
COLOR: YELLOW
CREAT SERPL-MCNC: 1.2 MG/DL (ref 0.5–1)
EOSINOPHILS ABSOLUTE: 0.08 E9/L (ref 0.05–0.5)
EOSINOPHILS RELATIVE PERCENT: 1.1 % (ref 0–6)
GFR AFRICAN AMERICAN: 59
GFR NON-AFRICAN AMERICAN: 49 ML/MIN/1.73
GLUCOSE BLD-MCNC: 90 MG/DL (ref 74–99)
GLUCOSE URINE: NEGATIVE MG/DL
HCT VFR BLD CALC: 44.1 % (ref 34–48)
HEMOGLOBIN: 14.4 G/DL (ref 11.5–15.5)
IMMATURE GRANULOCYTES #: 0.02 E9/L
IMMATURE GRANULOCYTES %: 0.3 % (ref 0–5)
KETONES, URINE: ABNORMAL MG/DL
LACTIC ACID: 1.2 MMOL/L (ref 0.5–2.2)
LEUKOCYTE ESTERASE, URINE: NEGATIVE
LIPASE: 47 U/L (ref 13–60)
LYMPHOCYTES ABSOLUTE: 2.55 E9/L (ref 1.5–4)
LYMPHOCYTES RELATIVE PERCENT: 35.6 % (ref 20–42)
MCH RBC QN AUTO: 30 PG (ref 26–35)
MCHC RBC AUTO-ENTMCNC: 32.7 % (ref 32–34.5)
MCV RBC AUTO: 91.9 FL (ref 80–99.9)
MONOCYTES ABSOLUTE: 0.54 E9/L (ref 0.1–0.95)
MONOCYTES RELATIVE PERCENT: 7.5 % (ref 2–12)
NEUTROPHILS ABSOLUTE: 3.91 E9/L (ref 1.8–7.3)
NEUTROPHILS RELATIVE PERCENT: 54.5 % (ref 43–80)
NITRITE, URINE: NEGATIVE
PDW BLD-RTO: 12.8 FL (ref 11.5–15)
PH UA: 6 (ref 5–9)
PLATELET # BLD: 341 E9/L (ref 130–450)
PMV BLD AUTO: 10.7 FL (ref 7–12)
POTASSIUM REFLEX MAGNESIUM: 4.1 MMOL/L (ref 3.5–5)
PROTEIN UA: NEGATIVE MG/DL
RBC # BLD: 4.8 E12/L (ref 3.5–5.5)
SODIUM BLD-SCNC: 138 MMOL/L (ref 132–146)
SPECIFIC GRAVITY UA: 1.02 (ref 1–1.03)
TOTAL PROTEIN: 7.2 G/DL (ref 6.4–8.3)
UROBILINOGEN, URINE: 0.2 E.U./DL
WBC # BLD: 7.2 E9/L (ref 4.5–11.5)

## 2021-02-20 PROCEDURE — 96375 TX/PRO/DX INJ NEW DRUG ADDON: CPT

## 2021-02-20 PROCEDURE — 96374 THER/PROPH/DIAG INJ IV PUSH: CPT

## 2021-02-20 PROCEDURE — 6360000002 HC RX W HCPCS: Performed by: PHYSICIAN ASSISTANT

## 2021-02-20 PROCEDURE — 81003 URINALYSIS AUTO W/O SCOPE: CPT

## 2021-02-20 PROCEDURE — 87088 URINE BACTERIA CULTURE: CPT

## 2021-02-20 PROCEDURE — 83605 ASSAY OF LACTIC ACID: CPT

## 2021-02-20 PROCEDURE — 80053 COMPREHEN METABOLIC PANEL: CPT

## 2021-02-20 PROCEDURE — 85025 COMPLETE CBC W/AUTO DIFF WBC: CPT

## 2021-02-20 PROCEDURE — 83690 ASSAY OF LIPASE: CPT

## 2021-02-20 PROCEDURE — 99283 EMERGENCY DEPT VISIT LOW MDM: CPT

## 2021-02-20 PROCEDURE — 74176 CT ABD & PELVIS W/O CONTRAST: CPT

## 2021-02-20 RX ORDER — KETOROLAC TROMETHAMINE 30 MG/ML
30 INJECTION, SOLUTION INTRAMUSCULAR; INTRAVENOUS ONCE
Status: COMPLETED | OUTPATIENT
Start: 2021-02-20 | End: 2021-02-20

## 2021-02-20 RX ORDER — NAPROXEN 500 MG/1
500 TABLET ORAL 2 TIMES DAILY
Qty: 14 TABLET | Refills: 0 | Status: SHIPPED | OUTPATIENT
Start: 2021-02-20 | End: 2021-04-08 | Stop reason: ALTCHOICE

## 2021-02-20 RX ORDER — MORPHINE SULFATE 4 MG/ML
4 INJECTION, SOLUTION INTRAMUSCULAR; INTRAVENOUS ONCE
Status: COMPLETED | OUTPATIENT
Start: 2021-02-20 | End: 2021-02-20

## 2021-02-20 RX ORDER — LIDOCAINE 50 MG/G
1 PATCH TOPICAL EVERY 24 HOURS
Qty: 10 PATCH | Refills: 0 | Status: SHIPPED | OUTPATIENT
Start: 2021-02-20 | End: 2021-03-02

## 2021-02-20 RX ORDER — ONDANSETRON 2 MG/ML
4 INJECTION INTRAMUSCULAR; INTRAVENOUS ONCE
Status: COMPLETED | OUTPATIENT
Start: 2021-02-20 | End: 2021-02-20

## 2021-02-20 RX ORDER — ONDANSETRON 4 MG/1
4 TABLET, ORALLY DISINTEGRATING ORAL EVERY 8 HOURS PRN
Qty: 10 TABLET | Refills: 0 | Status: SHIPPED | OUTPATIENT
Start: 2021-02-20 | End: 2021-09-01

## 2021-02-20 RX ADMIN — KETOROLAC TROMETHAMINE 30 MG: 30 INJECTION, SOLUTION INTRAMUSCULAR at 17:47

## 2021-02-20 RX ADMIN — ONDANSETRON 4 MG: 2 INJECTION INTRAMUSCULAR; INTRAVENOUS at 17:47

## 2021-02-20 RX ADMIN — MORPHINE SULFATE 4 MG: 4 INJECTION, SOLUTION INTRAMUSCULAR; INTRAVENOUS at 17:47

## 2021-02-20 ASSESSMENT — PAIN SCALES - GENERAL: PAINLEVEL_OUTOF10: 9

## 2021-02-20 NOTE — ED NOTES
Radiology Procedure Waiver   Name: Anjum Fleming  : 1980  MRN: [id number]    Date:  21    Time: 5:31 PM EST    Benefits of immediately proceeding with Radiology exam(s) without pre-testing outweigh the risks or are not indicated as specified below and therefore the following is/are being waived:    [] Pregnancy test   [] Patients LMP on-time and regular.   [] Patient had Tubal Ligation or has other Contraception Device. [] Patient  is Menopausal or Premenarcheal.    [x] Patient had Full or Partial Hysterectomy. [] Protocol for Iodine allergy    [] MRI Questionnaire     [] BUN/Creatinine   [] Patient age w/no hx of renal dysfunction. [] Patient on Dialysis. [] Recent Normal Labs.   Electronically signed by HEATHRE Starkey on 21 at 5:31 PM Roberto Colroado  21 [id number]

## 2021-02-20 NOTE — ED PROVIDER NOTES
[facility]  HPI:  2/20/21, Time: 4:57 PM GAMA Hall is a 41 y.o. female presenting to the ED for abdominal pain, bilateral flank pain, beginning 2 Days  ago. The complaint has been persistent, severe in severity, and worsened by nothing. Patient comes in with complaint of bilateral flank pain abdominal pain. She states pain started a couple days ago. Pain was gradual in onset right flank greater than left. She states she has a long history of kidney stones pain feels similar. She had ureteral stent placement in May when she was admitted at [facility].  She was seen here in December with a right-sided kidney stone that she feels she never passed fully. She follows with Dr. Lobito Sr from urology. She has noted hematuria in the morning which improves during the day. Pain intermittent sharp pain with constant pressure. She has had nausea with one episode of vomiting. She has had ongoing diarrhea is presently being treated for colitis on Flagyl Cipro. She denies any present bloody stool or black tarry stool. Patient states she felt chilled yesterday. Review of Systems:   A complete review of systems was performed and pertinent positives and negatives are stated within HPI, all other systems reviewed and are negative.          --------------------------------------------- PAST HISTORY ---------------------------------------------  Past Medical History:  has a past medical history of Anxiety, Depression, Diabetes mellitus ([facility] [address] Herpes genitalia, Kidney stones, Lower back pain, and Seasonal allergies. Past Surgical History:  has a past surgical history that includes LEEP;  section; Endometrial ablation; Tubal ligation; Dilation and curettage of uterus; Lithotripsy; Cystocopy; Colonoscopy; hysteroscopy (2017); other surgical history (Bilateral, 2017); Wrist arthroscopy (Right, 2019); Carpal tunnel release (Right, 2019); Hysterectomy; and Lithotripsy (Right, 2020). Social History:  reports that she has been smoking cigarettes. She has a 12.50 pack-year smoking history. She has never used smokeless tobacco. She reports that she does not drink alcohol or use drugs. Family History: family history includes Diabetes in her father; Heart Disease in her father and mother; High Blood Pressure in her father; Kidney Disease in her father; Other in her mother; Stroke in her father. The patients home medications have been reviewed.     Allergies: Tape [adhesive tape]    -------------------------------------------------- RESULTS -------------------------------------------------  All laboratory and radiology results have been personally reviewed by myself   LABS:  Results for orders placed or performed during the hospital encounter of 21   CBC auto differential   Result Value Ref Range    WBC 7.2 4.5 - 11.5 E9/L    RBC 4.80 3.50 - 5.50 E12/L    Hemoglobin 14.4 11.5 - 15.5 g/dL    Hematocrit 44.1 34.0 - 48.0 %    MCV 91.9 80.0 - 99.9 fL    MCH 30.0 26.0 - 35.0 pg    MCHC 32.7 32.0 - 34.5 %    RDW 12.8 11.5 - 15.0 fL    Platelets 741 106 - 365 E9/L    MPV 10.7 7.0 - 12.0 fL    Neutrophils % 54.5 43.0 - 80.0 %    Immature Granulocytes % 0.3 0.0 - 5.0 %    Lymphocytes % 35.6 20.0 - 42.0 %    Monocytes % 7.5 2.0 - 12.0 %    Eosinophils % 1.1 0.0 - 6.0 %    Basophils % 1.0 0.0 - 2.0 %    Neutrophils Absolute 3.91 1.80 - 7.30 E9/L    Immature Granulocytes # 0.02 E9/L    Lymphocytes Absolute 2.55 1.50 - 4.00 E9/L    Monocytes Absolute 0.54 0.10 - 0.95 E9/L Eosinophils Absolute 0.08 0.05 - 0.50 E9/L    Basophils Absolute 0.07 0.00 - 0.20 E9/L   Comprehensive Metabolic Panel w/ Reflex to MG   Result Value Ref Range    Sodium 138 132 - 146 mmol/L    Potassium reflex Magnesium 4.1 3.5 - 5.0 mmol/L    Chloride 105 98 - 107 mmol/L    CO2 24 22 - 29 mmol/L    Anion Gap 9 7 - 16 mmol/L    Glucose 90 74 - 99 mg/dL    BUN 14 6 - 20 mg/dL    CREATININE 1.2 (H) 0.5 - 1.0 mg/dL    GFR Non-African American 49 >=60 mL/min/1.73    GFR African American 59     Calcium 9.5 8.6 - 10.2 mg/dL    Total Protein 7.2 6.4 - 8.3 g/dL    Albumin 4.3 3.5 - 5.2 g/dL    Total Bilirubin <0.2 0.0 - 1.2 mg/dL    Alkaline Phosphatase 94 35 - 104 U/L    ALT 13 0 - 32 U/L    AST 12 0 - 31 U/L   Lactic Acid, Plasma   Result Value Ref Range    Lactic Acid 1.2 0.5 - 2.2 mmol/L   Lipase   Result Value Ref Range    Lipase 47 13 - 60 U/L   Urinalysis   Result Value Ref Range    Color, UA Yellow Straw/Yellow    Clarity, UA Clear Clear    Glucose, Ur Negative Negative mg/dL    Bilirubin Urine Negative Negative    Ketones, Urine TRACE (A) Negative mg/dL    Specific Gravity, UA 1.025 1.005 - 1.030    Blood, Urine Negative Negative    pH, UA 6.0 5.0 - 9.0    Protein, UA Negative Negative mg/dL    Urobilinogen, Urine 0.2 <2.0 E.U./dL    Nitrite, Urine Negative Negative    Leukocyte Esterase, Urine Negative Negative       RADIOLOGY:  Interpreted by Radiologist.  CT ABDOMEN PELVIS WO CONTRAST Additional Contrast? None   Final Result   Bilateral nonobstructive renal stones, similar to the prior examination. Bilateral renal cortical thinning and scarring and partially atrophic left   kidney. No hydronephrosis or hydroureter. No ureteral calculi. No other evidence of acute intra-abdominal pathology. ------------------------- NURSING NOTES AND VITALS REVIEWED ---------------------------   The nursing notes within the ED encounter and vital signs as below have been reviewed. BP (!) 142/102   Pulse 92   Temp 98 °F (36.7 °C) (Oral)   Resp 20   Ht 5' 1\" (1.549 m)   Wt 210 lb (95.3 kg)   LMP 03/27/2017   SpO2 96%   BMI 39.68 kg/m²   Oxygen Saturation Interpretation: Normal      ---------------------------------------------------PHYSICAL EXAM--------------------------------------      Constitutional/General: Alert and oriented x3, well appearing, non toxic in NAD  Head: Normocephalic and atraumatic  Eyes: PERRL, EOMI  Mouth: Oropharynx clear, handling secretions, no trismus  Neck: Supple, full ROM,   Pulmonary: Lungs clear to auscultation bilaterally, no wheezes, rales, or rhonchi. Not in respiratory distress  Cardiovascular:  Regular rate and rhythm, no murmurs, gallops, or rubs. 2+ distal pulses  Abdomen: Soft mild tenderness right upper quadrant non distended, bilateral CVA tenderness guarding or rebound  Extremities: Moves all extremities x 4.  Warm and well perfused  Skin: warm and dry without rash  Neurologic: GCS 15,  Psych: Anxious affect      ------------------------------ ED COURSE/MEDICAL DECISION MAKING----------------------  Medications   ketorolac (TORADOL) injection 30 mg (30 mg Intravenous Given 2/20/21 1747)   ondansetron (ZOFRAN) injection 4 mg (4 mg Intravenous Given 2/20/21 1747)   morphine injection 4 mg (4 mg Intravenous Given 2/20/21 1747)         ED COURSE:       Medical Decision Making: Patient came in with complaint of bilateral back pain with hematuria noted. She has history of kidney stones CT was obtained there is stones within the kidney no ureteral lithiasis. No hydronephrosis or hydroureter. No acute intra-abdominal finding. No finding of colitis or diverticulitis. There was no finding of cholelithiasis normal liver enzymes. Urine no urinary tract infection no hematuria present. Patient has bilateral lower back pain there is no pain radiation to the legs no urinary or bowel incontinence no saddle paresthesias. Labs normal white cell count normal kidney function no elevated liver enzyme patient may have passed a stone when she noted the hematuria. Possible muscle strain. She was discharged with Naprosyn, Lidoderm patch and Zofran she is to follow-up with her primary care 1 to take days return to the ER any worsening symptoms    Counseling: The emergency provider has spoken with the patient and discussed todays results, in addition to providing specific details for the plan of care and counseling regarding the diagnosis and prognosis. Questions are answered at this time and they are agreeable with the plan.      --------------------------------- IMPRESSION AND DISPOSITION ---------------------------------    IMPRESSION  1. Flank pain        DISPOSITION  Disposition: Discharge to home  Patient condition is good      NOTE: This report was transcribed using voice recognition software.  Every effort was made to ensure accuracy; however, inadvertent computerized transcription errors may be present     [place], [place]  02/20/21 4947

## 2021-02-22 LAB — URINE CULTURE, ROUTINE: NORMAL

## 2021-03-02 ENCOUNTER — PREP FOR PROCEDURE (OUTPATIENT)
Dept: ORTHOPEDIC SURGERY | Age: 41
End: 2021-03-02

## 2021-03-02 RX ORDER — SODIUM CHLORIDE 0.9 % (FLUSH) 0.9 %
10 SYRINGE (ML) INJECTION PRN
Status: CANCELLED | OUTPATIENT
Start: 2021-03-02

## 2021-03-02 RX ORDER — SODIUM CHLORIDE 9 MG/ML
INJECTION, SOLUTION INTRAVENOUS CONTINUOUS
Status: CANCELLED | OUTPATIENT
Start: 2021-03-02

## 2021-03-02 RX ORDER — SODIUM CHLORIDE 0.9 % (FLUSH) 0.9 %
10 SYRINGE (ML) INJECTION EVERY 12 HOURS SCHEDULED
Status: CANCELLED | OUTPATIENT
Start: 2021-03-02

## 2021-04-08 ENCOUNTER — APPOINTMENT (OUTPATIENT)
Dept: GENERAL RADIOLOGY | Age: 41
End: 2021-04-08
Payer: MEDICAID

## 2021-04-08 ENCOUNTER — HOSPITAL ENCOUNTER (EMERGENCY)
Age: 41
Discharge: HOME | End: 2021-04-08
Attending: EMERGENCY MEDICINE
Payer: MEDICAID

## 2021-04-08 VITALS
WEIGHT: 213 LBS | BODY MASS INDEX: 40.22 KG/M2 | OXYGEN SATURATION: 97 % | SYSTOLIC BLOOD PRESSURE: 154 MMHG | RESPIRATION RATE: 24 BRPM | HEART RATE: 86 BPM | HEIGHT: 61 IN | TEMPERATURE: 97.1 F | DIASTOLIC BLOOD PRESSURE: 104 MMHG

## 2021-04-08 DIAGNOSIS — R07.81 RIB PAIN ON LEFT SIDE: Primary | ICD-10-CM

## 2021-04-08 PROCEDURE — 6360000002 HC RX W HCPCS: Performed by: STUDENT IN AN ORGANIZED HEALTH CARE EDUCATION/TRAINING PROGRAM

## 2021-04-08 PROCEDURE — 96372 THER/PROPH/DIAG INJ SC/IM: CPT

## 2021-04-08 PROCEDURE — 99282 EMERGENCY DEPT VISIT SF MDM: CPT

## 2021-04-08 PROCEDURE — 71101 X-RAY EXAM UNILAT RIBS/CHEST: CPT

## 2021-04-08 RX ORDER — METHOCARBAMOL 500 MG/1
500 TABLET, FILM COATED ORAL 4 TIMES DAILY
Qty: 40 TABLET | Refills: 0 | Status: SHIPPED | OUTPATIENT
Start: 2021-04-08 | End: 2021-04-13 | Stop reason: ALTCHOICE

## 2021-04-08 RX ORDER — KETOROLAC TROMETHAMINE 30 MG/ML
30 INJECTION, SOLUTION INTRAMUSCULAR; INTRAVENOUS ONCE
Status: COMPLETED | OUTPATIENT
Start: 2021-04-08 | End: 2021-04-08

## 2021-04-08 RX ORDER — ORPHENADRINE CITRATE 30 MG/ML
60 INJECTION INTRAMUSCULAR; INTRAVENOUS ONCE
Status: COMPLETED | OUTPATIENT
Start: 2021-04-08 | End: 2021-04-08

## 2021-04-08 RX ORDER — LIDOCAINE 50 MG/G
1 PATCH TOPICAL DAILY
Qty: 10 PATCH | Refills: 0 | Status: SHIPPED | OUTPATIENT
Start: 2021-04-08 | End: 2021-04-18

## 2021-04-08 RX ORDER — NAPROXEN 500 MG/1
500 TABLET ORAL 2 TIMES DAILY
Qty: 20 TABLET | Refills: 0 | Status: SHIPPED | OUTPATIENT
Start: 2021-04-08 | End: 2021-04-13 | Stop reason: ALTCHOICE

## 2021-04-08 RX ADMIN — KETOROLAC TROMETHAMINE 30 MG: 30 INJECTION, SOLUTION INTRAMUSCULAR; INTRAVENOUS at 21:41

## 2021-04-08 RX ADMIN — ORPHENADRINE CITRATE 60 MG: 60 INJECTION INTRAMUSCULAR; INTRAVENOUS at 21:41

## 2021-04-08 ASSESSMENT — ENCOUNTER SYMPTOMS
ABDOMINAL DISTENTION: 0
DIARRHEA: 0
VOMITING: 0
NAUSEA: 0
BACK PAIN: 0
SHORTNESS OF BREATH: 0
WHEEZING: 0
COUGH: 1

## 2021-04-08 ASSESSMENT — PAIN SCALES - GENERAL: PAINLEVEL_OUTOF10: 10
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
## 2021-04-12 ENCOUNTER — HOSPITAL ENCOUNTER (OUTPATIENT)
Age: 41
Discharge: HOME | End: 2021-04-14
Payer: MEDICAID

## 2021-04-12 DIAGNOSIS — Z01.818 PREOP TESTING: ICD-10-CM

## 2021-04-12 PROCEDURE — U0003 INFECTIOUS AGENT DETECTION BY NUCLEIC ACID (DNA OR RNA); SEVERE ACUTE RESPIRATORY SYNDROME CORONAVIRUS 2 (SARS-COV-2) (CORONAVIRUS DISEASE [COVID-19]), AMPLIFIED PROBE TECHNIQUE, MAKING USE OF HIGH THROUGHPUT TECHNOLOGIES AS DESCRIBED BY CMS-2020-01-R: HCPCS

## 2021-04-12 PROCEDURE — U0005 INFEC AGEN DETEC AMPLI PROBE: HCPCS

## 2021-04-13 LAB
SARS-COV-2: NOT DETECTED
SOURCE: NORMAL

## 2021-04-13 RX ORDER — CYCLOBENZAPRINE HCL 10 MG
10 TABLET ORAL NIGHTLY PRN
COMMUNITY

## 2021-04-13 RX ORDER — LORATADINE 10 MG/1
10 CAPSULE, LIQUID FILLED ORAL DAILY
COMMUNITY

## 2021-04-14 ENCOUNTER — HOSPITAL ENCOUNTER (OUTPATIENT)
Dept: PREADMISSION TESTING | Age: 41
Discharge: HOME | End: 2021-04-14
Payer: MEDICAID

## 2021-04-14 LAB
ANION GAP SERPL CALCULATED.3IONS-SCNC: 7 MMOL/L (ref 7–16)
BUN BLDV-MCNC: 9 MG/DL (ref 6–20)
CALCIUM SERPL-MCNC: 9.7 MG/DL (ref 8.6–10.2)
CHLORIDE BLD-SCNC: 105 MMOL/L (ref 98–107)
CO2: 29 MMOL/L (ref 22–29)
CREAT SERPL-MCNC: 1 MG/DL (ref 0.5–1)
GFR AFRICAN AMERICAN: >60
GFR NON-AFRICAN AMERICAN: >60 ML/MIN/1.73
GLUCOSE BLD-MCNC: 107 MG/DL (ref 74–99)
HCT VFR BLD CALC: 40.3 % (ref 34–48)
HEMOGLOBIN: 13.2 G/DL (ref 11.5–15.5)
MCH RBC QN AUTO: 29.5 PG (ref 26–35)
MCHC RBC AUTO-ENTMCNC: 32.8 % (ref 32–34.5)
MCV RBC AUTO: 90 FL (ref 80–99.9)
PDW BLD-RTO: 13.2 FL (ref 11.5–15)
PLATELET # BLD: 279 E9/L (ref 130–450)
PMV BLD AUTO: 10.3 FL (ref 7–12)
POTASSIUM SERPL-SCNC: 3.6 MMOL/L (ref 3.5–5)
RBC # BLD: 4.48 E12/L (ref 3.5–5.5)
SODIUM BLD-SCNC: 141 MMOL/L (ref 132–146)
WBC # BLD: 7.3 E9/L (ref 4.5–11.5)

## 2021-04-14 PROCEDURE — 85027 COMPLETE CBC AUTOMATED: CPT

## 2021-04-14 PROCEDURE — 93005 ELECTROCARDIOGRAM TRACING: CPT | Performed by: PHYSICIAN ASSISTANT

## 2021-04-14 PROCEDURE — 80048 BASIC METABOLIC PNL TOTAL CA: CPT

## 2021-04-14 PROCEDURE — 36415 COLL VENOUS BLD VENIPUNCTURE: CPT

## 2021-04-15 ENCOUNTER — ANESTHESIA EVENT (OUTPATIENT)
Dept: OPERATING ROOM | Age: 41
End: 2021-04-15
Payer: MEDICAID

## 2021-04-15 LAB
EKG ATRIAL RATE: 73 BPM
EKG P AXIS: 49 DEGREES
EKG P-R INTERVAL: 150 MS
EKG Q-T INTERVAL: 396 MS
EKG QRS DURATION: 90 MS
EKG QTC CALCULATION (BAZETT): 436 MS
EKG R AXIS: -10 DEGREES
EKG T AXIS: 54 DEGREES
EKG VENTRICULAR RATE: 73 BPM

## 2021-04-15 NOTE — ANESTHESIA PRE PROCEDURE
Department of Anesthesiology  Preprocedure Note       Name:  Cherylene Schneider   Age:  41 y.o.  :  1980                                          MRN:  [id number]         Date:  4/15/2021      Surgeon: Therese Mcintosh):  Charmaine Chinchilla MD    Procedure: Procedure(s):  LEFT ULNAR NERVE DECOMPRESSION AT THE ELBOW POSSIBLE TRANSPOSITION  LEFT CARPAL TUNNEL RELEASE  LEFT WRIST ARTHROSCOPY WITH OPEN TFCC REPAIR AND TENDON STABILIZATION   ++MINI MYTEK ANCHORS++    Medications prior to admission:   Prior to Admission medications    Medication Sig Start Date End Date Taking? Authorizing Provider   loratadine (CLARITIN) 10 MG capsule Take 10 mg by mouth daily   Yes Historical Provider, MD   cyclobenzaprine (FLEXERIL) 10 MG tablet Take 10 mg by mouth nightly as needed for Muscle spasms   Yes Historical Provider, MD   lidocaine (LIDODERM) 5 % Place 1 patch onto the skin daily for 10 days 12 hours on, 12 hours off. 21 Yes Noelle Wise DO   ondansetron (ZOFRAN ODT) 4 MG disintegrating tablet Take 1 tablet by mouth every 8 hours as needed for Nausea or Vomiting 21  Yes HEATHER Vazquez   FLUoxetine (PROZAC) 20 MG capsule Take 20 mg by mouth daily   Yes Historical Provider, MD   famotidine (PEPCID) 10 MG tablet Take 20 mg by mouth 2 times daily Instructed to take am of procedure 20  Yes Historical Provider, MD   metFORMIN, MOD, (GLUMETZA) 500 MG extended release tablet Take 500 mg by mouth daily (with breakfast)   Yes Historical Provider, MD   Cholecalciferol (VITAMIN D PO) Take by mouth daily    Yes Historical Provider, MD   Cyanocobalamin (VITAMIN B 12 PO) Take by mouth    Yes Historical Provider, MD   gabapentin (NEURONTIN) 600 MG tablet Take 800 mg by mouth 3 times daily. Fortino Perez Historical Provider, MD       Current medications:    No current facility-administered medications for this encounter.       Current Outpatient Medications   Medication Sig Dispense Refill    loratadine (CLARITIN) 10 MG 9.7 04/14/2021    BILITOT <0.2 02/20/2021    ALKPHOS 94 02/20/2021    AST 12 02/20/2021    ALT 13 02/20/2021       POC Tests: No results for input(s): POCGLU, POCNA, POCK, POCCL, POCBUN, POCHEMO, POCHCT in the last 72 hours. Coags: No results found for: PROTIME, INR, APTT    HCG (If Applicable):   Lab Results   Component Value Date    PREGTESTUR negative 05/03/2017        ABGs: No results found for: PHART, PO2ART, ZTD8IAF, IRF2JTH, BEART, [id number]     Type & Screen (If Applicable):  No results found for: LABABO, LABRH    Drug/Infectious Status (If Applicable):  No results found for: HIV, HEPCAB    COVID-19 Screening (If Applicable):   Lab Results   Component Value Date    COVID19 Not Detected 04/12/2021           Anesthesia Evaluation  Patient summary reviewed history of anesthetic complications:   Airway: Mallampati: III  TM distance: >3 FB   Neck ROM: full  Mouth opening: > = 3 FB Dental:    (+) upper dentures and edentulous      Pulmonary:Negative Pulmonary ROS breath sounds clear to auscultation  (+) current smoker                           Cardiovascular:Negative CV ROS          ECG reviewed  Rhythm: regular  Rate: normal                    Neuro/Psych:   (+) neuromuscular disease (Carpal tunnel syndrome):, psychiatric history:depression/anxiety             GI/Hepatic/Renal:   (+) renal disease: kidney stones,           Endo/Other:    (+) DiabetesType II DM, , .                 Abdominal:           Vascular: negative vascular ROS. Anesthesia Plan      general     ASA 3     (Pt agrees to GA--IV induction.)  Induction: intravenous. Anesthetic plan and risks discussed with patient. Plan discussed with CRNA.     Attending anesthesiologist reviewed and agrees with Pre Eval content            Doreen Ferrer MD   4/15/2021

## 2021-04-15 NOTE — PROGRESS NOTES
Dr. Dedra Alonzo reviewed EKG done 4/14/2021 and compared with old EKG done 2/20/201. No needs identified.  OK to proceed with surgery

## 2021-04-16 ENCOUNTER — ANESTHESIA (OUTPATIENT)
Dept: OPERATING ROOM | Age: 41
End: 2021-04-16
Payer: MEDICAID

## 2021-04-16 ENCOUNTER — HOSPITAL ENCOUNTER (OUTPATIENT)
Age: 41
Setting detail: OUTPATIENT SURGERY
Discharge: HOME | End: 2021-04-16
Attending: ORTHOPAEDIC SURGERY | Admitting: ORTHOPAEDIC SURGERY
Payer: MEDICAID

## 2021-04-16 ENCOUNTER — APPOINTMENT (OUTPATIENT)
Dept: GENERAL RADIOLOGY | Age: 41
End: 2021-04-16
Attending: ORTHOPAEDIC SURGERY
Payer: MEDICAID

## 2021-04-16 VITALS
RESPIRATION RATE: 3 BRPM | OXYGEN SATURATION: 94 % | TEMPERATURE: 86 F | DIASTOLIC BLOOD PRESSURE: 67 MMHG | SYSTOLIC BLOOD PRESSURE: 107 MMHG

## 2021-04-16 VITALS
OXYGEN SATURATION: 96 % | BODY MASS INDEX: 40.22 KG/M2 | SYSTOLIC BLOOD PRESSURE: 150 MMHG | RESPIRATION RATE: 20 BRPM | DIASTOLIC BLOOD PRESSURE: 80 MMHG | WEIGHT: 213 LBS | HEART RATE: 70 BPM | HEIGHT: 61 IN | TEMPERATURE: 97.3 F

## 2021-04-16 DIAGNOSIS — G89.18 POST-OP PAIN: ICD-10-CM

## 2021-04-16 DIAGNOSIS — Z01.818 PREOP TESTING: Primary | ICD-10-CM

## 2021-04-16 LAB — METER GLUCOSE: 121 MG/DL (ref 74–99)

## 2021-04-16 PROCEDURE — 64721 CARPAL TUNNEL SURGERY: CPT | Performed by: ORTHOPAEDIC SURGERY

## 2021-04-16 PROCEDURE — 7100000010 HC PHASE II RECOVERY - FIRST 15 MIN: Performed by: ORTHOPAEDIC SURGERY

## 2021-04-16 PROCEDURE — 3600000013 HC SURGERY LEVEL 3 ADDTL 15MIN: Performed by: ORTHOPAEDIC SURGERY

## 2021-04-16 PROCEDURE — 3700000000 HC ANESTHESIA ATTENDED CARE: Performed by: ORTHOPAEDIC SURGERY

## 2021-04-16 PROCEDURE — C1713 ANCHOR/SCREW BN/BN,TIS/BN: HCPCS | Performed by: ORTHOPAEDIC SURGERY

## 2021-04-16 PROCEDURE — 6360000002 HC RX W HCPCS: Performed by: PHYSICIAN ASSISTANT

## 2021-04-16 PROCEDURE — 82962 GLUCOSE BLOOD TEST: CPT

## 2021-04-16 PROCEDURE — 29835 ELBOW ARTHROSCOPY/SURGERY: CPT | Performed by: ORTHOPAEDIC SURGERY

## 2021-04-16 PROCEDURE — 2580000003 HC RX 258: Performed by: PHYSICIAN ASSISTANT

## 2021-04-16 PROCEDURE — 7100000011 HC PHASE II RECOVERY - ADDTL 15 MIN: Performed by: ORTHOPAEDIC SURGERY

## 2021-04-16 PROCEDURE — 6360000002 HC RX W HCPCS: Performed by: ANESTHESIOLOGY

## 2021-04-16 PROCEDURE — 7100000001 HC PACU RECOVERY - ADDTL 15 MIN: Performed by: ORTHOPAEDIC SURGERY

## 2021-04-16 PROCEDURE — 25320 REPAIR/REVISE WRIST JOINT: CPT | Performed by: ORTHOPAEDIC SURGERY

## 2021-04-16 PROCEDURE — 7100000000 HC PACU RECOVERY - FIRST 15 MIN: Performed by: ORTHOPAEDIC SURGERY

## 2021-04-16 PROCEDURE — 6370000000 HC RX 637 (ALT 250 FOR IP): Performed by: ANESTHESIOLOGY

## 2021-04-16 PROCEDURE — 2500000003 HC RX 250 WO HCPCS: Performed by: ORTHOPAEDIC SURGERY

## 2021-04-16 PROCEDURE — 2500000003 HC RX 250 WO HCPCS: Performed by: NURSE ANESTHETIST, CERTIFIED REGISTERED

## 2021-04-16 PROCEDURE — 2709999900 HC NON-CHARGEABLE SUPPLY: Performed by: ORTHOPAEDIC SURGERY

## 2021-04-16 PROCEDURE — 3600000003 HC SURGERY LEVEL 3 BASE: Performed by: ORTHOPAEDIC SURGERY

## 2021-04-16 PROCEDURE — 6360000002 HC RX W HCPCS: Performed by: NURSE ANESTHETIST, CERTIFIED REGISTERED

## 2021-04-16 PROCEDURE — 3700000001 HC ADD 15 MINUTES (ANESTHESIA): Performed by: ORTHOPAEDIC SURGERY

## 2021-04-16 PROCEDURE — 64718 REVISE ULNAR NERVE AT ELBOW: CPT | Performed by: ORTHOPAEDIC SURGERY

## 2021-04-16 PROCEDURE — 2720000010 HC SURG SUPPLY STERILE: Performed by: ORTHOPAEDIC SURGERY

## 2021-04-16 DEVICE — IMPLANTABLE DEVICE: Type: IMPLANTABLE DEVICE | Site: WRIST | Status: FUNCTIONAL

## 2021-04-16 RX ORDER — SODIUM CHLORIDE 0.9 % (FLUSH) 0.9 %
10 SYRINGE (ML) INJECTION PRN
Status: DISCONTINUED | OUTPATIENT
Start: 2021-04-16 | End: 2021-04-16 | Stop reason: HOSPADM

## 2021-04-16 RX ORDER — BUPIVACAINE HYDROCHLORIDE AND EPINEPHRINE 5; 5 MG/ML; UG/ML
INJECTION, SOLUTION EPIDURAL; INTRACAUDAL; PERINEURAL PRN
Status: DISCONTINUED | OUTPATIENT
Start: 2021-04-16 | End: 2021-04-16 | Stop reason: ALTCHOICE

## 2021-04-16 RX ORDER — MIDAZOLAM HYDROCHLORIDE 1 MG/ML
INJECTION INTRAMUSCULAR; INTRAVENOUS PRN
Status: DISCONTINUED | OUTPATIENT
Start: 2021-04-16 | End: 2021-04-16 | Stop reason: SDUPTHER

## 2021-04-16 RX ORDER — PROPOFOL 10 MG/ML
INJECTION, EMULSION INTRAVENOUS PRN
Status: DISCONTINUED | OUTPATIENT
Start: 2021-04-16 | End: 2021-04-16 | Stop reason: SDUPTHER

## 2021-04-16 RX ORDER — FENTANYL CITRATE 50 UG/ML
25 INJECTION, SOLUTION INTRAMUSCULAR; INTRAVENOUS EVERY 5 MIN PRN
Status: DISCONTINUED | OUTPATIENT
Start: 2021-04-16 | End: 2021-04-16 | Stop reason: HOSPADM

## 2021-04-16 RX ORDER — KETOROLAC TROMETHAMINE 30 MG/ML
INJECTION, SOLUTION INTRAMUSCULAR; INTRAVENOUS PRN
Status: DISCONTINUED | OUTPATIENT
Start: 2021-04-16 | End: 2021-04-16 | Stop reason: SDUPTHER

## 2021-04-16 RX ORDER — DEXAMETHASONE SODIUM PHOSPHATE 4 MG/ML
INJECTION, SOLUTION INTRA-ARTICULAR; INTRALESIONAL; INTRAMUSCULAR; INTRAVENOUS; SOFT TISSUE PRN
Status: DISCONTINUED | OUTPATIENT
Start: 2021-04-16 | End: 2021-04-16 | Stop reason: SDUPTHER

## 2021-04-16 RX ORDER — NEOSTIGMINE METHYLSULFATE 1 MG/ML
INJECTION, SOLUTION INTRAVENOUS PRN
Status: DISCONTINUED | OUTPATIENT
Start: 2021-04-16 | End: 2021-04-16 | Stop reason: SDUPTHER

## 2021-04-16 RX ORDER — ROCURONIUM BROMIDE 10 MG/ML
INJECTION, SOLUTION INTRAVENOUS PRN
Status: DISCONTINUED | OUTPATIENT
Start: 2021-04-16 | End: 2021-04-16 | Stop reason: SDUPTHER

## 2021-04-16 RX ORDER — LIDOCAINE HYDROCHLORIDE 20 MG/ML
INJECTION, SOLUTION EPIDURAL; INFILTRATION; INTRACAUDAL; PERINEURAL PRN
Status: DISCONTINUED | OUTPATIENT
Start: 2021-04-16 | End: 2021-04-16 | Stop reason: SDUPTHER

## 2021-04-16 RX ORDER — OXYCODONE HYDROCHLORIDE AND ACETAMINOPHEN 5; 325 MG/1; MG/1
1 TABLET ORAL ONCE
Status: COMPLETED | OUTPATIENT
Start: 2021-04-16 | End: 2021-04-16

## 2021-04-16 RX ORDER — ONDANSETRON 2 MG/ML
INJECTION INTRAMUSCULAR; INTRAVENOUS PRN
Status: DISCONTINUED | OUTPATIENT
Start: 2021-04-16 | End: 2021-04-16 | Stop reason: SDUPTHER

## 2021-04-16 RX ORDER — GLYCOPYRROLATE 1 MG/5 ML
SYRINGE (ML) INTRAVENOUS PRN
Status: DISCONTINUED | OUTPATIENT
Start: 2021-04-16 | End: 2021-04-16 | Stop reason: SDUPTHER

## 2021-04-16 RX ORDER — SODIUM CHLORIDE 9 MG/ML
INJECTION, SOLUTION INTRAVENOUS CONTINUOUS
Status: DISCONTINUED | OUTPATIENT
Start: 2021-04-16 | End: 2021-04-16 | Stop reason: HOSPADM

## 2021-04-16 RX ORDER — LABETALOL HYDROCHLORIDE 5 MG/ML
INJECTION, SOLUTION INTRAVENOUS PRN
Status: DISCONTINUED | OUTPATIENT
Start: 2021-04-16 | End: 2021-04-16 | Stop reason: SDUPTHER

## 2021-04-16 RX ORDER — SODIUM CHLORIDE 0.9 % (FLUSH) 0.9 %
10 SYRINGE (ML) INJECTION EVERY 12 HOURS SCHEDULED
Status: DISCONTINUED | OUTPATIENT
Start: 2021-04-16 | End: 2021-04-16 | Stop reason: HOSPADM

## 2021-04-16 RX ORDER — OXYCODONE HYDROCHLORIDE AND ACETAMINOPHEN 5; 325 MG/1; MG/1
1 TABLET ORAL EVERY 6 HOURS PRN
Qty: 28 TABLET | Refills: 0 | Status: SHIPPED | OUTPATIENT
Start: 2021-04-16 | End: 2021-04-23 | Stop reason: SDUPTHER

## 2021-04-16 RX ORDER — FENTANYL CITRATE 50 UG/ML
INJECTION, SOLUTION INTRAMUSCULAR; INTRAVENOUS PRN
Status: DISCONTINUED | OUTPATIENT
Start: 2021-04-16 | End: 2021-04-16 | Stop reason: SDUPTHER

## 2021-04-16 RX ADMIN — SODIUM CHLORIDE: 9 INJECTION, SOLUTION INTRAVENOUS at 11:00

## 2021-04-16 RX ADMIN — PROPOFOL 200 MG: 10 INJECTION, EMULSION INTRAVENOUS at 07:35

## 2021-04-16 RX ADMIN — LABETALOL HYDROCHLORIDE 5 MG: 5 INJECTION INTRAVENOUS at 08:03

## 2021-04-16 RX ADMIN — MIDAZOLAM 2 MG: 1 INJECTION INTRAMUSCULAR; INTRAVENOUS at 07:24

## 2021-04-16 RX ADMIN — ROCURONIUM BROMIDE 10 MG: 10 INJECTION, SOLUTION INTRAVENOUS at 08:35

## 2021-04-16 RX ADMIN — HYDROMORPHONE HYDROCHLORIDE 0.5 MG: 1 INJECTION, SOLUTION INTRAMUSCULAR; INTRAVENOUS; SUBCUTANEOUS at 10:23

## 2021-04-16 RX ADMIN — DEXAMETHASONE SODIUM PHOSPHATE 10 MG: 4 INJECTION, SOLUTION INTRAMUSCULAR; INTRAVENOUS at 07:41

## 2021-04-16 RX ADMIN — HYDROMORPHONE HYDROCHLORIDE 0.5 MG: 1 INJECTION, SOLUTION INTRAMUSCULAR; INTRAVENOUS; SUBCUTANEOUS at 10:42

## 2021-04-16 RX ADMIN — KETOROLAC TROMETHAMINE 30 MG: 30 INJECTION, SOLUTION INTRAMUSCULAR; INTRAVENOUS at 09:14

## 2021-04-16 RX ADMIN — Medication 3 MG: at 09:19

## 2021-04-16 RX ADMIN — OXYCODONE HYDROCHLORIDE AND ACETAMINOPHEN 1 TABLET: 5; 325 TABLET ORAL at 11:40

## 2021-04-16 RX ADMIN — FENTANYL CITRATE 50 MCG: 50 INJECTION, SOLUTION INTRAMUSCULAR; INTRAVENOUS at 09:15

## 2021-04-16 RX ADMIN — LIDOCAINE HYDROCHLORIDE 40 MG: 20 INJECTION, SOLUTION EPIDURAL; INFILTRATION; INTRACAUDAL; PERINEURAL at 07:35

## 2021-04-16 RX ADMIN — FENTANYL CITRATE 50 MCG: 50 INJECTION, SOLUTION INTRAMUSCULAR; INTRAVENOUS at 07:35

## 2021-04-16 RX ADMIN — FENTANYL CITRATE 50 MCG: 50 INJECTION, SOLUTION INTRAMUSCULAR; INTRAVENOUS at 08:32

## 2021-04-16 RX ADMIN — SODIUM CHLORIDE: 9 INJECTION, SOLUTION INTRAVENOUS at 08:12

## 2021-04-16 RX ADMIN — HYDROMORPHONE HYDROCHLORIDE 0.5 MG: 1 INJECTION, SOLUTION INTRAMUSCULAR; INTRAVENOUS; SUBCUTANEOUS at 10:06

## 2021-04-16 RX ADMIN — ROCURONIUM BROMIDE 30 MG: 10 INJECTION, SOLUTION INTRAVENOUS at 07:35

## 2021-04-16 RX ADMIN — ONDANSETRON 4 MG: 2 INJECTION INTRAMUSCULAR; INTRAVENOUS at 08:58

## 2021-04-16 RX ADMIN — SODIUM CHLORIDE: 9 INJECTION, SOLUTION INTRAVENOUS at 07:24

## 2021-04-16 RX ADMIN — Medication 0.6 MG: at 09:19

## 2021-04-16 RX ADMIN — LABETALOL HYDROCHLORIDE 2.5 MG: 5 INJECTION INTRAVENOUS at 08:24

## 2021-04-16 RX ADMIN — HYDROMORPHONE HYDROCHLORIDE 0.5 MG: 1 INJECTION, SOLUTION INTRAMUSCULAR; INTRAVENOUS; SUBCUTANEOUS at 09:55

## 2021-04-16 RX ADMIN — Medication 2 G: at 07:28

## 2021-04-16 RX ADMIN — FENTANYL CITRATE 50 MCG: 50 INJECTION, SOLUTION INTRAMUSCULAR; INTRAVENOUS at 07:43

## 2021-04-16 ASSESSMENT — PULMONARY FUNCTION TESTS
PIF_VALUE: 25
PIF_VALUE: 25
PIF_VALUE: 29
PIF_VALUE: 25
PIF_VALUE: 1
PIF_VALUE: 33
PIF_VALUE: 25
PIF_VALUE: 25
PIF_VALUE: 24
PIF_VALUE: 15
PIF_VALUE: 19
PIF_VALUE: 20
PIF_VALUE: 19
PIF_VALUE: 19
PIF_VALUE: 26
PIF_VALUE: 25
PIF_VALUE: 33
PIF_VALUE: 1
PIF_VALUE: 25
PIF_VALUE: 25
PIF_VALUE: 33
PIF_VALUE: 1
PIF_VALUE: 25
PIF_VALUE: 20
PIF_VALUE: 25
PIF_VALUE: 24
PIF_VALUE: 25
PIF_VALUE: 25
PIF_VALUE: 3
PIF_VALUE: 26
PIF_VALUE: 26
PIF_VALUE: 21
PIF_VALUE: 25
PIF_VALUE: 21
PIF_VALUE: 27
PIF_VALUE: 25
PIF_VALUE: 15
PIF_VALUE: 25
PIF_VALUE: 4
PIF_VALUE: 25
PIF_VALUE: 26
PIF_VALUE: 25
PIF_VALUE: 25
PIF_VALUE: 20
PIF_VALUE: 21
PIF_VALUE: 16
PIF_VALUE: 21
PIF_VALUE: 1
PIF_VALUE: 25
PIF_VALUE: 20
PIF_VALUE: 4
PIF_VALUE: 24
PIF_VALUE: 17
PIF_VALUE: 1
PIF_VALUE: 20
PIF_VALUE: 22
PIF_VALUE: 26
PIF_VALUE: 25
PIF_VALUE: 20
PIF_VALUE: 1
PIF_VALUE: 1
PIF_VALUE: 20
PIF_VALUE: 20
PIF_VALUE: 21
PIF_VALUE: 25
PIF_VALUE: 31
PIF_VALUE: 2
PIF_VALUE: 19
PIF_VALUE: 27

## 2021-04-16 ASSESSMENT — PAIN SCALES - GENERAL
PAINLEVEL_OUTOF10: 7
PAINLEVEL_OUTOF10: 10
PAINLEVEL_OUTOF10: 6

## 2021-04-16 ASSESSMENT — PAIN DESCRIPTION - DESCRIPTORS: DESCRIPTORS: SORE

## 2021-04-16 ASSESSMENT — PAIN DESCRIPTION - ORIENTATION
ORIENTATION: LEFT
ORIENTATION: LEFT

## 2021-04-16 ASSESSMENT — PAIN DESCRIPTION - PROGRESSION
CLINICAL_PROGRESSION: GRADUALLY IMPROVING
CLINICAL_PROGRESSION: GRADUALLY WORSENING

## 2021-04-16 ASSESSMENT — PAIN DESCRIPTION - PAIN TYPE: TYPE: SURGICAL PAIN

## 2021-04-16 ASSESSMENT — LIFESTYLE VARIABLES: SMOKING_STATUS: 1

## 2021-04-16 NOTE — H&P
Department of Orthopedic Surgery/Hand Surgery  Resident Office Note           CHIEF COMPLAINT:         Chief Complaint   Patient presents with    Wrist Pain       left wrist pain and numbness          History Obtained From:  patient     HISTORY OF PRESENT ILLNESS:                 Rosemarie Bustillo is a 41y.o. year old  female who presents for evaluation. Left wrist pain and left numbness and tingling. Patient states that she has been waking up in the melanite with left hand pain and numbness. Patient states this is been going on for last couple years and has progressively gotten worse. Patient's not tried any bracing or injections in the past.  Patient does have a history of previous right carpal tunnel and right ECU tendon surgery. Patient states that couple days ago she felt like she needed to crack her wrist and began to rotate her left wrist so she felt a pop. When she began having chronic left-sided ulnar pain and snapping. Patient is right-hand dominant. the patients occupation is a .     Patient here for MRI follow-up and review. Patient states that her numbness and tingling has progressively gotten worse over the last couple weeks since last seen in office.   She says that she still having snapping and pain over the lateral aspect of her wrist.     Past Medical History:    Past Medical History        Past Medical History:   Diagnosis Date    Anxiety      Depression      Diabetes mellitus ([address]      Herpes genitalia       no recent outbreak as of 5-21-20     Kidney stones       for OR 5-22-20     Lower back pain       ruptured discs, lumbar    Seasonal allergies           Past Surgical History:    Past Surgical History         Past Surgical History:   Procedure Laterality Date    CARPAL TUNNEL RELEASE Right 9/27/2019     RIGHT CARPAL TUNNEL RELEASE ENDOSCOPIC performed by Tomas Mcwilliams MD at [facility]         x    COLONOSCOPY        CYSTOSCOPY        DILATION AND CURETTAGE OF UTERUS        ENDOMETRIAL ABLATION        HYSTERECTOMY        HYSTEROSCOPY   01/18/2017     D & C    LEEP        LITHOTRIPSY        LITHOTRIPSY Right 5/22/2020     CYSTOSCOPY RETROGRADE PYELOGRAM LASER LITHOTRIPSY, URETEROSCOPY, RIGHT STRING STENT INSERTION performed by Kenton Lopez DO at [facility] OTHER SURGICAL HISTORY Bilateral 03/01/2017     Total Hysterectomy Bilateral Salpingectomy     TUBAL LIGATION        WRIST ARTHROSCOPY Right 9/27/2019     RIGHT WRIST ARTHROSCOPY, OPEN TFCC REPAIR WITH DISTAL ULNAR RADIAL JOINT, ECU TENDON STABILIZATION (Carlyon [place] ANCHORS) performed by Cruz Treviño MD at [facility] OR         Current Medications:   Current Hospital Medications   No current facility-administered medications for this visit. Allergies: Allergies   Allergen Reactions    Tape Jose Roberto Dryer Tape] Hives       And steri strips          Social History:   Social History               Socioeconomic History    Marital status: Single       Spouse name: Not on file    Number of children: Not on file    Years of education: Not on file    Highest education level: Not on file   Occupational History    Not on file   Social Needs    Financial resource strain: Not on file    Food insecurity       Worry: Not on file       Inability: Not on file    Transportation needs       Medical: Not on file       Non-medical: Not on file   Tobacco Use    Smoking status: Current Every Day Smoker       Packs/day: 0.50       Years: 25.00       Pack years: 12.50       Types: Cigarettes    Smokeless tobacco: Never Used   Substance and Sexual Activity    Alcohol use:  No       Alcohol/week: 1.0 standard drinks       Types: 1 Shots of liquor per week       Comment: rarely    Drug use: No    Sexual activity: Never   Lifestyle    Physical activity       Days per week: Not on file       Minutes per session: Not on file    Stress: Not on file   Relationships    Social connections     Talks on phone: Not on file       Gets together: Not on file       Attends Yazdanism service: Not on file       Active member of club or organization: Not on file       Attends meetings of clubs or organizations: Not on file       Relationship status: Not on file    Intimate partner violence       Fear of current or ex partner: Not on file       Emotionally abused: Not on file       Physically abused: Not on file       Forced sexual activity: Not on file   Other Topics Concern    Not on file   Social History Narrative    Not on file         Family History:   Family History         Family History   Problem Relation Age of Onset    Heart Disease Mother      Other Mother           cirrhosis liver, non alcoholic    Diabetes Father      Kidney Disease Father           dialysis    Stroke Father      High Blood Pressure Father      Heart Disease Father              REVIEW OF SYSTEMS:    CONSTITUTIONAL:  negative for  fevers, chills, sweats and fatigue  RESPIRATORY:  negative for  dry cough, cough with sputum, dyspnea, wheezing and chest pain  CARDIOVASCULAR:  negative for  chest pain, dyspnea, palpitations, syncope  GASTROINTESTINAL:  negative for nausea, vomiting, change in bowel habits, diarrhea, constipation and abdominal pain  BEHAVIOR/PSYCH:  negative for poor appetite, increased appetite, decreased sleep and poor concentration  MUSCULOSKELETAL:  positive for  pain        PHYSICAL EXAM:    VITALS:  Temp 97.1 °F (36.2 °C) (Infrared)   Resp 18   Ht 5' 1\" (1.549 m)   Wt 210 lb (95.3 kg)   LMP 03/27/2017   BMI 39.68 kg/m²      CONSTITUTIONAL:  awake, alert, cooperative, no apparent distress, and appears stated age     LUNGS:  No increased work of breathing, good air exchange, clear to auscultation bilaterally, no crackles or wheezing     CARDIOVASCULAR:  Normal apical impulse, regular rate and rhythm, normal S1 and S2, no S3 or S4, and no murmur noted     ABDOMEN: Soft, Non-tender to palpation    Cervical Exam:     On physical exam, Noemi Cuevas is well-developed, well-nourished, oriented to person, place and time. her gait is normal.  On evaluation of her cervical spine, she has full range of motion of the cervical spine without pain. There is no cervical tenderness to palpation.         Left upper extremity exam:     The skin overlying the hand is  intact. There is not evidence of scar, lesion, laceration, or abrasion. The motion in the small joints of the hand are intact with no stiffness or deformity. There is no masses or adenopathy in bilateral upper extremities. Radial pulses are 2+ and symmetric bilaterally. Capillary refill is intact and < 2 seconds. Motor strength is 5/5 with flexion and extension of the small finger joints. The ROM to fingers are normal.  Negative left grind, negative Finkelstein's, negative tenderness over the A1 pulleys, APB 4/5 , positive thenar atrophy, positive Wartenberg sign, negative Anil sign, positive Tinel's at the wrist, positive Tinel's at the elbow, positive flexion elbow test.  Positive ECU synergistic test, positive snapping of the ECU with wrist in supination with ulnar to radial deviation of the wrist.  Negative tenderness over the fovea. Positive laxity to the DRUJ and supination and pronation           DATA:    MRI left wrist  Demonstrating chronic TFCC redundancy without laxity on MRI, mild inflammation around the ECU tendon at the level of the wrist.      IMPRESSION/RECOMMENDATIONS:       Impression:  Left carpal tunnel syndrome  Left cubital tunnel syndrome  Left ECU tendon tendinitis  Left DRUJ instability     Discussed diagnosis and x-ray findings with patient today. MRI reviewed with patient today. Discussed possible treatment options and surgical options for left carpal tunnel and left cubital tunnel syndrome. Discussed surgical options with patient today. Patient would like to proceed with surgery.   Discussed risk and benefits of the proceed with the procedure at this time. History and Physical Update     Patient was seen and examined. Patient's history and physical was reviewed with the patient. There has been no significant interval changes.

## 2021-04-16 NOTE — BRIEF OP NOTE
Brief Postoperative Note      Patient: Subha Medina  YOB: 1980  MRN: [id number]    Date of Procedure: 4/16/2021    Pre-Op Diagnosis: LEFT TFCC INJURY LEFT CARPAL TUNNEL SYNDROME LEFT WRIST TENDONITIS LEFT CUBITAL TUNNEL SYNDROME    Post-Op Diagnosis: Same       Procedure(s):  LEFT ULNAR NERVE DECOMPRESSION AT THE ELBOW TRANSPOSITION  LEFT CARPAL TUNNEL RELEASE  LEFT WRIST ARTHROSCOPY WITH OPEN TFCC REPAIR AND TENDON STABILIZATION   ++MINI MYTEK ANCHORS++    Surgeon(s):  Chester Sena MD    Assistant:  Resident: Roland Sosa DO    Anesthesia: General    Estimated Blood Loss (mL): Minimal    Complications: None    Specimens:   * No specimens in log *    Implants:  Implant Name Type Inv.  Item Serial No.  Lot No. LRB No. Used Action   ANCHOR SUT ORTHOCORD 4-0 DRL BIT OD1.3MM NDL C-1 TAPERPOINT  ANCHOR SUT ORTHOCORD 4-0 DRL BIT OD1.3MM NDL C-1 TAPERPOINT  [facility] [id number] Left 1 Implanted         Drains: * No LDAs found *    Findings: see op note    Electronically signed by Roland Sosa DO on 4/16/2021 at 9:49 AM

## 2021-04-16 NOTE — ANESTHESIA POSTPROCEDURE EVALUATION
Department of Anesthesiology  Postprocedure Note    Patient: Josh Nunez  MRN: [id number]  YOB: 1980  Date of evaluation: 4/16/2021  Time:  3:13 PM     Procedure Summary     Date: 04/16/21 Room / Location: [facility] OR 05 / [facility]    Anesthesia Start: 0773 Anesthesia Stop: 4697    Procedures:       LEFT ULNAR NERVE DECOMPRESSION AT [address] (Left )      LEFT CARPAL TUNNEL RELEASE (Left )      LEFT WRIST ARTHROSCOPY WITH OPEN TFCC REPAIR AND TENDON STABILIZATION (Left ) Diagnosis: (LEFT TFCC INJURY LEFT CARPAL TUNNEL SYNDROME LEFT WRIST TENDONITIS LEFT CUBITAL TUNNEL SYNDROME)    Surgeons: Santos Bolden MD Responsible Provider: Merlyn Vieira MD    Anesthesia Type: general ASA Status: 3          Anesthesia Type: general    Shashank Phase I: Shashank Score: 10    Shashank Phase II: Shashank Score: 10    Last vitals: Reviewed and per EMR flowsheets.        Anesthesia Post Evaluation    Patient location during evaluation: PACU  Level of consciousness: awake  Airway patency: patent  Nausea & Vomiting: no nausea and no vomiting  Complications: no  Cardiovascular status: hemodynamically stable  Respiratory status: acceptable

## 2021-04-16 NOTE — OP NOTE
Operative Note      Patient: Filiberto Menjivar  YOB: 1980  MRN: [id number]    Date of Procedure: 4/16/2021    Pre-Op Diagnosis: LEFT TFCC INJURY LEFT CARPAL TUNNEL SYNDROME LEFT WRIST TENDONITIS LEFT CUBITAL TUNNEL SYNDROME    Post-Op Diagnosis: Same       Procedure(s):  LEFT ULNAR NERVE DECOMPRESSION AT THE ELBOW TRANSPOSITION  LEFT CARPAL TUNNEL RELEASE  LEFT WRIST ARTHROSCOPY WITH OPEN TFCC REPAIR AND TENDON STABILIZATION   ++MINI MYTEK ANCHORS++   Left DRUJ stabilization with dorsal capsulodesis  Left ECU stabilization    Surgeon(s):  Evette Bernstein MD    Assistant:   Resident: Pete John DO    Anesthesia: General    Estimated Blood Loss (mL): Minimal    Complications: None    Specimens:   * No specimens in log *    Implants:  Implant Name Type Inv. Item Serial No.  Lot No. LRB No. Used Action   ANCHOR SUT ORTHOCORD 4-0 DRL BIT OD1.3MM NDL C-1 TAPERPOINT  ANCHOR SUT ORTHOCORD 4-0 DRL BIT OD1.3MM NDL C-1 TAPERPOINT  [facility] [id number] Left 1 Implanted         Drains: * No LDAs found *    Findings: see details    Detailed Description of Procedure:   OPERATIVE REPORT       DATE OF PROCEDURE:   4/16/2021       PREOPERATIVE DIAGNOSES:  1. Left carpal tunnel syndrome. 2.  left ulnar neuropathy at the elbow. 3.  Left TFCC tear  4. Left ECU tendon instability  5. Left DRUJ instability     POSTOPERATIVE DIAGNOSES: Same    PROCEDURES PERFORMED:  1. Left carpal tunnel release. 2.    Left ulnar nerve in situ decompression. 3.    Left wrist arthroscopy with debridement of synovitis and peripheral TFCC tear  4. Left open TFCC repair using micro-Mytec absorbable anchor  5. Left DRUJ stabilization with dorsal capsulodesis  6. Left extensor carpi ulnaris tendon stabilization using a slip of the extensor retinaculum as a sling     SURGEON:  Cristian Shaikh M.D.     ANESTHESIA:  1.   General.  2.  Local anesthetic by surgeon consisting of approximately 20 mL of 0.25%  Marcaine with epinephrine.     ASSISTANT:  1. Dr Saintclair Bogaert, orthopedic surgery resident. 2.  Melissa Mccord, physician assistant certified. She was present  throughout the procedure. Her assistance was used for preoperative  positioning, intraoperative retraction, closure, and dressing application. Her assistance expedited the case and decreased the surgical time.     TOTAL TOURNIQUET TIME:  Approximately 88 minutes at 250 mmHg of brachial  tourniquet.     FINDINGS:  1.  Evidence of median nerve compression beneath the transverse carpal  ligament that was adequately decompressed with release of the transverse  carpal ligament. Status post decompression, the nerve was visualized  throughout its visualized course and it was decompressed including the  distal forearm fascia through the carpal tunnel to its stratification  distally. 2.  Evidence of ulnar nerve entrapment at the cubital tunnel. Status post  cubital tunnel decompression, the nerve was fully decompressed without any  significant instability. 3.  Wrist arthroscopy revealed peripheral synovitis of the dorsal ulnar capsule as well as a peripheral tear of the TFCC. The remaining ligamentous structures as well as the articular surfaces are well preserved and intact  4. Exam under anesthesia revealed significant DRUJ instability both pronation supination the mid range  5. There is complete volar subluxation of the extensor carpi ulnaris tendon. This was stabilized dorsally with a slip of the extensor retinaculum  6. Status post repair of the TFCC and dorsal capsulodesis of the DRUJ there was excellent stability to the DRUJ and full supination with forearm rotation to neutral    COMPLICATIONS:  None.     DISPOSITION:  The patient remained stable throughout the procedure.     OPERATIVE INDICATIONS:  The patient presents with persistent  recalcitrant left upper extremity complaints.   After failing conservative  management, she wished to proceed with surgical intervention. The risks,  benefits, alternatives, and complications were fully outlined and explained  to her including, but not limited to the risks of infection; damage to  nerve, vessels, or tendons; failure to improve her symptoms; worsening  symptoms; pillar pain; thenar pain; hypothenar pain, scar sensitivity; and  possible need for further surgery and/or therapy. She understood and  wished to proceed.     SURGERY IN DETAIL:  The patient was identified in the holding area. Her  Left upper extremity was identified as the surgical site. She was then  seen by anesthesia, taken to the operating room, placed supine on the  operating table, and underwent general anesthesia per anesthesia  department. All bony prominences were well padded. A well-padded arm  tourniquet was placed. The extremity was prepped and draped in  the standard sterile fashion. The arm was exsanguinated. The tourniquet  was inflated to 250 mmHg. Total tourniquet time was approximately one-half  hour.     An incision in line with the radial border of the ring finger extending  from Renner's cardinal line to within 1 cm of the volar wrist flexor crease  was then created followed by blunt dissection and identification of the  superficial palmar fascia site longitudinally followed by blunt dissection  and identification of the distal margin of the transverse carpal ligament  as well as superficial palmar arch and underlying median nerve. Proximal  dissection was performed to identify the contents of Guyon's canal as well  as the distal forearm fascia. The distal fascia was then incised and  extended distally. Decompressing the median nerve from proximally to  distally. Blunt dissection was performed distally to confirm complete  division of the transverse carpal ligament distally as well as preservation  of the superficial palmar arch as well as visualizing the stratification of  the median nerve.   Proximally, the remaining with normal sterile saline. No significant extravasation was appreciated. A 15 blade scalpel was then used to incise the skin for the 3-4 portal.  This then blunt blunt dissection with a hemostat followed by insertion of a standard 2.7 mm arthroscope. The scaphoid, scaphoid facet, lunate, and lunate facets were well preserved. The scapholunate ligament as well as the radioscaphocapitate and short and long radiolunate ligaments were preserved. The scope was placed ulnarly to inspect the TFCC. There is synovitis involving the dorsal ulnar capsule as well as the periphery of the TFCC. A 6R portal was then localized with a 18-gauge needle. Mikel incision blunt dissection followed by insertion of a arthroscopic shaver. Synovectomy was performed of the dorsal ulnar capsule as well as along the periphery of the TFCC immobilizing the peripheral TFCC fully. With this accomplished the arthroscopic instruments were removed. The hand was taken out of traction. An incision incorporating the 6R portal was then created over the dorsal ulnar aspect of the wrist.  There is then full blunt dissection dedication and preservation of the dorsal cutaneous branch of the ulnar nerve. Is preserved throughout the procedure. The extensor retinaculum was then released over the ulnar aspect and reflected from ulnar to radial.  The extensor digiti quinti tendon was mobilized from beneath the flap. It was transposed left transposed. The flap was mobilized ulnarly to the level of the fourth compartment. The extensor carpi ulnaris tendon was found to be fully dislocated palmarly to the ulna. This was mobilized proximally and distally to allow mobilization and relocation dorsally. A retractor was then inserted to allow identification of the DRUJ. The DRUJ capsule was incised with a 15 blade scalpel proximally extending distally. Care was taken to preserve the dorsal radial ulnar ligaments.   The TFCC was then identified as previously mobilized. The TFCC was then repaired. A micro-Mytec bio absorbable anchor was then inserted at the fovea at the base of the ulnar styloid. Excellent purchase was achieved. The sutures were passed in a horizontal mattress type fashion. The forearm was then fully supinated and the DRUJ reduced. The past sutures were then tied securely down repairing the TFCC nicely. This provided excellent stability but there is still laxity to the DRUJ. The DRUJ stabilization was undertaken by repairing the dorsal capsule and a capsulodesis type fashion. Advancing the tissues from distal ulnar to proximal radial was then undertaken and secured in place with remnants of the FiberWire suture and Ortho cord. This stabilized the DRUJ nicely. The DRUJ was very stable in supination. Pronation was able to be achieved to about neutral before encountering resistance. The ECU tendon was then stabilized dorsally with the forearm in supination using a central third slip of the extensor retinaculum. This was split in the ECU tendon placed dorsally and secured in place with multiple FiberWire sutures securing the ECU dorsally. The forearm was then pronated to neutral without any instability of the ECU remaining. The remaining extensor retinaculum was then repaired back to its anatomic location using Vicryl sutures. The tourniquet was deflated copious irrigation undertaken. The skin was then repaired with 2-0 Vicryl and a 3-0 Monocryl followed by Dermabond.   A bulky sterile dressing and a sugar tong splint was then applied with the forearm and neutral rotation.           Ayo Stephens MD         Electronically signed by Nahum Kramer MD on 4/16/2021 at 11:02 AM

## 2021-04-16 NOTE — PROGRESS NOTES
Have you been tested for COVID  Yes           Have you been told you were positive for COVID No  Have you had any known exposure to someone that is positive for COVID No  Do you have a cough                   No              Do you have shortness of breath No                 Do you have a sore throat            No                Are you having chills                    No                Are you having muscle aches. No                    Please come to the hospital wearing a mask and have your significant other wear a mask as well. Both of you should check your temperature before leaving to come here,  if it is 100 or higher please call [phone] for instruction.
PT RECEIVED IN PACU 2 FROM PACU REPORT RECEIVED ASSESSMENT AND VS OBTAINED FAMILY AT BEDSIDE AND UPDATED NOURISHMENT GIVEN .  PT PAIN 6/10
Pt tolerating po medicated for pain and assisted up to restroom x 1 at this time and tolerated well no difficulty.   1200 pt daugher back from pharmacy and picking up prescriptions , discharge instructions given to pt and her daughter at this time both verbalized understanding of instructions and post op restrictions and to call and schedule follow up appointment for 1 week with DR Jamarcus Palacios, PAMPHLETS GIVEN
procedure to notify you if your arrival time changes    [x] If you receive a survey after surgery we would greatly appreciate your comments    [] Parent/guardian of a minor must accompany their child and remain on the premises  the entire time they are under our care     [] Pediatric patients may bring favorite toy, blanket or comfort item with them    [] A caregiver or family member must remain with the patient during their stay if they are mentally handicapped, have dementia, disoriented or unable to use a call light or would be a safety concern if left unattended    [x] Please notify surgeon if you develop any illness between now and time of surgery (cold, cough, sore throat, fever, nausea, vomiting) or any signs of infections  including skin, wounds, and dental.    [x]  The Outpatient Pharmacy is available to fill your prescription here on your day of surgery, ask your preop nurse for details    [] Other instructions    EDUCATIONAL MATERIALS PROVIDED:    [] PAT Preoperative Education Packet/Booklet     [] Medication List    [] Transfusion bracelet applied with instructions    [] Shower with soap, lather and rinse well, and use CHG wipes provided the evening before surgery as instructed    [] Incentive spirometer with instructions

## 2021-04-19 NOTE — PROGRESS NOTES
CLINICAL PHARMACY NOTE: MEDS TO [address] [place] [address] Drive Select Patient?: No  Total # of Prescriptions Filled: 1   The following medications were delivered to the patient:  · Oxycodone 5/ 325 mg    Total # of Interventions Completed: 2  Time Spent (min): 30    Additional Documentation:

## 2021-04-23 DIAGNOSIS — G89.18 POST-OP PAIN: ICD-10-CM

## 2021-04-23 RX ORDER — OXYCODONE HYDROCHLORIDE AND ACETAMINOPHEN 5; 325 MG/1; MG/1
1 TABLET ORAL EVERY 6 HOURS PRN
Qty: 28 TABLET | Refills: 0 | Status: SHIPPED
Start: 2021-04-23 | End: 2021-04-30 | Stop reason: SDUPTHER

## 2021-04-23 NOTE — TELEPHONE ENCOUNTER
Patient is requesting a medication refill, OARRS complete. Patient is 7 days out from surgery of Lt CTR, Lt ulnar nerve in situ decompression Lt wrist scope TFCC repair, DRUJ. Patient was last seen on 4-16-21 and patients next appointment is 4-26-21. Patient was last given Percocet 5-325 q6hr PRN on 4-16-21.

## 2021-04-26 ENCOUNTER — OFFICE VISIT (OUTPATIENT)
Dept: ORTHOPEDIC SURGERY | Age: 41
End: 2021-04-26

## 2021-04-26 VITALS — BODY MASS INDEX: 40.22 KG/M2 | WEIGHT: 213 LBS | RESPIRATION RATE: 18 BRPM | HEIGHT: 61 IN

## 2021-04-26 DIAGNOSIS — S69.81XA TFCC (TRIANGULAR FIBROCARTILAGE COMPLEX) INJURY, RIGHT, INITIAL ENCOUNTER: Primary | ICD-10-CM

## 2021-04-26 PROCEDURE — 99024 POSTOP FOLLOW-UP VISIT: CPT | Performed by: ORTHOPAEDIC SURGERY

## 2021-04-26 NOTE — PROGRESS NOTES
A fiberglass short arm cast was applied to Her Left arm. Neurovascular status was checked pre and post application. Patient was neurovascularly intact after the application process. The patient denied any issues with fit or comfort of the cast/splint. advised to avoid activities that put them at risk for falling. Patient instructed to call our office if there are any issues with the cast/splint.

## 2021-04-30 DIAGNOSIS — G89.18 POST-OP PAIN: ICD-10-CM

## 2021-04-30 RX ORDER — OXYCODONE HYDROCHLORIDE AND ACETAMINOPHEN 5; 325 MG/1; MG/1
1 TABLET ORAL EVERY 6 HOURS PRN
Qty: 28 TABLET | Refills: 0 | Status: SHIPPED | OUTPATIENT
Start: 2021-04-30 | End: 2021-05-07

## 2021-04-30 NOTE — TELEPHONE ENCOUNTER
Patient is requesting a medication refill, OARRS complete. Patient is 2 weeks out from surgery of Lt CTR, Lt ulnar nerve in situ decompression Lt wrist scope TFCC repair, DRUJ. Patient was last seen on 4/26/21 and patients next appointment is 5/27/21. Patient was last given Percocet 5-325mg q6 prn on 4/23/21.

## 2021-05-03 ENCOUNTER — NURSE ONLY (OUTPATIENT)
Dept: ORTHOPEDIC SURGERY | Age: 41
End: 2021-05-03

## 2021-05-03 DIAGNOSIS — S69.81XA TFCC (TRIANGULAR FIBROCARTILAGE COMPLEX) INJURY, RIGHT, INITIAL ENCOUNTER: Primary | ICD-10-CM

## 2021-05-07 DIAGNOSIS — S69.81XA TFCC (TRIANGULAR FIBROCARTILAGE COMPLEX) INJURY, RIGHT, INITIAL ENCOUNTER: Primary | ICD-10-CM

## 2021-05-07 RX ORDER — HYDROCODONE BITARTRATE AND ACETAMINOPHEN 5; 325 MG/1; MG/1
1 TABLET ORAL EVERY 6 HOURS PRN
Qty: 28 TABLET | Refills: 0 | Status: SHIPPED
Start: 2021-05-07 | End: 2021-05-17 | Stop reason: SDUPTHER

## 2021-05-07 NOTE — PROGRESS NOTES
Patient stop in today for a cast check. She is 2 weeks out from left wrist arthroscopy with open TFCC repair and DRUJ stabilization and ECU stabilization with concurrent ulnar nerve decompression and carpal tunnel release. Patient short arm cast was removed and an exos short arm splint was fitted. Pt was instructed to leave splint on at all times. She verbalized understanding. Patient will follow up with Dr. Calos Mccartney in 3 weeks. She is to call office if needed.

## 2021-05-17 DIAGNOSIS — S69.81XA TFCC (TRIANGULAR FIBROCARTILAGE COMPLEX) INJURY, RIGHT, INITIAL ENCOUNTER: ICD-10-CM

## 2021-05-17 RX ORDER — HYDROCODONE BITARTRATE AND ACETAMINOPHEN 5; 325 MG/1; MG/1
1 TABLET ORAL EVERY 6 HOURS PRN
Qty: 28 TABLET | Refills: 0 | Status: SHIPPED
Start: 2021-05-17 | End: 2021-05-24 | Stop reason: SDUPTHER

## 2021-05-24 DIAGNOSIS — S69.81XA TFCC (TRIANGULAR FIBROCARTILAGE COMPLEX) INJURY, RIGHT, INITIAL ENCOUNTER: ICD-10-CM

## 2021-05-24 RX ORDER — HYDROCODONE BITARTRATE AND ACETAMINOPHEN 5; 325 MG/1; MG/1
1 TABLET ORAL EVERY 6 HOURS PRN
Qty: 28 TABLET | Refills: 0 | Status: SHIPPED
Start: 2021-05-24 | End: 2021-05-27 | Stop reason: SDUPTHER

## 2021-05-24 NOTE — TELEPHONE ENCOUNTER
Patient is requesting a medication refill, OARRS complete. Patient is 5 weeks out left ulnar nerve decompression with transposition, left carpal tunnel release left wrist arthroscopy with open TFCC repair and tendon stabilization. Patient was last seen on 4/26 and patients next appointment is 5/27. Patient was last given Norco 5 mg every 6 hours on 5/17.

## 2021-05-27 ENCOUNTER — OFFICE VISIT (OUTPATIENT)
Dept: ORTHOPEDIC SURGERY | Age: 41
End: 2021-05-27

## 2021-05-27 VITALS — BODY MASS INDEX: 40.22 KG/M2 | WEIGHT: 213 LBS | RESPIRATION RATE: 22 BRPM | HEIGHT: 61 IN

## 2021-05-27 DIAGNOSIS — G56.02 LEFT CARPAL TUNNEL SYNDROME: Primary | ICD-10-CM

## 2021-05-27 DIAGNOSIS — G56.22 CUBITAL TUNNEL SYNDROME ON LEFT: ICD-10-CM

## 2021-05-27 DIAGNOSIS — S69.81XA TFCC (TRIANGULAR FIBROCARTILAGE COMPLEX) INJURY, RIGHT, INITIAL ENCOUNTER: ICD-10-CM

## 2021-05-27 PROCEDURE — 99024 POSTOP FOLLOW-UP VISIT: CPT | Performed by: ORTHOPAEDIC SURGERY

## 2021-05-27 RX ORDER — HYDROCODONE BITARTRATE AND ACETAMINOPHEN 5; 325 MG/1; MG/1
1 TABLET ORAL EVERY 6 HOURS PRN
Qty: 28 TABLET | Refills: 0 | Status: SHIPPED | OUTPATIENT
Start: 2021-05-29 | End: 2021-06-05

## 2021-05-27 RX ORDER — METHYLPREDNISOLONE 4 MG/1
TABLET ORAL
Qty: 1 KIT | Refills: 0 | Status: SHIPPED | OUTPATIENT
Start: 2021-05-27 | End: 2021-06-02

## 2021-06-03 DIAGNOSIS — G56.02 LEFT CARPAL TUNNEL SYNDROME: ICD-10-CM

## 2021-06-03 DIAGNOSIS — S69.81XA TFCC (TRIANGULAR FIBROCARTILAGE COMPLEX) INJURY, RIGHT, INITIAL ENCOUNTER: Primary | ICD-10-CM

## 2021-06-03 DIAGNOSIS — G89.18 POST-OP PAIN: ICD-10-CM

## 2021-06-03 RX ORDER — ACETAMINOPHEN AND CODEINE PHOSPHATE 300; 30 MG/1; MG/1
1 TABLET ORAL EVERY 4 HOURS PRN
Qty: 42 TABLET | Refills: 0 | Status: SHIPPED
Start: 2021-06-03 | End: 2021-06-11 | Stop reason: SDUPTHER

## 2021-06-03 NOTE — TELEPHONE ENCOUNTER
Patient is requesting a medication refill, OARRS complete. Patient is 7 weeks out from surgery of LEFT ULNAR NERVE DECOMPRESSION AT THE ELBOW TRANSPOSITION, LT CTR, LT WRIST SCOPE WITH TFCC REPAIR, DRUJ STABILIZATION. Patient was last seen on 5-27-21 and patients next appointment is 7-8-21. Patient was last given Norco 5-325 q6hr PRN on 5-29-21.    Tylenol #3 ordered

## 2021-06-09 ENCOUNTER — EVALUATION (OUTPATIENT)
Dept: OCCUPATIONAL THERAPY | Age: 41
End: 2021-06-09
Payer: MEDICAID

## 2021-06-09 DIAGNOSIS — G56.22 CUBITAL TUNNEL SYNDROME ON LEFT: ICD-10-CM

## 2021-06-09 DIAGNOSIS — G56.02 CARPAL TUNNEL SYNDROME ON LEFT: ICD-10-CM

## 2021-06-09 DIAGNOSIS — S69.81XA TFCC (TRIANGULAR FIBROCARTILAGE COMPLEX) INJURY, RIGHT, INITIAL ENCOUNTER: Primary | ICD-10-CM

## 2021-06-09 PROCEDURE — 97165 OT EVAL LOW COMPLEX 30 MIN: CPT | Performed by: OCCUPATIONAL THERAPIST

## 2021-06-09 NOTE — PROGRESS NOTES
[] Neuromuscular Re-Ed            [] ADL/IADL re-training    [x] Therapeutic Activity       [x] Pain Management with/without modalities PRN                 [x] Manual Therapy                      [x] Splinting                      [] Scar Management                   []Joint Protection/Training  []Ergonomics                             [] Joint Mobilization        [] Adaptive Equipment Assessment/Training                             [] Manual Edema Mobilization   [] Myofascial Release                 [] Energy Conservation/Work Simplification  [x] GM/FM Coordination       [] Safety retraining/education per  individual diagnosis/goals  [x] Desensitization       Patient Specific Goal: To be able to lift her grandson up to change him without pain                             GOALS (Long term same as Short term):  1) Patient will demonstrate good understanding of home program (exercises/activities/diagnosis/prognosis/goals) with good accuracy. 2) Patient will demonstrate increased active/passive range of motion of their L to [facility] for ADL/IADL completion. 3) Patient will demonstrate increased /pinch strength of at least 10 / 5 pinch pounds of their L hand. 4) Patient to report decreased pain in their affected L upper extremity from 9/10 to 4/10 or less with resistive functional use. 5) Increase in fine motor function as evidenced by decreased time to complete 9-hole peg test and/or MRMT test by at least 5-10 seconds. 6) Patient to report 100% compliance with their splint wear, care, and precautions if needed. 7) Patient will demonstrate a non-tender/non-adherent scar. 8) Patient will decrease QuickDASH score to 25% or less for increased participation in daily functional activities.        Past Medical History:   Past Medical History:   Diagnosis Date    Anxiety     Arthritis     Carpal tunnel syndrome, left     Depression     Diabetes mellitus (HCC)     Herpes genitalia     no outbreaks as of 4/13/2021    Lower back pain     ruptured discs, lumbar    Seasonal allergies      Past Surgical History:   Past Surgical History:   Procedure Laterality Date    ARM SURGERY Left 4/16/2021    LEFT ULNAR NERVE DECOMPRESSION AT THE ELBOW TRANSPOSITION performed by Evette Bernstein MD at [facility] Right 9/27/2019    RIGHT CARPAL TUNNEL RELEASE ENDOSCOPIC performed by Evette Bernstein MD at [facility] Left 4/16/2021    LEFT CARPAL TUNNEL RELEASE performed by Evette Bernstein MD at [facility]      x3    COLONOSCOPY      CYSTOSCOPY      DILATION AND CURETTAGE OF UTERUS      ENDOMETRIAL ABLATION      HYSTERECTOMY      HYSTEROSCOPY  01/18/2017    D & C    LEEP      LITHOTRIPSY      LITHOTRIPSY Right 5/22/2020    CYSTOSCOPY RETROGRADE PYELOGRAM LASER LITHOTRIPSY, URETEROSCOPY, RIGHT STRING STENT INSERTION performed by Fawn Lopez DO at [place] [address] OTHER SURGICAL HISTORY Bilateral 03/01/2017    Total Hysterectomy Bilateral Salpingectomy     TUBAL LIGATION      WRIST ARTHROSCOPY Right 9/27/2019    RIGHT WRIST ARTHROSCOPY, OPEN TFCC REPAIR WITH DISTAL ULNAR RADIAL JOINT, ECU TENDON STABILIZATION (Omkar Canavan ANCHORS) performed by Evette Bernstein MD at [place] [address] WRIST ARTHROSCOPY Left 4/16/2021    LEFT WRIST ARTHROSCOPY WITH OPEN TFCC REPAIR AND TENDON STABILIZATION performed by Evette Bernstein MD at [address] [place] [address]       Reason for Referral: Pt is a 41year old female who presents this date s/p LEFT ULNAR NERVE DECOMPRESSION AT THE ELBOW TRANSPOSITION, LEFT CARPAL TUNNEL RELEASE, LEFT WRIST ARTHROSCOPY WITH OPEN TFCC REPAIR AND TENDON STABILIZATION ,Left DRUJ stabilization with dorsal capsulodesis, Left ECU stabilization occurring on 4/16/21. Pt is 7 weeks post op this date. Pt presents this date for initial occupational therapy evaluation for Left wrist romstrengthening at 8 weeks post op, modalities, Scar desensitization and management. Home Living: living with daughter to help her with her son   Prior Level of Function: independent     Cognition:   Alert/Oriented x3     IADL STATUS:   Ind Mod I Min A Mod A Max A Dep Other   Homemaking Responsibility     x     Shopping Responsibility:   x       Mode of Transportation:      x    Leisure & Hobbies:          Work:    x        Comments: Pt is unable to lift heavy items to complete house work,  her grandson and some grocery items. Pt is not driving and was not driving prior to surgery. ADL STATUS:   Ind Mod I Min A Mod A Max A Dep Other   Feeding: x         Grooming:  x        Bathing: x         UE Dressing: x         LE Dressing: x         Toileting: x         Transfers: x           Comments: Pt is only limited in her ADLs by her pain at the wrist and carpal tunnel scar.      Pain Level:    L CTR: 5-9/10, sharp pain   L TFCC: 5-10/10, burning    L cubital tunnel: 5/10, constant itching      UE Assessment:  Left Upper Extremity ROM  ELBOW Flexion 0-150* WFL       Extension 0* WFL        FOREARM Pronation 80-90* 55       Supination 80-90* 65        WRIST Flexion 0-70* 58       Extension 0-80* 60       Radial Deviation 0-20* 14       Ulnar Deviation 0-30* 17        Left Hand ROM  Composite Fist: full  Opposition from thumb to SF    Comment: Hand Dominance is right    Sensation: reports sensation in hand has improved since surgery but her pain and sensation changes can be pin pointed to the carpal tunnel scar at this time    Edema Description/Circumferential Measurements:   Mild edema present at the wrist  Dynamometer (setting 2): -to be tested next week at 8 weeks      Left: NT       Right: NT    Pinch Meter:-to be tested next week at 8 weeks    Lateral: Left= NT,Right= NT    Palmar 3 point: Left= NT, Right= NT  9 Hole Peg Test:   Left: NT    Right: NT    QuickDASH Score: 77% disability    Eval Complexity: low  Profile and History- brief chart review and patient interview  Assessment of Occupational Performance and Identification of Deficits- 6   Clinical Decision Making- none indicated at this time     Rehab Potential:                                 [x] Good  [] Fair  [] Poor        Suggested Professional Referral:       [x] No  [] Yes:  Barriers to Goal Achievement[de-identified]          [x] No  [] Yes:  Domestic Concerns:                           [x] No  [] Yes:       Patient. Education:  [x] Plans/Goals, Risks/Benefits discussed  [x] Home exercise program  Method of Education: [x] Verbal  [x] Demo  [x] Written  Comprehension of Education:  [x] Verbalizes understanding. [x] Demonstrates understanding. [] Needs Review. [x] Demonstrates/verbalizes understanding of HEP/Ed previously given. HEP: tendon glides all positions, wrist and forearm ROM ( supination, pronation, flexion, extension, radial and ulnar deviation) to be completed 3-4x a day 10 repetitions each positision. Patient understands diagnosis/prognosis and consents to treatment, plan and goals: [x] Yes    [] No     Goal Formulation: Patient  Time In: 11:15          Time Out: 11:45                            CODE  Minutes  Units   07036 OT Eval Low 30 1   67885 OT Eval Medium     88891 OT Eval High     [id number] Fluidotherapy     65459 Manual     25880 Therapeutic Ex     51781 Therapeutic Activity     29258 ADL/COMP Tech Train     63346 Neuromuscular Re-Ed     87132 OrthoManagementTraining     52156 [facility]     80779 Electrical Stim - Attended     [id number] Iontophoresis     61605 Ultrasound      Other                Electronically signed by: [address] [place] [address] OTR/L #[id number]     VVVBLESON'H Certification / Comments      Frequency/Duration 1-2 / week for 12 visits. Certification period From: 6/9/2021  To: 9/9/2021     I have reviewed the Plan of Care established for skilled therapy services and certify that the services are required and that they will be provided while the patient is under my care.      Physician's Comments/Revisions: Physicians's Printed Name:  Kody Armstrong MD                                   [de-identified] Signature:                                                               Date:      Please review Patient's OT evaluation and if you agree sign/date and fax back to us at our Located within [facility] OT Fax: [phone].  Thank you for your referral!

## 2021-06-11 DIAGNOSIS — G56.02 LEFT CARPAL TUNNEL SYNDROME: ICD-10-CM

## 2021-06-11 DIAGNOSIS — S69.81XA TFCC (TRIANGULAR FIBROCARTILAGE COMPLEX) INJURY, RIGHT, INITIAL ENCOUNTER: ICD-10-CM

## 2021-06-11 DIAGNOSIS — G89.18 POST-OP PAIN: ICD-10-CM

## 2021-06-11 NOTE — TELEPHONE ENCOUNTER
Patient is requesting a medication refill, OARRS complete. Patient is 8 weeks out from left ulnar nerve in situ decompression at elbow, left CTR, Left wrist arthroscopy with debridement, open TFCC repair, Left DRUJ stabilization with dorsal capsulodesis, left extensor carpi ulnaris tendon stabilization. Patient was last seen on 5/27 and patients next appointment is 7/8. Patient was last given Tylenol #3 every 4 hours on 6/3.

## 2021-06-14 ENCOUNTER — TREATMENT (OUTPATIENT)
Dept: OCCUPATIONAL THERAPY | Age: 41
End: 2021-06-14
Payer: MEDICAID

## 2021-06-14 DIAGNOSIS — G56.22 CUBITAL TUNNEL SYNDROME ON LEFT: ICD-10-CM

## 2021-06-14 DIAGNOSIS — G56.02 CARPAL TUNNEL SYNDROME ON LEFT: ICD-10-CM

## 2021-06-14 DIAGNOSIS — S69.81XA TFCC (TRIANGULAR FIBROCARTILAGE COMPLEX) INJURY, RIGHT, INITIAL ENCOUNTER: Primary | ICD-10-CM

## 2021-06-14 PROCEDURE — 97035 APP MDLTY 1+ULTRASOUND EA 15: CPT | Performed by: OCCUPATIONAL THERAPY ASSISTANT

## 2021-06-14 PROCEDURE — 97110 THERAPEUTIC EXERCISES: CPT | Performed by: OCCUPATIONAL THERAPY ASSISTANT

## 2021-06-14 PROCEDURE — 97022 WHIRLPOOL THERAPY: CPT | Performed by: OCCUPATIONAL THERAPY ASSISTANT

## 2021-06-14 PROCEDURE — 97140 MANUAL THERAPY 1/> REGIONS: CPT | Performed by: OCCUPATIONAL THERAPY ASSISTANT

## 2021-06-14 RX ORDER — ACETAMINOPHEN AND CODEINE PHOSPHATE 300; 30 MG/1; MG/1
1 TABLET ORAL EVERY 6 HOURS PRN
Qty: 28 TABLET | Refills: 0 | Status: SHIPPED
Start: 2021-06-14 | End: 2021-06-21 | Stop reason: SDUPTHER

## 2021-06-14 NOTE — PROGRESS NOTES
OCCUPATIONAL THERAPY PROGRESS NOTE    Date:  2021  Initial Evaluation Date: 21    Patient Name:  Ramana Ordoñez    :  1980    Restrictions/Precautions:  none, low fall risk  Diagnosis:    S69.81XA (ICD-10-CM) - TFCC (triangular fibrocartilage complex) injury, right, initial encounter   G56.02 (ICD-10-CM) - Left carpal tunnel syndrome   G56.22 (ICD-10-CM) - Cubital tunnel syndrome on left                                                                 Date of Surgery/Injury: 2021 (7 weeks)     Insurance/Certification information:  [address] [place] [address] signed (Y/N): N  Visit# / total visits:      Referring Practitioner:  Maxi Fitting    Specific Practitioner Orders:  Left wrist rom as tolerated, may begin strengthening at 8 weeks post op, modalities prn  Frequency 3x week for 6 weeks   Scar desensitization and management.     Assessment of current deficits   []? Functional mobility             []? ADLs          [x]? Strength                  []? Cognition   []? Functional transfers           []? IADLs         []? Safety Awareness  [x]? Endurance   [x]? Fine Motor Coordination    []? Balance      []? Vision/perception   [x]? Sensation     []? Gross Motor Coordination [x]? ROM           [x]? Pain                        []? Edema          []? Scar Adhesion/Skin Integrity      OT PLAN OF CARE   OT POC based on physician orders, patient diagnosis and results of clinical assessment     Frequency/Duration  1-2 / week for 12 visits. Specific OT Treatment to include:      [x]? Instruction in HEP                   Modalities:  [x]?  Therapeutic Exercise                 [x]? Ultrasound               []? Electrical Stimulation/Attended  [x]? PROM/Stretching                    [x]? Fluidotherapy          [x]?   Paraffin                   [x]? AAROM  [x]?  AROM                 []? Iontophoresis:   [x]? Sue Zavala                                       []? Neuromuscular INTERVENTION: COMPLETED: SPECIFICS/COMMENTS:   Modality:     Fluidotherapy  x10 mins Desensitization and AROM of LUE   US 7 mins 50% on 3.3. mhz, Int- 0.8   AROM:     L wrist and forearm  x -AROM- all planes  -Tendon glides  -Blue ball ex's- pronation/supination and wrist flexion/ext. - 20 reps   -jux-a-cisor        AAROM:               PROM/Stretching:     L wrist and forearm  x -PROM- all planes. Median Nerve glides x    Scar Mass/Edema Control:     Scar Massage x         Strengthening:               Other: HEP     -Tendon glides x    -Median nerve glides x    -Desensitization x Introduce different textures to scar area. -AROM ex's x      Assessment/Comments: Pt is making Fair + progress toward stated plan of care. Pt. Tolerated all exercises and   -Rehab Potential: Good  -Requires OT Follow Up: Yes  Time In: 0810           Time Out: 1940             Visit #: 2    -Approved units coverage from 6/6/2021 to 9/7/2021    CODE   Minutes  Units   41006 Fluidotherapy 10  1/12    45791 Manual 15  1/48   36034 Therapeutic Ex 23  1/48    74871 Therapeutic Activity   0/48    53216 OrthoManagementTraining    0/48   12778 Ultrasound  7 1/48      Other                     -Response to Treatment: Fair. Pt's main concern is the hypersensitivity at the scar site. Goals: Goals for pt can be see on initial eval occurring on 6/9/21    Plan:   [x]  Continues Plan of care: Focus on AROM, FMC, scar management and hypersensitivity next session. Pt education continues at each visit to obtain maximum benefits from skilled OT intervention.   []  [address] [facility] of care:   []  Discharge:      Cindy PEPE/JOHN [id number]

## 2021-06-16 ENCOUNTER — TREATMENT (OUTPATIENT)
Dept: OCCUPATIONAL THERAPY | Age: 41
End: 2021-06-16
Payer: MEDICAID

## 2021-06-16 DIAGNOSIS — S69.81XA TFCC (TRIANGULAR FIBROCARTILAGE COMPLEX) INJURY, RIGHT, INITIAL ENCOUNTER: Primary | ICD-10-CM

## 2021-06-16 DIAGNOSIS — G56.22 CUBITAL TUNNEL SYNDROME ON LEFT: ICD-10-CM

## 2021-06-16 DIAGNOSIS — G56.02 CARPAL TUNNEL SYNDROME ON LEFT: ICD-10-CM

## 2021-06-16 PROCEDURE — 97110 THERAPEUTIC EXERCISES: CPT | Performed by: OCCUPATIONAL THERAPY ASSISTANT

## 2021-06-16 PROCEDURE — 97022 WHIRLPOOL THERAPY: CPT | Performed by: OCCUPATIONAL THERAPY ASSISTANT

## 2021-06-16 PROCEDURE — 97035 APP MDLTY 1+ULTRASOUND EA 15: CPT | Performed by: OCCUPATIONAL THERAPY ASSISTANT

## 2021-06-16 PROCEDURE — 97140 MANUAL THERAPY 1/> REGIONS: CPT | Performed by: OCCUPATIONAL THERAPY ASSISTANT

## 2021-06-16 NOTE — PROGRESS NOTES
OCCUPATIONAL THERAPY PROGRESS NOTE    Date:  2021  Initial Evaluation Date: 21    Patient Name:  Katerina Gould    :  1980    Restrictions/Precautions:  none, low fall risk  Diagnosis:    S69.81XA (ICD-10-CM) - TFCC (triangular fibrocartilage complex) injury, right, initial encounter   G56.02 (ICD-10-CM) - Left carpal tunnel syndrome   G56.22 (ICD-10-CM) - Cubital tunnel syndrome on left                                                                 Date of Surgery/Injury: 2021 (7 weeks)     Insurance/Certification information:  [address] [place] [address] signed (Y/N): N  Visit# / total visits: 3/ 12     Referring Practitioner:  Tory He    Specific Practitioner Orders:  Left wrist rom as tolerated, may begin strengthening at 8 weeks post op, modalities prn  Frequency 3x week for 6 weeks   Scar desensitization and management.     Assessment of current deficits   []? Functional mobility             []? ADLs          [x]? Strength                  []? Cognition   []? Functional transfers           []? IADLs         []? Safety Awareness  [x]? Endurance   [x]? Fine Motor Coordination    []? Balance      []? Vision/perception   [x]? Sensation     []? Gross Motor Coordination [x]? ROM           [x]? Pain                        []? Edema          []? Scar Adhesion/Skin Integrity      OT PLAN OF CARE   OT POC based on physician orders, patient diagnosis and results of clinical assessment     Frequency/Duration  1-2 / week for 12 visits. Specific OT Treatment to include:      [x]? Instruction in HEP                   Modalities:  [x]?  Therapeutic Exercise                 [x]? Ultrasound               []? Electrical Stimulation/Attended  [x]? PROM/Stretching                    [x]? Fluidotherapy          [x]?   Paraffin                   [x]? AAROM  [x]?  AROM                 []? Iontophoresis:   [x]? Gae Michaela                                       []? Neuromuscular Re-Ed            []? ADL/IADL re-training    [x]?  Therapeutic Activity                  [x]? Pain Management with/without modalities PRN                 [x]?  Manual Therapy                      [x]? Splinting                                   []? Scar Management                   []?Joint Protection/Training  []? Ergonomics                             []? Joint Mobilization                      []? Adaptive Equipment Assessment/Training                             []? Manual Edema Mobilization   []? Myofascial Release                 []? Energy Conservation/Work Simplification  [x]? GM/FM Coordination                []? Safety retraining/education per  individual diagnosis/goals  [x]? Desensitization        Patient Specific Goal: To be able to lift her grandson up to change him without pain                             GOALS (Long term same as Short term):  1) Patient will demonstrate good understanding of home program (exercises/activities/diagnosis/prognosis/goals) with good accuracy. 2) Patient will demonstrate increased active/passive range of motion of their L to [facility] for ADL/IADL completion. 3) Patient will demonstrate increased /pinch strength of at least 10 / 5 pinch pounds of their L hand. 4) Patient to report decreased pain in their affected L upper extremity from 9/10 to 4/10 or less with resistive functional use. 5) Increase in fine motor function as evidenced by decreased time to complete 9-hole peg test and/or MRMT test by at least 5-10 seconds. 6) Patient to report 100% compliance with their splint wear, care, and precautions if needed. 7) Patient will demonstrate a non-tender/non-adherent scar. 8) Patient will decrease QuickDASH score to 25% or less for increased participation in daily functional activities. Pain Level: 6-7 on scale of 1-10, burning and shooting    Subjective: \"My hand is hurting more then usual today.  I don't know if I slept on it wrong\"      Objective: Updated POC to be completed by 10th visit     INTERVENTION: COMPLETED: SPECIFICS/COMMENTS:   Modality:     Fluidotherapy  x10 mins Desensitization and AROM of LUE   US 7 mins 50% on 3.3. mhz, Int- 0.8   AROM:     L wrist, forearm, and hand X  X  X  X  X  x -AROM- all planes  -Tendon glides  -Blue ball ex's- pronation/supination and wrist flexion/ext. - 20 reps   -jux-a-cisor  -Green may bar- pronation/supination, RD/UD   -Towel task   -exercise spheres. AAROM:               PROM/Stretching:     L wrist and forearm  x -PROM- all planes. Median Nerve glides x    Scar Mass/Edema Control:     Scar Massage x         Strengthening:               Other: HEP     -Tendon glides x    -Median nerve glides x    -Desensitization x Introduce different textures to scar area. -AROM ex's x      Assessment/Comments: Pt is making Fair + progress toward stated plan of care. Pt. Tolerated all exercises and stretches today. Pt. Complained of a popping at times with supination. Will monitor during future treatments. Will continue to progress as tolerated. -Rehab Potential: Good  -Requires OT Follow Up: Yes  Time In: 1105           Time Out: 1200             Visit #:3    -Approved units coverage from 6/6/2021 to 9/7/2021    CODE   Minutes  Units   18199 Fluidotherapy 10  2/12    19337 Manual 15  2/48   52312 Therapeutic Ex 23  2/48    53884 Therapeutic Activity   0/48    66067 OrthoManagementTraining    0/48   58416 Ultrasound  7 2/48      Other                     -Response to Treatment: Fair. Pt's main concern is the hypersensitivity at the scar site. Goals: Goals for pt can be see on initial eval occurring on 6/9/21    Plan:   [x]  Continues Plan of care: Focus on AROM, FMC, scar management and hypersensitivity next session. Pt education continues at each visit to obtain maximum benefits from skilled OT intervention.   []  [address] of care:   []  Discharge:      Nate Decree PEPE/L [address]

## 2021-06-21 ENCOUNTER — TREATMENT (OUTPATIENT)
Dept: OCCUPATIONAL THERAPY | Age: 41
End: 2021-06-21
Payer: MEDICAID

## 2021-06-21 DIAGNOSIS — G89.18 POST-OP PAIN: ICD-10-CM

## 2021-06-21 DIAGNOSIS — S69.81XA TFCC (TRIANGULAR FIBROCARTILAGE COMPLEX) INJURY, RIGHT, INITIAL ENCOUNTER: Primary | ICD-10-CM

## 2021-06-21 DIAGNOSIS — G56.02 LEFT CARPAL TUNNEL SYNDROME: ICD-10-CM

## 2021-06-21 DIAGNOSIS — G56.22 CUBITAL TUNNEL SYNDROME ON LEFT: ICD-10-CM

## 2021-06-21 DIAGNOSIS — S69.81XA TFCC (TRIANGULAR FIBROCARTILAGE COMPLEX) INJURY, RIGHT, INITIAL ENCOUNTER: ICD-10-CM

## 2021-06-21 DIAGNOSIS — G56.02 CARPAL TUNNEL SYNDROME ON LEFT: ICD-10-CM

## 2021-06-21 PROCEDURE — 97110 THERAPEUTIC EXERCISES: CPT | Performed by: OCCUPATIONAL THERAPY ASSISTANT

## 2021-06-21 PROCEDURE — 97530 THERAPEUTIC ACTIVITIES: CPT | Performed by: OCCUPATIONAL THERAPY ASSISTANT

## 2021-06-21 PROCEDURE — 97140 MANUAL THERAPY 1/> REGIONS: CPT | Performed by: OCCUPATIONAL THERAPY ASSISTANT

## 2021-06-21 PROCEDURE — 97022 WHIRLPOOL THERAPY: CPT | Performed by: OCCUPATIONAL THERAPY ASSISTANT

## 2021-06-21 RX ORDER — ACETAMINOPHEN AND CODEINE PHOSPHATE 300; 30 MG/1; MG/1
1 TABLET ORAL EVERY 6 HOURS PRN
Qty: 28 TABLET | Refills: 0 | Status: SHIPPED | OUTPATIENT
Start: 2021-06-21 | End: 2021-06-28

## 2021-06-21 NOTE — PROGRESS NOTES
OCCUPATIONAL THERAPY PROGRESS NOTE    Date:  2021  Initial Evaluation Date: 21    Patient Name:  Josh Nunez    :  1980    Restrictions/Precautions:  none, low fall risk  Diagnosis:    S69.81XA (ICD-10-CM) - TFCC (triangular fibrocartilage complex) injury, right, initial encounter   G56.02 (ICD-10-CM) - Left carpal tunnel syndrome   G56.22 (ICD-10-CM) - Cubital tunnel syndrome on left                                                                 Date of Surgery/Injury: 2021 (7 weeks)     Insurance/Certification information:  [employer] of care signed (Y/N): N  Visit# / total visits:      Referring Practitioner:  Marta Mirza    Specific Practitioner Orders:  Left wrist rom as tolerated, may begin strengthening at 8 weeks post op, modalities prn  Frequency 3x week for 6 weeks   Scar desensitization and management.     Assessment of current deficits   []? Functional mobility             []? ADLs          [x]? Strength                  []? Cognition   []? Functional transfers           []? IADLs         []? Safety Awareness  [x]? Endurance   [x]? Fine Motor Coordination    []? Balance      []? Vision/perception   [x]? Sensation     []? Gross Motor Coordination [x]? ROM           [x]? Pain                        []? Edema          []? Scar Adhesion/Skin Integrity      OT PLAN OF CARE   OT POC based on physician orders, patient diagnosis and results of clinical assessment     Frequency/Duration  1-2 / week for 12 visits. Specific OT Treatment to include:      [x]? Instruction in HEP                   Modalities:  [x]?  Therapeutic Exercise                 [x]? Ultrasound               []? Electrical Stimulation/Attended  [x]? PROM/Stretching                    [x]? Fluidotherapy          [x]?   Paraffin                   [x]? AAROM  [x]?  AROM                 []? Iontophoresis:   [x]? Ivory Yost                                       []? Neuromuscular completed by 10th visit     INTERVENTION: COMPLETED: SPECIFICS/COMMENTS:   Modality:     Fluidotherapy  x10 mins Desensitization and AROM of LUE   US 7 mins 50% on 3.3. mhz, Int- 0.8   AROM:     L wrist, forearm, and hand X  X  X  X  X  X  x -AROM- all planes  -Tendon glides  -Blue ball ex's- pronation/supination and wrist flexion/ext. - 20 reps   -jux-a-cisor  -Green may bar- pronation/supination, RD/UD   -Towel task   -exercise spheres. AAROM:               PROM/Stretching:     L wrist and forearm  x -PROM- all planes. Median Nerve glides x    Scar Mass/Edema Control:     Scar Massage x         Strengthening:               Other: HEP     -Tendon glides x    -Median nerve glides x    -Desensitization x Introduce different textures to scar area. -AROM ex's x      Assessment/Comments: Pt is making Fair + progress toward stated plan of care. Pt. Tolerated all exercises and stretches today. Will initiate light hand strengthening next session and advance as tolerated. -Rehab Potential: Good  -Requires OT Follow Up: Yes  Time In: 1105           Time Out: 1200             Visit #:4    -Approved units coverage from 6/6/2021 to 9/7/2021    CODE   Minutes  Units   07189 Fluidotherapy 10  3/12    78660 Manual 15 3/48   79028 Therapeutic Ex 20  3/48    91701 Therapeutic Activity  10 1/48    01406 OrthoManagementTraining    0/48   46867 Ultrasound  2/48      Other                     -Response to Treatment: Fair. Pt's main concern is the hypersensitivity at the scar site. Goals: Goals for pt can be see on initial eval occurring on 6/9/21    Plan:   [x]  Continues Plan of care: Focus on AROM, FMC, scar management and hypersensitivity next session. Pt education continues at each visit to obtain maximum benefits from skilled OT intervention.   []  [address] of care:   []  Discharge:      Lindsay PEPE/JOHN [id number]

## 2021-06-21 NOTE — TELEPHONE ENCOUNTER
Patient is requesting a medication refill, OARRS complete. Patient is 9 weeks out from left ulnar nerve in situ decompression at elbow, left CTR, Left wrist arthroscopy with debridement, open TFCC repair, Left DRUJ stabilization with dorsal capsulodesis, left extensor carpi ulnaris tendon stabilization. Patient was last seen on 5/27 and patients next appointment is 7/8. Patient was last given Tylenol #3 every 4 hours on 6/3.

## 2021-06-23 ENCOUNTER — TREATMENT (OUTPATIENT)
Dept: OCCUPATIONAL THERAPY | Age: 41
End: 2021-06-23
Payer: MEDICAID

## 2021-06-23 DIAGNOSIS — S69.81XA TFCC (TRIANGULAR FIBROCARTILAGE COMPLEX) INJURY, RIGHT, INITIAL ENCOUNTER: Primary | ICD-10-CM

## 2021-06-23 DIAGNOSIS — G56.22 CUBITAL TUNNEL SYNDROME ON LEFT: ICD-10-CM

## 2021-06-23 DIAGNOSIS — G56.02 CARPAL TUNNEL SYNDROME ON LEFT: ICD-10-CM

## 2021-06-23 PROCEDURE — 97110 THERAPEUTIC EXERCISES: CPT | Performed by: OCCUPATIONAL THERAPIST

## 2021-06-23 PROCEDURE — 97530 THERAPEUTIC ACTIVITIES: CPT | Performed by: OCCUPATIONAL THERAPIST

## 2021-06-23 PROCEDURE — 97140 MANUAL THERAPY 1/> REGIONS: CPT | Performed by: OCCUPATIONAL THERAPIST

## 2021-06-23 PROCEDURE — 97022 WHIRLPOOL THERAPY: CPT | Performed by: OCCUPATIONAL THERAPIST

## 2021-06-23 NOTE — PROGRESS NOTES
OCCUPATIONAL THERAPY PROGRESS NOTE    Date:  2021  Initial Evaluation Date: 21    Patient Name:  Landen [place]    :  1980    Restrictions/Precautions:  none, low fall risk  Diagnosis:    S69.81XA (ICD-10-CM) - TFCC (triangular fibrocartilage complex) injury, right, initial encounter   G56.02 (ICD-10-CM) - Left carpal tunnel syndrome   G56.22 (ICD-10-CM) - Cubital tunnel syndrome on left                                                                 Date of Surgery/Injury: 2021 (9 weeks)     Insurance/Certification information:  [address] [place] [address] signed (Y/N): N  Visit# / total visits:      Referring Practitioner:  Merlin Louis    Specific Practitioner Orders:  Left wrist rom as tolerated, may begin strengthening at 8 weeks post op, modalities prn  Frequency 3x week for 6 weeks   Scar desensitization and management.     Assessment of current deficits   []? Functional mobility             []? ADLs          [x]? Strength                  []? Cognition   []? Functional transfers           []? IADLs         []? Safety Awareness  [x]? Endurance   [x]? Fine Motor Coordination    []? Balance      []? Vision/perception   [x]? Sensation     []? Gross Motor Coordination [x]? ROM           [x]? Pain                        []? Edema          []? Scar Adhesion/Skin Integrity      OT PLAN OF CARE   OT POC based on physician orders, patient diagnosis and results of clinical assessment     Frequency/Duration  1-2 / week for 12 visits. Specific OT Treatment to include:      [x]? Instruction in HEP                   Modalities:  [x]?  Therapeutic Exercise                 [x]? Ultrasound               []? Electrical Stimulation/Attended  [x]? PROM/Stretching                    [x]? Fluidotherapy          [x]?   Paraffin                   [x]? AAROM  [x]?  AROM                 []? Iontophoresis:   [x]? Jana Yan                                       []? Neuromuscular Re-Ed            []? ADL/IADL re-training    [x]?  Therapeutic Activity                  [x]? Pain Management with/without modalities PRN                 [x]?  Manual Therapy                      [x]? Splinting                                   []? Scar Management                   []?Joint Protection/Training  []? Ergonomics                             []? Joint Mobilization                      []? Adaptive Equipment Assessment/Training                             []? Manual Edema Mobilization   []? Myofascial Release                 []? Energy Conservation/Work Simplification  [x]? GM/FM Coordination                []? Safety retraining/education per  individual diagnosis/goals  [x]? Desensitization        Patient Specific Goal: To be able to lift her grandson up to change him without pain                             GOALS (Long term same as Short term):  1) Patient will demonstrate good understanding of home program (exercises/activities/diagnosis/prognosis/goals) with good accuracy. 2) Patient will demonstrate increased active/passive range of motion of their L to [facility] for ADL/IADL completion. 3) Patient will demonstrate increased /pinch strength of at least 10 / 5 pinch pounds of their L hand. 4) Patient to report decreased pain in their affected L upper extremity from 9/10 to 4/10 or less with resistive functional use. 5) Increase in fine motor function as evidenced by decreased time to complete 9-hole peg test and/or MRMT test by at least 5-10 seconds. 6) Patient to report 100% compliance with their splint wear, care, and precautions if needed. 7) Patient will demonstrate a non-tender/non-adherent scar. 8) Patient will decrease QuickDASH score to 25% or less for increased participation in daily functional activities.      Pain Level: 6 on scale of 1-10, burning and shooting     Subjective Pt states \"my scars are still really sensitive\"      Objective:  Updated POC to be completed by 10th visit     INTERVENTION: COMPLETED: SPECIFICS/COMMENTS:   Modality:     Fluidotherapy  x10 mins Desensitization and AROM of LUE   US 7 mins 50% on 3.3. mhz, Int- 0.8   AROM:     L wrist, forearm, and hand X  X    X      x -AROM- all planes  -Tendon glides  -Blue ball ex's- pronation/supination and wrist flexion/ext. - 20 reps   -[id number]-8 repetitions  -Green may bar- pronation/supination, RD/UD   -Towel task   -exercise spheres. AAROM:               PROM/Stretching:     L wrist and forearm  x -PROM- all planes. Median Nerve glides     Scar Mass/Edema Control:     Scar Massage x         Strengthening:     Digitial strengthening x -light resistive putty-alternating fingers, gross gripping, manipulation of pennies out of putty          Other: HEP     -Tendon glides x    -Median nerve glides x    -Desensitization x Introduce different textures to scar area. -AROM ex's x      Assessment/Comments: Pt is making Fair + progress toward stated plan of care. Strength and fine motor coordination tested this date. Pt progressing well with minimal pain indicated with ROM and strengthening activities this date. Strengthening initiated this date-pt tolerated well.      Dynamometer (setting 2): -to be tested next week at 8 weeks                                 Left: NT, 20# (6/23/21)                                               Right: NT, 65# (6/23/21)                           Pinch Meter:-to be tested next week at 8 weeks               Lateral: Left= NT, 7# (6/23/21)     Right= NT, 14# (6/23/21)                    Palmar 3 point: Left= NT, 6# (6/23/21)      Right= NT, 13# (6/23/21)       9 Hole Peg Test:              Left: NT, 23seconds (6/23/21)                    Right: NT, 16 seconds (6/23/21)         -Rehab Potential: Good  -Requires OT Follow Up: Yes  Time In: 1105           Time Out: 1200             Visit #:5    -Approved units coverage from 6/6/2021 to 9/7/2021    CODE   Minutes  Units   [id number]

## 2021-06-28 ENCOUNTER — TREATMENT (OUTPATIENT)
Dept: OCCUPATIONAL THERAPY | Age: 41
End: 2021-06-28
Payer: MEDICAID

## 2021-06-28 DIAGNOSIS — G56.22 CUBITAL TUNNEL SYNDROME ON LEFT: ICD-10-CM

## 2021-06-28 DIAGNOSIS — G56.02 CARPAL TUNNEL SYNDROME ON LEFT: ICD-10-CM

## 2021-06-28 DIAGNOSIS — S69.81XA TFCC (TRIANGULAR FIBROCARTILAGE COMPLEX) INJURY, RIGHT, INITIAL ENCOUNTER: Primary | ICD-10-CM

## 2021-06-28 PROCEDURE — 97140 MANUAL THERAPY 1/> REGIONS: CPT | Performed by: OCCUPATIONAL THERAPY ASSISTANT

## 2021-06-28 PROCEDURE — 97022 WHIRLPOOL THERAPY: CPT | Performed by: OCCUPATIONAL THERAPY ASSISTANT

## 2021-06-28 PROCEDURE — 97110 THERAPEUTIC EXERCISES: CPT | Performed by: OCCUPATIONAL THERAPY ASSISTANT

## 2021-06-28 PROCEDURE — 97530 THERAPEUTIC ACTIVITIES: CPT | Performed by: OCCUPATIONAL THERAPY ASSISTANT

## 2021-06-28 NOTE — PROGRESS NOTES
OCCUPATIONAL THERAPY PROGRESS NOTE    Date:  2021  Initial Evaluation Date: 21    Patient Name:  Gil Pollard    :  1980    Restrictions/Precautions:  none, low fall risk  Diagnosis:    S69.81XA (ICD-10-CM) - TFCC (triangular fibrocartilage complex) injury, right, initial encounter   G56.02 (ICD-10-CM) - Left carpal tunnel syndrome   G56.22 (ICD-10-CM) - Cubital tunnel syndrome on left                                                                 Date of Surgery/Injury: 2021 (9 weeks)     Insurance/Certification information:  [address] [place] [address] signed (Y/N): N  Visit# / total visits:      Referring Practitioner:  Laverna Crigler    Specific Practitioner Orders:  Left wrist rom as tolerated, may begin strengthening at 8 weeks post op, modalities prn  Frequency 3x week for 6 weeks   Scar desensitization and management.     Assessment of current deficits   []? Functional mobility             []? ADLs          [x]? Strength                  []? Cognition   []? Functional transfers           []? IADLs         []? Safety Awareness  [x]? Endurance   [x]? Fine Motor Coordination    []? Balance      []? Vision/perception   [x]? Sensation     []? Gross Motor Coordination [x]? ROM           [x]? Pain                        []? Edema          []? Scar Adhesion/Skin Integrity      OT PLAN OF CARE   OT POC based on physician orders, patient diagnosis and results of clinical assessment     Frequency/Duration  1-2 / week for 12 visits. Specific OT Treatment to include:      [x]? Instruction in HEP                   Modalities:  [x]?  Therapeutic Exercise                 [x]? Ultrasound               []? Electrical Stimulation/Attended  [x]? PROM/Stretching                    [x]? Fluidotherapy          [x]?   Paraffin                   [x]? AAROM  [x]?  AROM                 []? Iontophoresis:   [x]? Sari Bradford                                       []? Neuromuscular Re-Ed            []? ADL/IADL re-training    [x]?  Therapeutic Activity                  [x]? Pain Management with/without modalities PRN                 [x]?  Manual Therapy                      [x]? Splinting                                   []? Scar Management                   []?Joint Protection/Training  []? Ergonomics                             []? Joint Mobilization                      []? Adaptive Equipment Assessment/Training                             []? Manual Edema Mobilization   []? Myofascial Release                 []? Energy Conservation/Work Simplification  [x]? GM/FM Coordination                []? Safety retraining/education per  individual diagnosis/goals  [x]? Desensitization        Patient Specific Goal: To be able to lift her grandson up to change him without pain                             GOALS (Long term same as Short term):  1) Patient will demonstrate good understanding of home program (exercises/activities/diagnosis/prognosis/goals) with good accuracy. 2) Patient will demonstrate increased active/passive range of motion of their L to [facility] for ADL/IADL completion. 3) Patient will demonstrate increased /pinch strength of at least 10 / 5 pinch pounds of their L hand. 4) Patient to report decreased pain in their affected L upper extremity from 9/10 to 4/10 or less with resistive functional use. 5) Increase in fine motor function as evidenced by decreased time to complete 9-hole peg test and/or MRMT test by at least 5-10 seconds. 6) Patient to report 100% compliance with their splint wear, care, and precautions if needed. 7) Patient will demonstrate a non-tender/non-adherent scar. 8) Patient will decrease QuickDASH score to 25% or less for increased participation in daily functional activities. Pain Level: 5-6 on scale of 1-10, burning and shooting pain in base of hand.        Subjective Pt states \"The pain in the base of my hand is really bad\"      Objective: Updated POC to be completed by 10th visit     INTERVENTION: COMPLETED: SPECIFICS/COMMENTS:   Modality:     Fluidotherapy  x10 mins Desensitization and AROM of LUE   US 7 mins 50% on 3.3. mhz, Int- 0.8   AROM:     L wrist, forearm, and hand X  X  x  x      x -AROM- all planes  -Tendon glides  -Blue ball ex's- pronation/supination and wrist flexion/ext. - 20 reps   -fqf-f-[id number]-8 repetitions  -Green may bar- pronation/supination, RD/UD   -Towel task   -exercise spheres. AAROM:               PROM/Stretching:     L wrist and forearm  x -PROM- all planes. ULTT x Median nerve   Median Nerve glides x    Scar Mass/Edema Control:     Scar Massage x         Strengthening:     Digitial strengthening x -light resistive putty-alternating fingers, gross gripping, manipulation of pennies out of putty          Other: HEP     -Yellow (soft)  theraputty      -Tendon glides x    -Median nerve glides x    -Desensitization x Introduce different textures to scar area. -AROM ex's x      Assessment/Comments: Pt is making Fair + progress toward stated plan of care. Pt. Tolerated all exercises and stretches. Issue Yellow theraputty for light hand strengthening at home. Will continue to advance as tolerated. -Rehab Potential: Good  -Requires OT Follow Up: Yes  Time In: 1000           Time Out: 1050             Visit #:6    -Approved units coverage from 6/6/2021 to 9/7/2021    CODE   Minutes  Units   35877 Fluidotherapy 10  5/12    27449 Manual 10 5/48   84297 Therapeutic Ex 15 5/48    91319 Therapeutic Activity 15 3/48    21456 OrthoManagementTraining    0/48   32584 Ultrasound  2/48      Other                     -Response to Treatment: Fair. Pt's main concern is the hypersensitivity at the scar site. Goals: Goals for pt can be see on initial eval occurring on 6/9/21    Plan:   [x]  Continues Plan of care: Focus on AROM, FMC, scar management and hypersensitivity next session.  Pt education continues at each visit to obtain maximum benefits from skilled OT intervention.   []  [address] of care:   []  Discharge:      [facility] [id number]

## 2021-06-30 ENCOUNTER — TREATMENT (OUTPATIENT)
Dept: OCCUPATIONAL THERAPY | Age: 41
End: 2021-06-30
Payer: MEDICAID

## 2021-06-30 DIAGNOSIS — G56.02 CARPAL TUNNEL SYNDROME ON LEFT: ICD-10-CM

## 2021-06-30 DIAGNOSIS — G89.18 POST-OP PAIN: Primary | ICD-10-CM

## 2021-06-30 DIAGNOSIS — S69.81XA TFCC (TRIANGULAR FIBROCARTILAGE COMPLEX) INJURY, RIGHT, INITIAL ENCOUNTER: Primary | ICD-10-CM

## 2021-06-30 DIAGNOSIS — G56.22 CUBITAL TUNNEL SYNDROME ON LEFT: ICD-10-CM

## 2021-06-30 PROCEDURE — 97110 THERAPEUTIC EXERCISES: CPT | Performed by: OCCUPATIONAL THERAPIST

## 2021-06-30 PROCEDURE — 97530 THERAPEUTIC ACTIVITIES: CPT | Performed by: OCCUPATIONAL THERAPIST

## 2021-06-30 PROCEDURE — 97140 MANUAL THERAPY 1/> REGIONS: CPT | Performed by: OCCUPATIONAL THERAPIST

## 2021-06-30 RX ORDER — TRAMADOL HYDROCHLORIDE 50 MG/1
50 TABLET ORAL EVERY 6 HOURS PRN
Qty: 28 TABLET | Refills: 0 | Status: SHIPPED
Start: 2021-06-30 | End: 2021-07-08 | Stop reason: SDUPTHER

## 2021-06-30 NOTE — TELEPHONE ENCOUNTER
Patient is requesting a medication refill, OARRS complete. Patient is 10.5 weeks out from left ulnar nerve in situ decompression at elbow, left CTR, Left wrist arthroscopy with debridement, open TFCC repair, Left DRUJ stabilization with dorsal capsulodesis, left extensor carpi ulnaris tendon stabilization. Patient was last seen on 5/27 and patients next appointment is 7/8.  Patient was last given Tylenol #3 every 4 hours on 6/21.

## 2021-06-30 NOTE — PROGRESS NOTES
OCCUPATIONAL THERAPY PROGRESS NOTE    Date:  2021  Initial Evaluation Date: 21    Patient Name:  Sudhir Duncan    :  1980    Restrictions/Precautions:  none, low fall risk  Diagnosis:    S69.81XA (ICD-10-CM) - TFCC (triangular fibrocartilage complex) injury, right, initial encounter   G56.02 (ICD-10-CM) - Left carpal tunnel syndrome   G56.22 (ICD-10-CM) - Cubital tunnel syndrome on left                                                                 Date of Surgery/Injury: 2021 (9 weeks)     Insurance/Certification information:  [address] [place] [address] signed (Y/N): N  Visit# / total visits:      Referring Practitioner:  Caterina Bruce    Specific Practitioner Orders:  Left wrist rom as tolerated, may begin strengthening at 8 weeks post op, modalities prn  Frequency 3x week for 6 weeks   Scar desensitization and management.     Assessment of current deficits   []? Functional mobility             []? ADLs          [x]? Strength                  []? Cognition   []? Functional transfers           []? IADLs         []? Safety Awareness  [x]? Endurance   [x]? Fine Motor Coordination    []? Balance      []? Vision/perception   [x]? Sensation     []? Gross Motor Coordination [x]? ROM           [x]? Pain                        []? Edema          []? Scar Adhesion/Skin Integrity      OT PLAN OF CARE   OT POC based on physician orders, patient diagnosis and results of clinical assessment     Frequency/Duration  1-2 / week for 12 visits. Specific OT Treatment to include:      [x]? Instruction in HEP                   Modalities:  [x]?  Therapeutic Exercise                 [x]? Ultrasound               []? Electrical Stimulation/Attended  [x]? PROM/Stretching                    [x]? Fluidotherapy          [x]?   Paraffin                   [x]? AAROM  [x]?  AROM                 []? Iontophoresis:   [x]? Sallye Can                                       []? Neuromuscular Re-Ed            []? ADL/IADL re-training    [x]?  Therapeutic Activity                  [x]? Pain Management with/without modalities PRN                 [x]?  Manual Therapy                      [x]? Splinting                                   []? Scar Management                   []?Joint Protection/Training  []? Ergonomics                             []? Joint Mobilization                      []? Adaptive Equipment Assessment/Training                             []? Manual Edema Mobilization   []? Myofascial Release                 []? Energy Conservation/Work Simplification  [x]? GM/FM Coordination                []? Safety retraining/education per  individual diagnosis/goals  [x]? Desensitization        Patient Specific Goal: To be able to lift her grandson up to change him without pain                             GOALS (Long term same as Short term):  1) Patient will demonstrate good understanding of home program (exercises/activities/diagnosis/prognosis/goals) with good accuracy. 2) Patient will demonstrate increased active/passive range of motion of their L to [facility] for ADL/IADL completion. 3) Patient will demonstrate increased /pinch strength of at least 10 / 5 pinch pounds of their L hand. 4) Patient to report decreased pain in their affected L upper extremity from 9/10 to 4/10 or less with resistive functional use. 5) Increase in fine motor function as evidenced by decreased time to complete 9-hole peg test and/or MRMT test by at least 5-10 seconds. 6) Patient to report 100% compliance with their splint wear, care, and precautions if needed. 7) Patient will demonstrate a non-tender/non-adherent scar. 8) Patient will decrease QuickDASH score to 25% or less for increased participation in daily functional activities. Pain Level: 8 on scale of 1-10, burning and shooting pain in base of hand.        Subjective Pt states \"My wrist is killing me today\"      Objective:  Updated POC to be completed by 10th visit     INTERVENTION: COMPLETED: SPECIFICS/COMMENTS:   Modality:     Fluidotherapy  10 mins Desensitization and AROM of LUE   US x7 mins 50% on 3.3. mhz, Int- 0.8   AROM:     L wrist, forearm, and hand X  X           -AROM- all planes  -Tendon glides  -Blue ball ex's- pronation/supination and wrist flexion/ext. - 20 reps   -[id number]-8 repetitions  -Green may bar- pronation/supination, RD/UD   -Towel task   -exercise spheres. AAROM:               PROM/Stretching:     L wrist and forearm  x -PROM- all planes. ULTT x Median nerve   Median Nerve glides x    Scar Mass/Edema Control:     Scar Massage x         Strengthening:     Digitial strengthening  -light resistive putty-alternating fingers, gross gripping, manipulation of pennies out of putty          Other: HEP     -Yellow (soft)  theraputty      -Tendon glides x    -Median nerve glides x    -Desensitization x Introduce different textures to scar area. -AROM ex's x      Assessment/Comments: Pt is making Fair + progress toward stated plan of care. Pt reports increased pain the last 3 days, primary focus of session this date was to decrease pain. Issued Tubigrip and silicone gel pad for carpal tunnel scar to decrease pain with increased compression. Will continue to advance as tolerated. -Rehab Potential: Good  -Requires OT Follow Up: Yes  Time In: 1000           Time Out: 1055            Visit #:7    -Approved units coverage from 6/6/2021 to 9/7/2021    CODE   Minutes  Units   17699 Fluidotherapy  5/12    98829 Manual 25 7/48   74336 Therapeutic Ex 15 6/48    73163 Therapeutic Activity 15 6/48    33017 OrthoManagementTraining    0/48   02626 Ultrasound  2/48      Other                     -Response to Treatment: [address]. Pt's main concern is the hypersensitivity at the scar site. Goals: Goals for pt can be see on initial eval occurring on 6/9/21    Plan:   [x]  Continues Plan of care:  Focus on AROM, FMC, scar management and hypersensitivity next session. Pt education continues at each visit to obtain maximum benefits from skilled OT intervention. []  Alter Plan of care:   []  Discharge:       [facility], OTR/L #[id number]

## 2021-07-08 ENCOUNTER — OFFICE VISIT (OUTPATIENT)
Dept: ORTHOPEDIC SURGERY | Age: 41
End: 2021-07-08

## 2021-07-08 VITALS — HEIGHT: 61 IN | RESPIRATION RATE: 20 BRPM | WEIGHT: 210 LBS | BODY MASS INDEX: 39.65 KG/M2

## 2021-07-08 DIAGNOSIS — G89.18 POST-OP PAIN: ICD-10-CM

## 2021-07-08 PROCEDURE — 99024 POSTOP FOLLOW-UP VISIT: CPT | Performed by: ORTHOPAEDIC SURGERY

## 2021-07-08 RX ORDER — IBUPROFEN 800 MG/1
800 TABLET ORAL 3 TIMES DAILY PRN
Qty: 90 TABLET | Refills: 0 | Status: SHIPPED | OUTPATIENT
Start: 2021-07-08

## 2021-07-08 RX ORDER — ACETAMINOPHEN AND CODEINE PHOSPHATE 300; 30 MG/1; MG/1
1 TABLET ORAL EVERY 4 HOURS PRN
Qty: 42 TABLET | Refills: 0 | Status: SHIPPED
Start: 2021-07-08 | End: 2021-07-22 | Stop reason: SDUPTHER

## 2021-07-08 RX ORDER — METHYLPREDNISOLONE 4 MG/1
TABLET ORAL
Qty: 1 KIT | Refills: 0 | Status: SHIPPED | OUTPATIENT
Start: 2021-07-08 | End: 2021-07-14

## 2021-07-08 RX ORDER — TRAMADOL HYDROCHLORIDE 50 MG/1
50 TABLET ORAL EVERY 6 HOURS PRN
Qty: 28 TABLET | Refills: 0 | Status: SHIPPED
Start: 2021-07-08 | End: 2021-07-08 | Stop reason: CLARIF

## 2021-07-09 ENCOUNTER — TELEPHONE (OUTPATIENT)
Dept: OCCUPATIONAL THERAPY | Age: 41
End: 2021-07-09

## 2021-07-09 NOTE — TELEPHONE ENCOUNTER
Patient called OT today. Pt states she has had a set back. Pt is on hold from therapy x 2 weeks per the MD. Pt will call when she is able to resume care.

## 2021-07-22 ENCOUNTER — OFFICE VISIT (OUTPATIENT)
Dept: ORTHOPEDIC SURGERY | Age: 41
End: 2021-07-22
Payer: MEDICAID

## 2021-07-22 VITALS — RESPIRATION RATE: 20 BRPM | WEIGHT: 214 LBS | HEIGHT: 61 IN | BODY MASS INDEX: 40.4 KG/M2

## 2021-07-22 DIAGNOSIS — G89.18 POST-OP PAIN: ICD-10-CM

## 2021-07-22 PROCEDURE — 99212 OFFICE O/P EST SF 10 MIN: CPT | Performed by: ORTHOPAEDIC SURGERY

## 2021-07-22 PROCEDURE — G8427 DOCREV CUR MEDS BY ELIG CLIN: HCPCS | Performed by: ORTHOPAEDIC SURGERY

## 2021-07-22 PROCEDURE — 4004F PT TOBACCO SCREEN RCVD TLK: CPT | Performed by: ORTHOPAEDIC SURGERY

## 2021-07-22 PROCEDURE — G8417 CALC BMI ABV UP PARAM F/U: HCPCS | Performed by: ORTHOPAEDIC SURGERY

## 2021-07-22 RX ORDER — ACETAMINOPHEN AND CODEINE PHOSPHATE 300; 30 MG/1; MG/1
1 TABLET ORAL EVERY 4 HOURS PRN
Qty: 42 TABLET | Refills: 0 | Status: SHIPPED | OUTPATIENT
Start: 2021-07-22 | End: 2021-07-29

## 2021-07-22 NOTE — PROGRESS NOTES
Total Hysterectomy Bilateral Salpingectomy     TUBAL LIGATION      WRIST ARTHROSCOPY Right 9/27/2019    RIGHT WRIST ARTHROSCOPY, OPEN TFCC REPAIR WITH DISTAL ULNAR RADIAL JOINT, ECU TENDON STABILIZATION ([place] ANCHORS) performed by Lizzy Hercules MD at [facility] WRIST ARTHROSCOPY Left 4/16/2021    LEFT WRIST ARTHROSCOPY WITH OPEN TFCC REPAIR AND TENDON STABILIZATION performed by Lizzy Hercules MD at [facility]       Current Outpatient Medications:     acetaminophen-codeine (TYLENOL/CODEINE #3) 300-30 MG per tablet, Take 1 tablet by mouth every 4 hours as needed for Pain for up to 7 days. Intended supply: 7 days. Take lowest dose possible to manage pain, Disp: 42 tablet, Rfl: 0    ibuprofen (ADVIL;MOTRIN) 800 MG tablet, Take 1 tablet by mouth 3 times daily as needed for Pain, Disp: 90 tablet, Rfl: 0    loratadine (CLARITIN) 10 MG capsule, Take 10 mg by mouth daily, Disp: , Rfl:     cyclobenzaprine (FLEXERIL) 10 MG tablet, Take 10 mg by mouth nightly as needed for Muscle spasms, Disp: , Rfl:     ondansetron (ZOFRAN ODT) 4 MG disintegrating tablet, Take 1 tablet by mouth every 8 hours as needed for Nausea or Vomiting, Disp: 10 tablet, Rfl: 0    FLUoxetine (PROZAC) 20 MG capsule, Take 20 mg by mouth daily, Disp: , Rfl:     famotidine (PEPCID) 10 MG tablet, Take 20 mg by mouth 2 times daily Instructed to take am of procedure, Disp: , Rfl:     metFORMIN, MOD, (GLUMETZA) 500 MG extended release tablet, Take 500 mg by mouth daily (with breakfast), Disp: , Rfl:     Cholecalciferol (VITAMIN D PO), Take by mouth daily , Disp: , Rfl:     Cyanocobalamin (VITAMIN B 12 PO), Take by mouth , Disp: , Rfl:     gabapentin (NEURONTIN) 600 MG tablet, Take 800 mg by mouth 3 times daily.  ., Disp: , Rfl:   Allergies   Allergen Reactions    Tape Laurita Boston Tape] Hives     And steri strips      Social History     Socioeconomic History    Marital status: Single     Spouse name: Not on file    Number of children: (-)headaches, (-)Parkinson disease,(-) memory loss, (-) LOC. Cardiovascular: (-) Chest pain, (-) swelling in legs/feet, (-) SOB, (-) cramping in legs/feet with walking. SUBJECTIVE:      Constitutional:    The patient is alert and oriented x 3, appears to be stated age and in no distress. Left upper extremity: No scars mature. Mild swelling over the ulnar aspect of the wrist.  Mild tenderness over the TFCC and the pisiform. Radial, ulnar, median nerves are intact. She has full pronation supination. No ECU tendon instability appreciated. Full digital range of motion. She is otherwise neurovascular intact. Impression:   Encounter Diagnosis   Name Primary?  Post-op pain    Just over 3 months out left wrist arthroscopy with open TFCC DRUJ and ECU stabilization. He also had ulnar nerve decompression at elbow and carpal tunnel release. Anxiety, depression  Diabetes    Plan: Today's findings were explained to the patient. We did place her into a well molded interosseous mold short arm cast.  She will follow-up in 3 to 4 weeks for cast removal reevaluation. Possible restart therapy at that time. She requested refill on pain medicine. Informed consent was obtained for continued opioid treatment. Patient agrees to continue the current treatment and agrees the benefits of opioid treatment ( decreased pain, improved function) continue to outweigh the potential risks ( confusion, change in thinking ability, depression, dry mouth, nausea, constipation, vomiting, impaired coordination/balance, sleepiness, damage liver or kidneys, opioid-induced hyperalgesia, physical dependence, addiction, withdrawal, respiratory depression and even death).

## 2021-07-22 NOTE — PROGRESS NOTES
A fiberglass short arm cast with interosseous mold was applied to Her Left arm. Neurovascular status was checked pre and post application. Patient was neurovascularly intact after the application process. The patient denied any issues with fit or comfort of the cast/splint. advised to avoid activities that put them at risk for falling. Patient instructed to call our office if there are any issues with the cast/splint.

## 2021-08-12 ENCOUNTER — OFFICE VISIT (OUTPATIENT)
Dept: ORTHOPEDIC SURGERY | Age: 41
End: 2021-08-12
Payer: MEDICAID

## 2021-08-12 VITALS — HEIGHT: 61 IN | RESPIRATION RATE: 20 BRPM | WEIGHT: 214 LBS | BODY MASS INDEX: 40.4 KG/M2

## 2021-08-12 DIAGNOSIS — S69.81XA TFCC (TRIANGULAR FIBROCARTILAGE COMPLEX) INJURY, RIGHT, INITIAL ENCOUNTER: Primary | ICD-10-CM

## 2021-08-12 PROCEDURE — 99212 OFFICE O/P EST SF 10 MIN: CPT | Performed by: ORTHOPAEDIC SURGERY

## 2021-08-12 PROCEDURE — G8427 DOCREV CUR MEDS BY ELIG CLIN: HCPCS | Performed by: ORTHOPAEDIC SURGERY

## 2021-08-12 PROCEDURE — 4004F PT TOBACCO SCREEN RCVD TLK: CPT | Performed by: ORTHOPAEDIC SURGERY

## 2021-08-12 PROCEDURE — G8417 CALC BMI ABV UP PARAM F/U: HCPCS | Performed by: ORTHOPAEDIC SURGERY

## 2021-08-12 NOTE — PROGRESS NOTES
Chief Complaint   Patient presents with    Follow Up After Procedure     4 months out. LEFT ULNAR NERVE DECOMPRESSION AT THE ELBOW TRANSPOSITION, LEFT CARPAL TUNNEL RELEASE, LEFT WRIST ARTHROSCOPY WITH OPEN TFCC REPAIR AND TENDON STABILIZATION            Gina Stephens is a 41y.o. year old  who presents for follow up of left wrist pain. She is 4 months out from left ulnar nerve decompression at elbow, carpal tunnel release, and wrist arthropathy with open TFCC repair and DRUJ stabilization with ECU stabilization. She had an exacerbation of her left wrist symptoms and was placed into a cast at her last visit 7/22/2021. She reports that majority of her pain is subsided. She does note some stiffness after cast removal.  She describes mild pain over the dorsal lateral wrist.  And some sensitivity of the scar at her elbow.       Past Medical History:   Diagnosis Date    Anxiety     Arthritis     Carpal tunnel syndrome, left     Depression     Diabetes mellitus ([address]     Herpes genitalia     no outbreaks as of 4/13/2021    Lower back pain     ruptured discs, lumbar    Seasonal allergies      Past Surgical History:   Procedure Laterality Date    ARM SURGERY Left 4/16/2021    LEFT ULNAR NERVE DECOMPRESSION AT THE ELBOW TRANSPOSITION performed by Miguel Elizondo MD at [facility] Right 9/27/2019    RIGHT CARPAL TUNNEL RELEASE ENDOSCOPIC performed by Miguel Elizondo MD at [facility] Left 4/16/2021    LEFT CARPAL TUNNEL RELEASE performed by Miguel Elizondo MD at [place] [address]    COLONOSCOPY      CYSTOSCOPY      DILATION AND CURETTAGE OF UTERUS      ENDOMETRIAL ABLATION      HYSTERECTOMY      HYSTEROSCOPY  01/18/2017    D & C    LEEP      LITHOTRIPSY      LITHOTRIPSY Right 5/22/2020    CYSTOSCOPY RETROGRADE PYELOGRAM LASER LITHOTRIPSY, URETEROSCOPY, RIGHT STRING STENT INSERTION performed by Hector Lopez DO at [place] [address] HISTORY Bilateral 03/01/2017    Total Hysterectomy Bilateral Salpingectomy     TUBAL LIGATION      WRIST ARTHROSCOPY Right 9/27/2019    RIGHT WRIST ARTHROSCOPY, OPEN TFCC REPAIR WITH DISTAL ULNAR RADIAL JOINT, ECU TENDON STABILIZATION (Cesario Oros ANCHORS) performed by Mann Fragoso MD at [facility] WRIST ARTHROSCOPY Left 4/16/2021    LEFT WRIST ARTHROSCOPY WITH OPEN TFCC REPAIR AND TENDON STABILIZATION performed by Mann Fragoso MD at [facility] OR       Current Outpatient Medications:     ibuprofen (ADVIL;MOTRIN) 800 MG tablet, Take 1 tablet by mouth 3 times daily as needed for Pain, Disp: 90 tablet, Rfl: 0    loratadine (CLARITIN) 10 MG capsule, Take 10 mg by mouth daily, Disp: , Rfl:     cyclobenzaprine (FLEXERIL) 10 MG tablet, Take 10 mg by mouth nightly as needed for Muscle spasms, Disp: , Rfl:     ondansetron (ZOFRAN ODT) 4 MG disintegrating tablet, Take 1 tablet by mouth every 8 hours as needed for Nausea or Vomiting, Disp: 10 tablet, Rfl: 0    FLUoxetine (PROZAC) 20 MG capsule, Take 20 mg by mouth daily, Disp: , Rfl:     famotidine (PEPCID) 10 MG tablet, Take 20 mg by mouth 2 times daily Instructed to take am of procedure, Disp: , Rfl:     metFORMIN, MOD, (GLUMETZA) 500 MG extended release tablet, Take 500 mg by mouth daily (with breakfast), Disp: , Rfl:     Cholecalciferol (VITAMIN D PO), Take by mouth daily , Disp: , Rfl:     Cyanocobalamin (VITAMIN B 12 PO), Take by mouth , Disp: , Rfl:     gabapentin (NEURONTIN) 600 MG tablet, Take 800 mg by mouth 3 times daily.  ., Disp: , Rfl:   Allergies   Allergen Reactions    Tape [Adhesive Tape] Hives     And steri strips      Social History     Socioeconomic History    Marital status: Single     Spouse name: Not on file    Number of children: Not on file    Years of education: Not on file    Highest education level: Not on file   Occupational History    Not on file   Tobacco Use    Smoking status: Current Every Day Smoker     Packs/day: 0.50     Years: 25.00     Pack years: 12.50     Types: Cigarettes    Smokeless tobacco: Never Used   Vaping Use    Vaping Use: Never used   Substance and Sexual Activity    Alcohol use: No     Alcohol/week: 1.0 standard drinks     Types: 1 Shots of liquor per week     Comment: rarely    Drug use: No    Sexual activity: Never   Other Topics Concern    Not on file   Social History Narrative    Not on file     Social Determinants of Health     Financial Resource Strain:     Difficulty of Paying Living Expenses:    Food Insecurity:     Worried About Running Out of Food in the Last Year:     Ran Out of Food in the Last Year:    Transportation Needs:     Lack of Transportation (Medical):  Lack of Transportation (Non-Medical):    Physical Activity:     Days of Exercise per Week:     Minutes of Exercise per Session:    Stress:     Feeling of Stress :    Social Connections:     Frequency of Communication with Friends and Family:     Frequency of Social Gatherings with Friends and Family:     Attends Anglican Services:     Active Member of Clubs or Organizations:     Attends Club or Organization Meetings:     Marital Status:    Intimate Partner Violence:     Fear of Current or Ex-Partner:     Emotionally Abused:     Physically Abused:     Sexually Abused:      Family History   Problem Relation Age of Onset    Heart Disease Mother     Other Mother         cirrhosis liver, non alcoholic    Diabetes Father     Kidney Disease Father         dialysis    Stroke Father     High Blood Pressure Father     Heart Disease Father        Skin: (-) rash,(-) psoriasis,(-) eczema, (-)skin cancer. Musculoskeletal: Left wrist pain  Neurologic: (-) epilepsy, (-)seizures,(-) brain tumor,(-) TIA, (-)stroke, (-)headaches, (-)Parkinson disease,(-) memory loss, (-) LOC.   Cardiovascular: (-) Chest pain, (-) swelling in legs/feet, (-) SOB, (-) cramping in legs/feet with walking. SUBJECTIVE:      Constitutional:    The patient is alert and oriented x 3, appears to be stated age and in no distress. Left upper extremity: Scars well-healed. No swelling at the elbow or wrist.  No tenderness over the TFCC. Mild tenderness over the ECU tendon. There is some remaining scar sensitivity proximally at the elbow. Radial, ulnar, median nerves are intact. She has full pronation supination. No ECU tendon instability appreciated. Full digital range of motion. She is otherwise neurovascular intact. Impression:    4 months out left wrist arthroscopy with open TFCC DRUJ and ECU stabilization. She also had ulnar nerve decompression at elbow and carpal tunnel release. Anxiety, depression  Diabetes    Plan: Today's findings were explained to the patient. Recommend that she return to therapy to continue her exercises patient is agreeable to this. Recommend that she continue to wear her brace when she is out and about. She may go without her brace at home if she is not engaging in activity. Patient is agreeable to this. She will finish her course of therapy. She is instructed to call with any concerns otherwise follow-up as needed. I have seen and evaluated the patient and agree with the above assessment and plan on today's visit. I have performed the key components of the history and physical examination with significant findings of doing well 4 months postop. . I concur with the findings and plan as documented.     Veronique Hurley MD  8/12/2021

## 2021-09-01 ENCOUNTER — OFFICE VISIT (OUTPATIENT)
Dept: PAIN MANAGEMENT | Age: 41
End: 2021-09-01
Payer: MEDICAID

## 2021-09-01 ENCOUNTER — PREP FOR PROCEDURE (OUTPATIENT)
Dept: PAIN MANAGEMENT | Age: 41
End: 2021-09-01

## 2021-09-01 VITALS
HEART RATE: 84 BPM | TEMPERATURE: 97.1 F | DIASTOLIC BLOOD PRESSURE: 84 MMHG | SYSTOLIC BLOOD PRESSURE: 136 MMHG | RESPIRATION RATE: 16 BRPM | HEIGHT: 61 IN | WEIGHT: 215 LBS | BODY MASS INDEX: 40.59 KG/M2

## 2021-09-01 DIAGNOSIS — M51.34 THORACIC DEGENERATIVE DISC DISEASE: ICD-10-CM

## 2021-09-01 DIAGNOSIS — F17.200 SMOKING: ICD-10-CM

## 2021-09-01 DIAGNOSIS — M47.817 LUMBOSACRAL SPONDYLOSIS WITHOUT MYELOPATHY: Primary | ICD-10-CM

## 2021-09-01 DIAGNOSIS — M51.36 DDD (DEGENERATIVE DISC DISEASE), LUMBAR: ICD-10-CM

## 2021-09-01 DIAGNOSIS — E66.9 OBESITY, UNSPECIFIED CLASSIFICATION, UNSPECIFIED OBESITY TYPE, UNSPECIFIED WHETHER SERIOUS COMORBIDITY PRESENT: ICD-10-CM

## 2021-09-01 PROBLEM — M51.369 DDD (DEGENERATIVE DISC DISEASE), LUMBAR: Status: ACTIVE | Noted: 2021-09-01

## 2021-09-01 PROCEDURE — 4004F PT TOBACCO SCREEN RCVD TLK: CPT | Performed by: ANESTHESIOLOGY

## 2021-09-01 PROCEDURE — G8427 DOCREV CUR MEDS BY ELIG CLIN: HCPCS | Performed by: ANESTHESIOLOGY

## 2021-09-01 PROCEDURE — 99204 OFFICE O/P NEW MOD 45 MIN: CPT

## 2021-09-01 PROCEDURE — G8417 CALC BMI ABV UP PARAM F/U: HCPCS | Performed by: ANESTHESIOLOGY

## 2021-09-01 PROCEDURE — 99204 OFFICE O/P NEW MOD 45 MIN: CPT | Performed by: ANESTHESIOLOGY

## 2021-09-01 RX ORDER — ATORVASTATIN CALCIUM 10 MG/1
TABLET, FILM COATED ORAL
COMMUNITY
Start: 2021-08-23

## 2021-09-01 RX ORDER — CHOLECALCIFEROL (VITAMIN D3) 125 MCG
5 CAPSULE ORAL DAILY PRN
COMMUNITY

## 2021-09-01 RX ORDER — ACETAMINOPHEN, ASPIRIN AND CAFFEINE 250; 250; 65 MG/1; MG/1; MG/1
1 TABLET, FILM COATED ORAL EVERY 6 HOURS PRN
COMMUNITY

## 2021-09-01 NOTE — PROGRESS NOTES
[facility]        [address], [address] [facility]      [phone]                  Consult Note      Patient:  Dolores Rush,  1980    Date of Service:  21     Requesting Physician:  YESENIA Alvarez - *    Reason for Consult:      Patient presents with complaints of chronic low back pain    HISTORY OF PRESENT ILLNESS:      Ms. Dolores Rush is a 41 y.o. female [facility] of  Chronic low back pain for > 15-20 yrs. Pain predominantly axial in nature. Has been evaluated by Dr. Aisha Pemberton- recommended conservative treatment. Pain is constant and is described as aching and throbbing. Pain does not radiate. Pain causes functional limitations/ limits Adl's : Yes     Nursing notes and details of the pain history reviewed. Please see intake notes for details. Has been evaluated by Ruma Loyd- Dr. Aisha Pemberton - did not recommend surgery and recommended conservative treatment. Previous treatments:   Physical Therapy : yes, continues HEP. Medications: - NSAID's : yes             - Membrane stabilizers : yes - gabapentin            - Opioids : not on chronic opioids            - Adjuvants or Others : yes    TENS Unit: yes    Surgeries: no spine surgery    Interventional Pain procedures/ nerve blocks: no    She has been on anticoagulation medications yes,  and include ASA- Excedrin (does not take it regularly and takes only prn for headache). She has not been on herbal supplements. H/O Smoking: yes  H/O alcohol abuse : no  H/O Illicit drug use : patient denies illicit drug use. UDS on 2020: + for cocaine     Employment: unemployed    Imaging:     MRI [address]     2021:    RESULT:     Counting reference:  Lumbosacral junction.  For the purposes of this   report,  L4-5 is considered the level of the iliac crest and assume there   are 5 lumbar-type vertebrae.  Anatomic variant:  None. Localizer images: Better demonstrated is atrophy to the left kidney with   deformity and cyst formation. General:    Alignment is anatomic.  The spinal canal and neural foramen   are developmentally borderline in size and there is prominent dorsal   epidural fat accentuating compressive features. Bone marrow signal/fracture: There is a moderate in size hemangioma at   left L1 and otherwise tiny scattered intraosseous hemangiomas.  No   evidence of pathologic marrow infiltration.  No evidence of prior   fracture. Conus:  The conus is within normal limits of signal intensity and   morphology.  The conus is a normal termination at the L1 level. Paraspinal soft tissues:   Paraspinal soft tissues are within normal   limits. Lower thoracic spine/sagittal imaging:  At T11-12 and T12-L1 there is no   substantial disc herniation or spondylosis.  The spinal canal and neural   foramen are adequately maintained. L1-L2:    There is no substantial disc herniation or spondylosis.  The   spinal canal and neural foramen are adequately maintained. L2-L3:    There is minimal disc bulging and little to mild posterior   facet arthropathy.  The spinal canal and neural foramen are adequately   maintained. L3-L4:    There is minimal disc bulging and little to mild posterior   facet arthropathy.  The spinal canal and neural foramen are adequately   maintained. L4-L5:    There is mild disc bulging, prominent dorsal epidural fat and   mild posterior facet arthropathy with right-sided ligamentous thickening.    There is mild to moderate spinal canal stenosis without substantial   foraminal stenosis. L5-S1:    There is mild to moderate posterior facet arthropathy without   substantial disc herniation or disc bulging.  The spinal canal and neural   foramen are adequately maintained. Sacrum and iliac wings:   The visualized sacrum and iliac wings are   within normal limits. IMPRESSION:     Minor-mild multilevel spondylosis most significantly at L4-5. The spinal canal and neural foramen are developmentally borderline in   size and there is prominent dorsal epidural fat accentuating compressive   features. L4-5 level, multifactorial spondylosis results in mild to moderate   narrowing of the thecal sac. There is no substantial spinal canal stenosis. Abnormal appearance to the left kidney with atrophy, architectural   distortion and cyst formation.  Recommend clinical correlation and   correlation with ultrasound. Anatomic Thoracic/Lumbar Variant: None.  L4-5 is considered the level of   the iliac crest and assume there are 5 lumbar-type vertebrae. Transcribe Date/Time: Jan 7 2021  2:48P      MRI THORACIC SPINE WO IVCON    Impression    IMPRESSION:     Minor disc disease most notably mild degenerative disc disease at T3-4   with tiny disc protrusion/herniation resulting in cord contact as well as   mild to moderate left posterior facet arthropathy at T5-6 with narrowing   of the dorsal epidural space. There is no evidence for substantial cord impingement nor spinal canal   stenosis nor foraminal stenosis in the thoracic spine. Anatomic Thoracic/Lumbar Variant: None.  L4-5 is considered the level of   the iliac crest and assume there are 5 lumbar-type vertebrae.           Past Medical History:   Diagnosis Date    Anxiety     Arthritis     Carpal tunnel syndrome, left     Depression     Diabetes mellitus ([address]     Headache     Herpes genitalia     no outbreaks as of 4/13/2021    Lower back pain     ruptured discs, lumbar    Seasonal allergies        Past Surgical History:   Procedure Laterality Date    ARM SURGERY Left 4/16/2021    LEFT ULNAR NERVE DECOMPRESSION AT THE ELBOW TRANSPOSITION performed by Sahara Evangelista MD at [facility] Right 9/27/2019    RIGHT CARPAL TUNNEL RELEASE ENDOSCOPIC performed by Sahara Evangelista MD at [address] Left 4/16/2021    LEFT CARPAL TUNNEL RELEASE performed by Charli Campuzano MD at [facility]      x3    COLONOSCOPY      CYSTOSCOPY      DILATION AND CURETTAGE OF UTERUS      ENDOMETRIAL ABLATION      HYSTERECTOMY      HYSTEROSCOPY  01/18/2017    D & C    LEEP      LITHOTRIPSY      LITHOTRIPSY Right 5/22/2020    CYSTOSCOPY RETROGRADE PYELOGRAM LASER LITHOTRIPSY, URETEROSCOPY, RIGHT STRING STENT INSERTION [facility] OTHER SURGICAL HISTORY Bilateral 03/01/2017    Total Hysterectomy Bilateral Salpingectomy     TUBAL LIGATION      WRIST ARTHROSCOPY Right 9/27/2019    RIGHT WRIST ARTHROSCOPY, OPEN TFCC REPAIR WITH DISTAL ULNAR RADIAL JOINT, ECU TENDON STABILIZATION (Iraj Wang ANCHORS) [facility] WRIST ARTHROSCOPY Left 4/16/2021    LEFT WRIST ARTHROSCOPY WITH OPEN TFCC REPAIR AND TENDON STABILIZATION performed by Charli Campuzano MD at [address] [place] [address]       Prior to Admission medications    Medication Sig Start Date End Date Taking?  Authorizing Provider   atorvastatin (LIPITOR) 10 MG tablet TAKE 1 TABLET BY ORAL ROUTE EVERY DAY 8/23/21  Yes Historical Provider, MD   melatonin 5 MG TABS tablet Take 5 mg by mouth daily   Yes Historical Provider, MD   aspirin-acetaminophen-caffeine ([address] [place] [address]) [phone] MG per tablet Take 1 tablet by mouth every 6 hours as needed for Headaches   Yes Historical Provider, MD   ibuprofen (ADVIL;MOTRIN) 800 MG tablet Take 1 tablet by mouth 3 times daily as needed for Pain 7/8/21  Yes Charli Campuzano MD   loratadine (CLARITIN) 10 MG capsule Take 10 mg by mouth daily   Yes Historical Provider, MD   cyclobenzaprine (FLEXERIL) 10 MG tablet Take 10 mg by mouth nightly as needed for Muscle spasms   Yes Historical Provider, MD   famotidine (PEPCID) 10 MG tablet Take 20 mg by mouth 2 times daily Instructed to take am of procedure 1/23/20  Yes Historical Provider, MD   metFORMIN, MOD, (GLUMETZA) 500 MG extended release tablet Take 500 mg by mouth daily (with breakfast)   Yes Historical Provider, MD   Cholecalciferol (VITAMIN D PO) Take by mouth daily    Yes Historical Provider, MD   Cyanocobalamin (VITAMIN B 12 PO) Take by mouth    Yes Historical Provider, MD   gabapentin (NEURONTIN) 600 MG tablet Take 800 mg by mouth 3 times daily. .   Yes Historical Provider, MD   FLUoxetine (PROZAC) 20 MG capsule Take 20 mg by mouth daily  Patient not taking: Reported on 9/1/2021    Historical Provider, MD       Allergies   Allergen Reactions    Tape [place] Tape] Hives     And steri strips     Zyrtec [Cetirizine] Other (See Comments)     Had nightmares    Tramadol Nausea And Vomiting       Social History     Socioeconomic History    Marital status: Single     Spouse name: Not on file    Number of children: Not on file    Years of education: Not on file    Highest education level: Not on file   Occupational History    Not on file   Tobacco Use    Smoking status: Current Every Day Smoker     Packs/day: 0.50     Years: 30.00     Pack years: 15.00     Types: Cigarettes    Smokeless tobacco: Never Used   Vaping Use    Vaping Use: Never used   Substance and Sexual Activity    Alcohol use: No     Alcohol/week: 1.0 standard drinks     Types: 1 Shots of liquor per week     Comment: rarely    Drug use: No    Sexual activity: Never   Other Topics Concern    Not on file   Social History Narrative    Not on file     Social Determinants of Health     Financial Resource Strain:     Difficulty of Paying Living Expenses:    Food Insecurity:     Worried About Running Out of Food in the Last Year:     Ran Out of Food in the Last Year:    Transportation Needs:     Lack of Transportation (Medical):      Lack of Transportation (Non-Medical):    Physical Activity:     Days of Exercise per Week:     Minutes of Exercise per Session:    Stress:     Feeling of Stress :    Social Connections:     Frequency of Communication with Friends and Family:     Frequency of Social Gatherings with Friends and Family:     Attends Baptism Services:     Active Member of Clubs or Organizations:     Attends Club or Organization Meetings:     Marital Status:    Intimate Partner Violence:     Fear of Current or Ex-Partner:     Emotionally Abused:     Physically Abused:     Sexually Abused:        Family History   Problem Relation Age of Onset    Heart Disease Mother     Other Mother         cirrhosis liver, non alcoholic    Diabetes Mother     Arthritis Mother     Diabetes Father     Kidney Disease Father         dialysis    Stroke Father     High Blood Pressure Father     Heart Disease Father     Arthritis Paternal Uncle     Substance Abuse Paternal Cousin        REVIEW OF SYSTEMS:     Patient specifically denies fever/chills, chest pain, shortness of breath, new bowel or bladder complaints. All other review of systems was negative. Review of Systems - documented reviewed. PHYSICAL EXAMINATION:    Examined in the presence of Ms. Sriram Ramírez    /84   Pulse 84   Temp 97.1 °F (36.2 °C) (Infrared)   Resp 16   Ht 5' 1\" (1.549 m)   Wt 215 lb (97.5 kg)   LMP 03/27/2017   BMI 40.62 kg/m²     General:      General appearance:  Pleasant and well-hydrated, in no distress and A & O x 3  Build:Obese  Function: Rises from seated position easily and Moves about room without difficulty    HEENT:    Head:normocephalic, atraumatic  Pupils:regular, round, equal  Sclera: icterus absent    Lungs:    Breathing:normal breathing pattern     CVS:     RRR    Abdomen:    Shape:non-distended and normal  Tenderness:none  Guarding:none    Cervical spine:    Inspection:normal  Palpation:tenderness paravertebral muscles, tenderness trapezium, left, right and negative  Range of motion:Normal flexion, extension, rotation bilaterally and is not painful.   Spurling's: negative bilaterally    Thoracic spine:     Spine inspection:normal   Palpation:No tenderness over the midline and paraspinal area, bilaterally  Range of motion:normal in flexion, extension rotation bilateral and is not painful. Lumbar spine:    Spine inspection: Normal   Palpation: Tenderness paravertebral muscles Yes bilaterally  Range of motion: Decreased, flexion Decreased, Lateral bending, extension and rotation bilaterally reduced is painful. Sacroiliac joint tenderness No bilaterally  GLENYS test: negative bilaterally  Gaenslen's test:negative bilaterally   Piriformis tenderness: negative bilaterally  SLR : negative bilaterally    Musculoskeletal:    Trigger points no    Extremities:    Tremors:None bilaterally upper and lower  Edema:none x all 4 extremities    Neurological:    Sensory: Normal to light touch     Motor:                   Right Quadriceps 5/5          Left Quadriceps 5/5           Right Gastrocnemius 5/5    Left Gastrocnemius 5/5  Right Ant Tibialis 5/5  Left Ant Tibialis 5/5    Reflexes:    B/l equal diminished. Gait:normal Yes    Dermatology:    Skin:no rashes or lesions noted    Assessment/Plan:     Diagnosis Orders   1. Lumbosacral spondylosis without myelopathy     2. DDD (degenerative disc disease), lumbar     3. Smoking     4. Obesity, unspecified classification, unspecified obesity type, unspecified whether serious comorbidity present     5. Thoracic degenerative disc disease         41 y.o. female with H/o chronic low back pain - predominant axial in nature. Features of lumbar facet pain. MRI of LS spine/ thoracic spine reviewed. Has been evaluated by Dr. Gloria Ramirez from [facility]- no surgery recommended. Failed conservative treatment. Prior UDS + for cocaine- patient denies cocaine use. Plan:  Lumbar facet MBNB to address pain from L3-4, L4-5, L5-S1 FACETS under fluoroscopy. RBA discussed. If short term relief will do RFA. Moderate sedation - due to anxiety of needles.     Weight loss    Smoking cessation. Non opioid treatment options discussed. Counseling :    Patient encouraged to stay active and to watch/lose weight    Encouraged to continue Regular home exercise program as tolerated - stretching / strengthening. Smoking cessation counseling : yes     Treatment plan discussed with the patient including medication and procedure side effects.     Controlled Substances Monitoring:     OARRS reviewed    Josie Montilla MD    CC:    YESENIA Huynh - GONZALO  [address] [facility]

## 2021-09-01 NOTE — PROGRESS NOTES
Patient:  MACY Leavitt 1980  Date of Service:  21      Do you currently have any of the following:    Fever: No  Headache:  No  Cough: No  Shortness of breath: No  Exposed to anyone with these symptoms: No       Patient presents with complaints of mid and lower back pain that started 6 years ago and has been getting worse. She states the pain began following No specific cause    Pain is constant and is described as aching and sharp. She rates the pain as a 10/10 on her worst day , 4/10 on her best day, and a 8/10 on average on the VAS scale. Pain does radiate to both lower extremities. She  has numbness, tingling of the both lower extremities. Alleviating factors include: nothing. Aggravating factors include:  movement, standing. She states that the pain does keep her from sleeping at night. She took her last dose of Motrin yesterday. She is  on NSAIDS and  is  on anticoagulation medications to include ASA and is managed by herself in Excedrin. Previous treatments: Physical Therapy, steroid injections and medications. .      Personal Expectations from this treatment: increase activity and decrease pain    /84   Pulse 84   Temp 97.1 °F (36.2 °C) (Infrared)   Resp 16   Ht 5' 1\" (1.549 m)   Wt 215 lb (97.5 kg)   LMP 2017   BMI 40.62 kg/m²     Patient's last menstrual period was 2017.

## 2021-09-02 ENCOUNTER — TELEPHONE (OUTPATIENT)
Dept: PAIN MANAGEMENT | Age: 41
End: 2021-09-02

## 2021-09-14 ENCOUNTER — TELEPHONE (OUTPATIENT)
Dept: PAIN MANAGEMENT | Age: 41
End: 2021-09-14

## 2021-09-14 ENCOUNTER — ANESTHESIA EVENT (OUTPATIENT)
Dept: OPERATING ROOM | Age: 41
End: 2021-09-14
Payer: MEDICAID

## 2021-09-14 ASSESSMENT — LIFESTYLE VARIABLES: SMOKING_STATUS: 1

## 2021-09-14 NOTE — ANESTHESIA PRE PROCEDURE
Department of Anesthesiology  Preprocedure Note       Name:  [facility]   Age:  35 y.o.  :  1980                                          MRN:  [id number]         Date:  2021      Surgeon: Evelyne Brown):  Yenni Shah MD    Procedure: Procedure(s):  BILATERAL LUMBAR MEDIAL BRANCH NERVE BLOCK UNDER FLUOROSCOPIC GUIDANCE AT L2, L3, L4 AND L5 DORSAL RAMI WITH SEDATION (CPT 25443)    Medications prior to admission:   Prior to Admission medications    Medication Sig Start Date End Date Taking? Authorizing Provider   atorvastatin (LIPITOR) 10 MG tablet TAKE 1 TABLET BY ORAL ROUTE EVERY DAY 21   Historical Provider, MD   melatonin 5 MG TABS tablet Take 5 mg by mouth daily    Historical Provider, MD   aspirin-acetaminophen-caffeine (Melia Gambler) 801-893-37 MG per tablet Take 1 tablet by mouth every 6 hours as needed for Headaches    Historical Provider, MD   ibuprofen (ADVIL;MOTRIN) 800 MG tablet Take 1 tablet by mouth 3 times daily as needed for Pain 21   Misael Gonzales MD   loratadine (CLARITIN) 10 MG capsule Take 10 mg by mouth daily    Historical Provider, MD   cyclobenzaprine (FLEXERIL) 10 MG tablet Take 10 mg by mouth nightly as needed for Muscle spasms    Historical Provider, MD   FLUoxetine (PROZAC) 20 MG capsule Take 20 mg by mouth daily  Patient not taking: Reported on 2021    Historical Provider, MD   famotidine (PEPCID) 10 MG tablet Take 20 mg by mouth 2 times daily Instructed to take am of procedure 20   Historical Provider, MD   metFORMIN, MOD, (GLUMETZA) 500 MG extended release tablet Take 500 mg by mouth daily (with breakfast)    Historical Provider, MD   Cholecalciferol (VITAMIN D PO) Take by mouth daily     Historical Provider, MD   Cyanocobalamin (VITAMIN B 12 PO) Take by mouth     Historical Provider, MD   gabapentin (NEURONTIN) 600 MG tablet Take 800 mg by mouth 3 times daily. Jaydon Moreno     Historical Provider, MD       Current medications:    Current Outpatient Medications   Medication Sig Dispense Refill    atorvastatin (LIPITOR) 10 MG tablet TAKE 1 TABLET BY ORAL ROUTE EVERY DAY      melatonin 5 MG TABS tablet Take 5 mg by mouth daily      aspirin-acetaminophen-caffeine (EXCEDRIN MIGRAINE) 250-250-65 MG per tablet Take 1 tablet by mouth every 6 hours as needed for Headaches      ibuprofen (ADVIL;MOTRIN) 800 MG tablet Take 1 tablet by mouth 3 times daily as needed for Pain 90 tablet 0    loratadine (CLARITIN) 10 MG capsule Take 10 mg by mouth daily      cyclobenzaprine (FLEXERIL) 10 MG tablet Take 10 mg by mouth nightly as needed for Muscle spasms      FLUoxetine (PROZAC) 20 MG capsule Take 20 mg by mouth daily (Patient not taking: Reported on 9/1/2021)      famotidine (PEPCID) 10 MG tablet Take 20 mg by mouth 2 times daily Instructed to take am of procedure      metFORMIN, MOD, (GLUMETZA) 500 MG extended release tablet Take 500 mg by mouth daily (with breakfast)      Cholecalciferol (VITAMIN D PO) Take by mouth daily       Cyanocobalamin (VITAMIN B 12 PO) Take by mouth       gabapentin (NEURONTIN) 600 MG tablet Take 800 mg by mouth 3 times daily. .       No current facility-administered medications for this visit. Allergies:     Allergies   Allergen Reactions    Tape Dorene  Tape] Hives     And steri strips     Zyrtec [Cetirizine] Other (See Comments)     Had nightmares    Tramadol Nausea And Vomiting       Problem List:    Patient Active Problem List   Diagnosis Code    Chronic pelvic pain in female R10.2, G89.29    Post-op pain G89.18    TFCC (triangular fibrocartilage complex) injury, right, initial encounter S69.81XA    Lumbosacral spondylosis without myelopathy M47.817    DDD (degenerative disc disease), lumbar M51.36       Past Medical History:        Diagnosis Date    Anxiety     Arthritis     Carpal tunnel syndrome, left     Depression     Diabetes mellitus ([facility] [address]     Headache     Herpes genitalia     no outbreaks as of 4/13/2021  Lower back pain     ruptured discs, lumbar    Seasonal allergies        Past Surgical History:        Procedure Laterality Date    ARM SURGERY Left 4/16/2021    LEFT ULNAR NERVE DECOMPRESSION AT THE ELBOW TRANSPOSITION performed by Syed Painter MD at [place] [address] Right 9/27/2019    RIGHT CARPAL TUNNEL RELEASE ENDOSCOPIC performed by Syed Painter MD at [place] [address] Left 4/16/2021    LEFT CARPAL TUNNEL RELEASE performed by Syed Painter MD at [address]      x3    COLONOSCOPY      CYSTOSCOPY      DILATION AND CURETTAGE OF UTERUS      ENDOMETRIAL ABLATION      HYSTERECTOMY      HYSTEROSCOPY  01/18/2017    D & C    LEEP      LITHOTRIPSY      LITHOTRIPSY Right 5/22/2020    CYSTOSCOPY RETROGRADE PYELOGRAM LASER LITHOTRIPSY, URETEROSCOPY, RIGHT STRING STENT INSERTION performed by Nneka Lopez DO at [facility] OTHER SURGICAL HISTORY Bilateral 03/01/2017    Total Hysterectomy Bilateral Salpingectomy     TUBAL LIGATION      WRIST ARTHROSCOPY Right 9/27/2019    RIGHT WRIST ARTHROSCOPY, OPEN TFCC REPAIR WITH DISTAL ULNAR RADIAL JOINT, ECU TENDON STABILIZATION (Natalio Sheree ANCHORS) performed by Syed Painter MD at [facility] WRIST ARTHROSCOPY Left 4/16/2021    LEFT WRIST ARTHROSCOPY WITH OPEN TFCC REPAIR AND TENDON STABILIZATION performed by Syed Painter MD at [address] [place] [address] History:    Social History     Tobacco Use    Smoking status: Current Every Day Smoker     Packs/day: 0.50     Years: 30.00     Pack years: 15.00     Types: Cigarettes    Smokeless tobacco: Never Used   Substance Use Topics    Alcohol use: No     Alcohol/week: 1.0 standard drinks     Types: 1 Shots of liquor per week     Comment: rarely                                Ready to quit: Not Answered  Counseling given: Not Answered      Vital Signs (Current): There were no vitals filed for this visit.                                            BP Readings from Last 3 Encounters:   09/01/21 136/84   04/16/21 107/67   04/16/21 (!) 150/80       NPO Status:                                                                                 BMI:   Wt Readings from Last 3 Encounters:   09/01/21 215 lb (97.5 kg)   08/12/21 214 lb (97.1 kg)   07/22/21 214 lb (97.1 kg)     There is no height or weight on file to calculate BMI.    CBC:   Lab Results   Component Value Date    WBC 7.3 04/14/2021    RBC 4.48 04/14/2021    HGB 13.2 04/14/2021    HCT 40.3 04/14/2021    MCV 90.0 04/14/2021    RDW 13.2 04/14/2021     04/14/2021       CMP:   Lab Results   Component Value Date     04/14/2021    K 3.6 04/14/2021    K 4.1 02/20/2021     04/14/2021    CO2 29 04/14/2021    BUN 9 04/14/2021    CREATININE 1.0 04/14/2021    GFRAA >60 04/14/2021    LABGLOM >60 04/14/2021    GLUCOSE 107 04/14/2021    GLUCOSE 122 01/13/2011    PROT 7.2 02/20/2021    CALCIUM 9.7 04/14/2021    BILITOT <0.2 02/20/2021    ALKPHOS 94 02/20/2021    AST 12 02/20/2021    ALT 13 02/20/2021       POC Tests: No results for input(s): POCGLU, POCNA, POCK, POCCL, POCBUN, POCHEMO, POCHCT in the last 72 hours.     Coags: No results found for: PROTIME, INR, APTT    HCG (If Applicable):   Lab Results   Component Value Date    PREGTESTUR negative 05/03/2017        ABGs: No results found for: PHART, PO2ART, NLE5RKY, SAV9BNF, BEART, M9CRXXLJ     Type & Screen (If Applicable):  No results found for: LABABO, LABRH    Drug/Infectious Status (If Applicable):  No results found for: HIV, HEPCAB    COVID-19 Screening (If Applicable):   Lab Results   Component Value Date    COVID19 Not Detected 04/12/2021           Anesthesia Evaluation  Patient summary reviewed history of anesthetic complications:   Airway: Mallampati: III  TM distance: >3 FB   Neck ROM: full  Mouth opening: > = 3 FB Dental:    (+) upper dentures and edentulous      Pulmonary:Negative Pulmonary ROS breath sounds clear to auscultation  (+) current smoker                           Cardiovascular:Negative CV ROS          ECG reviewed  Rhythm: regular  Rate: normal                    Neuro/Psych:   (+) neuromuscular disease (Carpal tunnel syndrome):, psychiatric history:depression/anxiety             GI/Hepatic/Renal:   (+) renal disease: kidney stones,           Endo/Other:    (+) DiabetesType II DM, , .                 Abdominal:             Vascular: negative vascular ROS. Other Findings:               Anesthesia Plan      MAC     ASA 3       Induction: intravenous. Anesthetic plan and risks discussed with patient. Plan discussed with CRNA.                   Fadi Barry MD   9/14/2021

## 2021-09-22 ENCOUNTER — HOSPITAL ENCOUNTER (OUTPATIENT)
Age: 41
Discharge: HOME | End: 2021-09-24
Payer: MEDICAID

## 2021-09-22 PROCEDURE — U0005 INFEC AGEN DETEC AMPLI PROBE: HCPCS

## 2021-09-22 PROCEDURE — U0003 INFECTIOUS AGENT DETECTION BY NUCLEIC ACID (DNA OR RNA); SEVERE ACUTE RESPIRATORY SYNDROME CORONAVIRUS 2 (SARS-COV-2) (CORONAVIRUS DISEASE [COVID-19]), AMPLIFIED PROBE TECHNIQUE, MAKING USE OF HIGH THROUGHPUT TECHNOLOGIES AS DESCRIBED BY CMS-2020-01-R: HCPCS

## 2021-09-23 DIAGNOSIS — M47.817 LUMBOSACRAL SPONDYLOSIS WITHOUT MYELOPATHY: ICD-10-CM

## 2021-09-24 LAB
SARS-COV-2: NOT DETECTED
SOURCE: NORMAL

## 2021-09-28 ENCOUNTER — HOSPITAL ENCOUNTER (OUTPATIENT)
Age: 41
Setting detail: OUTPATIENT SURGERY
Discharge: HOME | End: 2021-09-28
Attending: ANESTHESIOLOGY | Admitting: ANESTHESIOLOGY
Payer: MEDICAID

## 2021-09-28 ENCOUNTER — ANESTHESIA (OUTPATIENT)
Dept: OPERATING ROOM | Age: 41
End: 2021-09-28
Payer: MEDICAID

## 2021-09-28 ENCOUNTER — HOSPITAL ENCOUNTER (OUTPATIENT)
Dept: OPERATING ROOM | Age: 41
Setting detail: OUTPATIENT SURGERY
Discharge: HOME | End: 2021-09-28
Attending: ANESTHESIOLOGY
Payer: MEDICAID

## 2021-09-28 VITALS
TEMPERATURE: 98.6 F | RESPIRATION RATE: 32 BRPM | OXYGEN SATURATION: 99 % | DIASTOLIC BLOOD PRESSURE: 111 MMHG | SYSTOLIC BLOOD PRESSURE: 187 MMHG

## 2021-09-28 VITALS
HEIGHT: 61 IN | TEMPERATURE: 97.6 F | OXYGEN SATURATION: 95 % | WEIGHT: 215 LBS | DIASTOLIC BLOOD PRESSURE: 88 MMHG | HEART RATE: 86 BPM | SYSTOLIC BLOOD PRESSURE: 145 MMHG | BODY MASS INDEX: 40.59 KG/M2 | RESPIRATION RATE: 16 BRPM

## 2021-09-28 DIAGNOSIS — M47.817 LUMBOSACRAL SPONDYLOSIS WITHOUT MYELOPATHY: Primary | ICD-10-CM

## 2021-09-28 DIAGNOSIS — M47.896 OTHER OSTEOARTHRITIS OF SPINE, LUMBAR REGION: ICD-10-CM

## 2021-09-28 LAB — METER GLUCOSE: 108 MG/DL (ref 74–99)

## 2021-09-28 PROCEDURE — 3700000000 HC ANESTHESIA ATTENDED CARE: Performed by: ANESTHESIOLOGY

## 2021-09-28 PROCEDURE — 3600000005 HC SURGERY LEVEL 5 BASE: Performed by: ANESTHESIOLOGY

## 2021-09-28 PROCEDURE — 64494 INJ PARAVERT F JNT L/S 2 LEV: CPT | Performed by: ANESTHESIOLOGY

## 2021-09-28 PROCEDURE — 82962 GLUCOSE BLOOD TEST: CPT | Performed by: ANESTHESIOLOGY

## 2021-09-28 PROCEDURE — 3209999900 FLUORO FOR SURGICAL PROCEDURES

## 2021-09-28 PROCEDURE — 64495 INJ PARAVERT F JNT L/S 3 LEV: CPT | Performed by: ANESTHESIOLOGY

## 2021-09-28 PROCEDURE — 7100000010 HC PHASE II RECOVERY - FIRST 15 MIN: Performed by: ANESTHESIOLOGY

## 2021-09-28 PROCEDURE — 2709999900 HC NON-CHARGEABLE SUPPLY: Performed by: ANESTHESIOLOGY

## 2021-09-28 PROCEDURE — 2580000003 HC RX 258: Performed by: ANESTHESIOLOGY

## 2021-09-28 PROCEDURE — 3700000001 HC ADD 15 MINUTES (ANESTHESIA): Performed by: ANESTHESIOLOGY

## 2021-09-28 PROCEDURE — 2500000003 HC RX 250 WO HCPCS: Performed by: ANESTHESIOLOGY

## 2021-09-28 PROCEDURE — 3600000015 HC SURGERY LEVEL 5 ADDTL 15MIN: Performed by: ANESTHESIOLOGY

## 2021-09-28 PROCEDURE — 6360000002 HC RX W HCPCS: Performed by: ANESTHESIOLOGY

## 2021-09-28 PROCEDURE — 6360000002 HC RX W HCPCS: Performed by: NURSE ANESTHETIST, CERTIFIED REGISTERED

## 2021-09-28 PROCEDURE — 82962 GLUCOSE BLOOD TEST: CPT

## 2021-09-28 PROCEDURE — 7100000011 HC PHASE II RECOVERY - ADDTL 15 MIN: Performed by: ANESTHESIOLOGY

## 2021-09-28 PROCEDURE — 64493 INJ PARAVERT F JNT L/S 1 LEV: CPT | Performed by: ANESTHESIOLOGY

## 2021-09-28 RX ORDER — METHYLPREDNISOLONE ACETATE 40 MG/ML
INJECTION, SUSPENSION INTRA-ARTICULAR; INTRALESIONAL; INTRAMUSCULAR; SOFT TISSUE PRN
Status: DISCONTINUED | OUTPATIENT
Start: 2021-09-28 | End: 2021-09-28 | Stop reason: ALTCHOICE

## 2021-09-28 RX ORDER — LIDOCAINE HYDROCHLORIDE 5 MG/ML
INJECTION, SOLUTION INFILTRATION; INTRAVENOUS PRN
Status: DISCONTINUED | OUTPATIENT
Start: 2021-09-28 | End: 2021-09-28 | Stop reason: ALTCHOICE

## 2021-09-28 RX ORDER — BUPIVACAINE HYDROCHLORIDE 5 MG/ML
INJECTION, SOLUTION PERINEURAL PRN
Status: DISCONTINUED | OUTPATIENT
Start: 2021-09-28 | End: 2021-09-28 | Stop reason: ALTCHOICE

## 2021-09-28 RX ORDER — FENTANYL CITRATE 50 UG/ML
INJECTION, SOLUTION INTRAMUSCULAR; INTRAVENOUS PRN
Status: DISCONTINUED | OUTPATIENT
Start: 2021-09-28 | End: 2021-09-28 | Stop reason: SDUPTHER

## 2021-09-28 RX ORDER — MIDAZOLAM HYDROCHLORIDE 1 MG/ML
INJECTION INTRAMUSCULAR; INTRAVENOUS PRN
Status: DISCONTINUED | OUTPATIENT
Start: 2021-09-28 | End: 2021-09-28 | Stop reason: SDUPTHER

## 2021-09-28 RX ORDER — SODIUM CHLORIDE, SODIUM LACTATE, POTASSIUM CHLORIDE, CALCIUM CHLORIDE 600; 310; 30; 20 MG/100ML; MG/100ML; MG/100ML; MG/100ML
INJECTION, SOLUTION INTRAVENOUS CONTINUOUS
Status: DISCONTINUED | OUTPATIENT
Start: 2021-09-28 | End: 2021-09-28 | Stop reason: HOSPADM

## 2021-09-28 RX ADMIN — FENTANYL CITRATE 50 MCG: 50 INJECTION, SOLUTION INTRAMUSCULAR; INTRAVENOUS at 09:06

## 2021-09-28 RX ADMIN — FENTANYL CITRATE 50 MCG: 50 INJECTION, SOLUTION INTRAMUSCULAR; INTRAVENOUS at 09:15

## 2021-09-28 RX ADMIN — FENTANYL CITRATE 50 MCG: 50 INJECTION, SOLUTION INTRAMUSCULAR; INTRAVENOUS at 09:07

## 2021-09-28 RX ADMIN — MIDAZOLAM 2 MG: 1 INJECTION INTRAMUSCULAR; INTRAVENOUS at 09:03

## 2021-09-28 RX ADMIN — SODIUM CHLORIDE, POTASSIUM CHLORIDE, SODIUM LACTATE AND CALCIUM CHLORIDE: 600; 310; 30; 20 INJECTION, SOLUTION INTRAVENOUS at 08:26

## 2021-09-28 RX ADMIN — FENTANYL CITRATE 50 MCG: 50 INJECTION, SOLUTION INTRAMUSCULAR; INTRAVENOUS at 09:14

## 2021-09-28 ASSESSMENT — PAIN SCALES - GENERAL: PAINLEVEL_OUTOF10: 0

## 2021-09-28 ASSESSMENT — PAIN DESCRIPTION - DESCRIPTORS: DESCRIPTORS: DISCOMFORT

## 2021-09-28 ASSESSMENT — PAIN - FUNCTIONAL ASSESSMENT: PAIN_FUNCTIONAL_ASSESSMENT: 0-10

## 2021-09-28 NOTE — ANESTHESIA POSTPROCEDURE EVALUATION
Department of Anesthesiology  Postprocedure Note    Patient: [facility]  MRN: [id number]  YOB: 1980  Date of evaluation: 9/28/2021  Time:  9:53 AM     Procedure Summary     Date: 09/28/21 Room / Location: [address] [place] [address] 04 / [address]    Anesthesia Start: 0901 Anesthesia Stop: 9223    Procedure: BILATERAL LUMBAR MEDIAL BRANCH NERVE BLOCK UNDER FLUOROSCOPIC GUIDANCE AT L2, L3, L4 AND L5 DORSAL RAMI WITH SEDATION (CPT 28032) (Bilateral ) Diagnosis: (LUMBOSACRAL SPONDYLOSIS)    Surgeons: Yenni Shah MD Responsible Provider: Abe Vidal MD    Anesthesia Type: MAC ASA Status: 3          Anesthesia Type: MAC    Shashank Phase I: Shashank Score: 10    Shashank Phase II: Shashank Score: 10    Last vitals: Reviewed and per EMR flowsheets.        Anesthesia Post Evaluation    Patient location during evaluation: PACU  Patient participation: complete - patient participated  Level of consciousness: awake and alert  Airway patency: patent  Nausea & Vomiting: no nausea and no vomiting  Complications: no  Cardiovascular status: hemodynamically stable  Respiratory status: room air and spontaneous ventilation  Hydration status: stable

## 2021-09-28 NOTE — H&P
[facility]  [address] [facility], [address] [place] [address] John  [phone]    Procedure History & Physical      Cl Ley     HPI:    Patient  is here for Lumbar facet MBNB  for low  Back pain.   Labs/imaging studies reviewed   All question and concerns addressed including R/B/A associated with the procedure    Past Medical History:   Diagnosis Date    Anxiety     Arthritis     Asthma     Carpal tunnel syndrome, left     Depression     Diabetes mellitus ([address]     PO    GERD (gastroesophageal reflux disease)     Headache     Herpes genitalia     no outbreaks as of 4/13/2021    Hyperlipidemia     Lower back pain     ruptured discs, lumbar    Seasonal allergies        Past Surgical History:   Procedure Laterality Date    ARM SURGERY Left 4/16/2021    LEFT ULNAR NERVE DECOMPRESSION AT THE ELBOW TRANSPOSITION performed by Sahara Evangelista MD at [facility] Right 9/27/2019    RIGHT CARPAL TUNNEL RELEASE ENDOSCOPIC performed by Sahara Evangelista MD at [facility] Left 4/16/2021    LEFT CARPAL TUNNEL RELEASE performed by Sahara Evangelista MD at [place] [address]    COLONOSCOPY      CYSTOSCOPY      DILATION AND CURETTAGE OF UTERUS      ENDOMETRIAL ABLATION      HYSTERECTOMY      HYSTEROSCOPY  01/18/2017    D & C    LEEP      LITHOTRIPSY      LITHOTRIPSY Right 5/22/2020    CYSTOSCOPY RETROGRADE PYELOGRAM LASER LITHOTRIPSY, URETEROSCOPY, RIGHT STRING STENT INSERTION performed by Filomena Lopez DO at [facility] OTHER SURGICAL HISTORY Bilateral 03/01/2017    Total Hysterectomy Bilateral Salpingectomy     TUBAL LIGATION      WRIST ARTHROSCOPY Right 9/27/2019    RIGHT WRIST ARTHROSCOPY, OPEN TFCC REPAIR WITH DISTAL ULNAR RADIAL JOINT, ECU TENDON STABILIZATION (Jewel Ee ANCHORS) performed by Sahara Evangelista MD at [facility] WRIST ARTHROSCOPY Left 4/16/2021    LEFT WRIST ARTHROSCOPY WITH OPEN TFCC REPAIR AND TENDON STABILIZATION performed by Mahad Hastings MD at [address]       Prior to Admission medications    Medication Sig Start Date End Date Taking? Authorizing Provider   atorvastatin (LIPITOR) 10 MG tablet TAKE 1 TABLET BY ORAL ROUTE EVERY DAY 8/23/21  Yes Historical Provider, MD   melatonin 5 MG TABS tablet Take 5 mg by mouth daily as needed    Yes Historical Provider, MD   aspirin-acetaminophen-caffeine (Blakely Keep) 282-194-87 MG per tablet Take 1 tablet by mouth every 6 hours as needed for Headaches Hold pre-op   Yes Historical Provider, MD   ibuprofen (ADVIL;MOTRIN) 800 MG tablet Take 1 tablet by mouth 3 times daily as needed for Pain  Patient taking differently: Take 800 mg by mouth 3 times daily as needed for Pain Hold pre-op 7/8/21  Yes Mahad Hastings MD   loratadine (CLARITIN) 10 MG capsule Take 10 mg by mouth daily   Yes Historical Provider, MD   cyclobenzaprine (FLEXERIL) 10 MG tablet Take 10 mg by mouth nightly as needed for Muscle spasms   Yes Historical Provider, MD   FLUoxetine (PROZAC) 20 MG capsule Take 20 mg by mouth daily    Yes Historical Provider, MD   famotidine (PEPCID) 10 MG tablet Take 20 mg by mouth 2 times daily as needed Instructed to take am of procedure 1/23/20  Yes Historical Provider, MD   metFORMIN, MOD, (GLUMETZA) 500 MG extended release tablet Take 500 mg by mouth daily (with breakfast)   Yes Historical Provider, MD   Cholecalciferol (VITAMIN D PO) Take by mouth daily 1000IU   Yes Historical Provider, MD   Cyanocobalamin (VITAMIN B 12 PO) Take by mouth    Yes Historical Provider, MD   gabapentin (NEURONTIN) 600 MG tablet Take 800 mg by mouth 3 times daily.  Gini Lucas Historical Provider, MD       Allergies   Allergen Reactions    Tape Su Schilder Tape] Hives     And steri strips     Zyrtec [Cetirizine] Other (See Comments)     Had nightmares    Tramadol Nausea And Vomiting       Social History     Socioeconomic History    Marital status: Single     Spouse name: Not on file    Number of children: Not on file    Years of education: Not on file    Highest education level: Not on file   Occupational History    Not on file   Tobacco Use    Smoking status: Current Every Day Smoker     Packs/day: 0.50     Years: 30.00     Pack years: 15.00     Types: Cigarettes    Smokeless tobacco: Never Used   Vaping Use    Vaping Use: Never used   Substance and Sexual Activity    Alcohol use: No     Alcohol/week: 1.0 standard drinks     Types: 1 Shots of liquor per week     Comment: rarely    Drug use: No    Sexual activity: Never   Other Topics Concern    Not on file   Social History Narrative    Not on file     Social Determinants of Health     Financial Resource Strain:     Difficulty of Paying Living Expenses:    Food Insecurity:     Worried About Running Out of Food in the Last Year:     Ran Out of Food in the Last Year:    Transportation Needs:     Lack of Transportation (Medical):      Lack of Transportation (Non-Medical):    Physical Activity:     Days of Exercise per Week:     Minutes of Exercise per Session:    Stress:     Feeling of Stress :    Social Connections:     Frequency of Communication with Friends and Family:     Frequency of Social Gatherings with Friends and Family:     Attends Faith Services:     Active Member of Clubs or Organizations:     Attends Club or Organization Meetings:     Marital Status:    Intimate Partner Violence:     Fear of Current or Ex-Partner:     Emotionally Abused:     Physically Abused:     Sexually Abused:        Family History   Problem Relation Age of Onset    Heart Disease Mother     Other Mother         cirrhosis liver, non alcoholic    Diabetes Mother     Arthritis Mother     Diabetes Father     Kidney Disease Father         dialysis    Stroke Father     High Blood Pressure Father     Heart Disease Father     Arthritis Paternal Uncle     Substance Abuse Paternal Cousin          REVIEW OF SYSTEMS: CONSTITUTIONAL:  negative for  fevers, chills, sweats and fatigue    RESPIRATORY:  negative for  dry cough, cough with sputum, dyspnea, wheezing and chest pain    CARDIOVASCULAR:  negative for chest pain, dyspnea, palpitations, syncope    GASTROINTESTINAL:  negative for nausea, vomiting, change in bowel habits, diarrhea, constipation and abdominal pain    MUSCULOSKELETAL: negative for muscle weakness    SKIN: negative for itching or rashes. BEHAVIOR/PSYCH:  negative for poor appetite, increased appetite, decreased sleep and poor concentration    All other systems negative      PHYSICAL EXAM:    VITALS:  BP (!) 128/92   Pulse 85   Temp 97.6 °F (36.4 °C) (Skin)   Resp 16   Ht 5' 1\" (1.549 m)   Wt 215 lb (97.5 kg)   LMP 03/27/2017   SpO2 96%   BMI 40.62 kg/m²     CONSTITUTIONAL:  awake, alert, cooperative, no apparent distress, and appears stated age    EYES: PERRLA, EOMI    LUNGS:  No increased work of breathing, no audible wheezing    CARDIOVASCULAR:  regular rate and rhythm    ABDOMEN:  Soft non tender non distended     EXTREMITIES: no signs of clubbing or cyanosis. MUSCULOSKELETAL: negative for flaccid muscle tone or spastic movements. SKIN: gross examination reveals no signs of rashes, or diaphoresis. NEURO: Cranial nerves II-XII grossly intact. No signs of agitated mood. Assessment/Plan:    Patient  is here for Lumbar facet MBNB  for low  Back pain. The patient was counseled at length about the risks of buffy Covid-19 during their perioperative period and any recovery window from their procedure. The patient was made aware that buffy Covid-19  may worsen their prognosis for recovering from their procedure  and lend to a higher morbidity and/or mortality risk. All material risks, benefits, and reasonable alternatives including postponing the procedure were discussed. The patient does wish to proceed with the procedure at this time.       Donavan Regan MD

## 2021-09-28 NOTE — OP NOTE
Operative Note      Patient: Ismael Leiva  YOB: 1980  MRN: [id number]    Date of Procedure: 2021    Pre-Op Diagnosis: LUMBOSACRAL SPONDYLOSIS    Post-Op Diagnosis: Same       Procedure(s):  BILATERAL LUMBAR MEDIAL BRANCH NERVE BLOCK UNDER FLUOROSCOPIC GUIDANCE AT L2, L3, L4 AND L5 DORSAL RAMI WITH SEDATION     Surgeon(s):  Colton Chavis MD    Assistant:   * No surgical staff found *    Anesthesia: Monitor Anesthesia Care    Estimated Blood Loss (mL): Minimal    Complications: None    Specimens:   * No specimens in log *    Implants:  * No implants in log *      Drains: * No LDAs found *    Findings: good needle placement    Detailed Description of Procedure:   2021    Patient: Ismael Leiva  :  1980  Age:  35 y.o. Sex:  female     PRE-OPERATIVE DIAGNOSIS: Bilateral Lumbar spondylosis, lumbar facet syndrome. POST-OPERATIVE DIAGNOSIS: Same. PROCEDURE:  # 1 Fluoroscopic guided lumbar medial branch blocks Bilateral at Levels: L3-4, L4-5, L5-S1 ( B/L L2,L3, L4MB & L5DR)    SURGEON: Colton Chavis MD    ANESTHESIA: MAC    ESTIMATED BLOOD LOSS: None.  ______________________________________________________________________  BRIEF HISTORY:  Ismael Leiva comes in today for 1 fluoroscopic guided lumbar medial branch blocks  Bilateral  at Levels: L3-4, L4-5, L5-S1. The potential complications of this procedure were discussed with her again today. She has elected to undergo the aforementioned procedure. Radha complete History & Physical examination were reviewed in depth, a copy of which is in the chart. DESCRIPTION OF PROCEDURE:   After confirming written and informed consent, a time-out was performed and Radha name and date of birth, the procedure to be performed as well as the plan for the location of the needle insertion were confirmed. The patient was brought into the procedure room and placed in the prone position on the fluoroscopy table. Standard monitors were placed and vital signs were observed throughout the procedure. The area of the lumbar spine was prepped with chloraprep and draped in a sterile manner. AP fluoroscopy was used to identify and mac bartons point at the targeted levels. The skin and subcutaneous tissues in these identified areas were anesthetized with 0.5%Lidocaine. A 22 # gauge 5 inch spinal needle was advanced toward the junction of the superior articular process and the transverse process, along the course of the medial branch. Satisfactory needle placement was confirmed by AP and oblique projections. At the sacral alar level, the sacral alar region was visualized and the needle tip was positioned on the sacral alar at the base of the superior articulating process where the medial branch traverses under fluoroscopic guidance. Once bone was contacted and negative aspiration was confirmed. A solution of 0.5% marcaine with 5 mg DepoMedrol 1 cc was then injected at each level. Following the procedure Gwendolynjm Hardy noted improvement of previous pain symptoms. Disposition the patient tolerated the procedure well and there were no complications . Vital signs remained stable throughout the procedure. The patient was escorted to the recovery area where they remained until discharge and written discharge instructions for the procedure were given. Plan: Meet Dash will return to our pain management center as scheduled.      Pre-procedure pain: 7-8/10    Post-procedure pain: 0-1/10      Beba Martinez MD

## 2022-01-24 ENCOUNTER — HOSPITAL ENCOUNTER (OUTPATIENT)
Age: 42
Discharge: HOME | End: 2022-01-26
Payer: MEDICAID

## 2022-01-24 ENCOUNTER — HOSPITAL ENCOUNTER (OUTPATIENT)
Dept: GENERAL RADIOLOGY | Age: 42
Discharge: HOME | End: 2022-01-26
Payer: MEDICAID

## 2022-01-24 DIAGNOSIS — M79.644 FINGER PAIN, RIGHT: ICD-10-CM

## 2022-01-24 PROCEDURE — 73130 X-RAY EXAM OF HAND: CPT

## 2023-08-24 ENCOUNTER — HOSPITAL ENCOUNTER (EMERGENCY)
Age: 43
Discharge: HOME | End: 2023-08-24
Payer: MEDICAID

## 2023-08-24 VITALS
RESPIRATION RATE: 18 BRPM | OXYGEN SATURATION: 98 % | HEART RATE: 84 BPM | SYSTOLIC BLOOD PRESSURE: 120 MMHG | DIASTOLIC BLOOD PRESSURE: 74 MMHG | TEMPERATURE: 98 F

## 2023-08-24 DIAGNOSIS — N76.4 ABSCESS OF RIGHT GENITAL LABIA: Primary | ICD-10-CM

## 2023-08-24 PROCEDURE — 6370000000 HC RX 637 (ALT 250 FOR IP): Performed by: PHYSICIAN ASSISTANT

## 2023-08-24 PROCEDURE — 99283 EMERGENCY DEPT VISIT LOW MDM: CPT

## 2023-08-24 PROCEDURE — 56405 I&D VULVA/PERINEAL ABSCESS: CPT

## 2023-08-24 PROCEDURE — 2500000003 HC RX 250 WO HCPCS: Performed by: PHYSICIAN ASSISTANT

## 2023-08-24 RX ORDER — SULFAMETHOXAZOLE AND TRIMETHOPRIM 800; 160 MG/1; MG/1
2 TABLET ORAL ONCE
Status: COMPLETED | OUTPATIENT
Start: 2023-08-24 | End: 2023-08-24

## 2023-08-24 RX ORDER — NAPROXEN 500 MG/1
500 TABLET ORAL 2 TIMES DAILY WITH MEALS
Qty: 10 TABLET | Refills: 0 | Status: SHIPPED | OUTPATIENT
Start: 2023-08-24 | End: 2023-08-29

## 2023-08-24 RX ORDER — LIDOCAINE HYDROCHLORIDE 10 MG/ML
5 INJECTION, SOLUTION INFILTRATION; PERINEURAL ONCE
Status: COMPLETED | OUTPATIENT
Start: 2023-08-24 | End: 2023-08-24

## 2023-08-24 RX ORDER — SULFAMETHOXAZOLE AND TRIMETHOPRIM 800; 160 MG/1; MG/1
2 TABLET ORAL 2 TIMES DAILY
Qty: 28 TABLET | Refills: 0 | Status: SHIPPED | OUTPATIENT
Start: 2023-08-24 | End: 2023-08-31

## 2023-08-24 RX ADMIN — SULFAMETHOXAZOLE AND TRIMETHOPRIM 2 TABLET: 800; 160 TABLET ORAL at 16:18

## 2023-08-24 RX ADMIN — LIDOCAINE HYDROCHLORIDE 5 ML: 10 INJECTION, SOLUTION INFILTRATION; PERINEURAL at 16:18

## 2023-08-24 NOTE — ED NOTES
Patient discharged, all questions answered, all medications and pain management discussed. Patient will return if any problems arise.         Cristobal Cerrato RN  08/24/23 [id number]

## 2023-08-24 NOTE — ED PROVIDER NOTES
of follow-up with the physician I referred them to in the timeframe recommended. I discussed with the patient emergent symptoms and the need to immediately return to the ER. Written information was included in their discharge instructions. Additional verbal discharge instructions were also given and discussed with the patient to supplement those generated by the EMR. We also discussed medications that were prescribed  (if any) including common side effects and interactions. The patient was advised to abstain from driving, operating heavy machinery or making significant decisions while taking medications such as opiates and muscle relaxers that may impair this. All questions were addressed. They understand return precautions and discharge instructions. The patient  expressed understanding. Vitals were stable and they were in no distress at discharge. Assessment      1. Abscess of right genital labia      Plan   Discharged home  Patient condition is good    New Medications     Discharge Medication List as of 8/24/2023  5:15 PM        START taking these medications    Details   sulfamethoxazole-trimethoprim (BACTRIM DS) 800-160 MG per tablet Take 2 tablets by mouth 2 times daily for 7 days, Disp-28 tablet, R-0Print      naproxen (NAPROSYN) 500 MG tablet Take 1 tablet by mouth 2 times daily (with meals) for 5 days, Disp-10 tablet, R-0Print           Electronically signed by HEATHER Etienne   DD: 8/24/23  **This report was transcribed using voice recognition software. Every effort was made to ensure accuracy; however, inadvertent computerized transcription errors may be present.   END OF ED PROVIDER NOTE      Jaimee Etienne  08/24/23 1952

## 2023-09-14 ENCOUNTER — HOSPITAL ENCOUNTER (EMERGENCY)
Age: 43
Discharge: HOME | End: 2023-09-14
Attending: EMERGENCY MEDICINE
Payer: MEDICAID

## 2023-09-14 ENCOUNTER — APPOINTMENT (OUTPATIENT)
Dept: CT IMAGING | Age: 43
End: 2023-09-14
Payer: MEDICAID

## 2023-09-14 VITALS
RESPIRATION RATE: 16 BRPM | DIASTOLIC BLOOD PRESSURE: 80 MMHG | SYSTOLIC BLOOD PRESSURE: 128 MMHG | HEART RATE: 68 BPM | OXYGEN SATURATION: 95 % | BODY MASS INDEX: 40.62 KG/M2 | TEMPERATURE: 98.2 F | WEIGHT: 215 LBS

## 2023-09-14 DIAGNOSIS — N30.01 ACUTE CYSTITIS WITH HEMATURIA: Primary | ICD-10-CM

## 2023-09-14 DIAGNOSIS — R10.9 ABDOMINAL PAIN, UNSPECIFIED ABDOMINAL LOCATION: ICD-10-CM

## 2023-09-14 DIAGNOSIS — K59.00 CONSTIPATION, UNSPECIFIED CONSTIPATION TYPE: ICD-10-CM

## 2023-09-14 LAB
ALBUMIN SERPL-MCNC: 3.8 G/DL (ref 3.5–5.2)
ALP SERPL-CCNC: 87 U/L (ref 35–104)
ALT SERPL-CCNC: 31 U/L (ref 0–32)
ANION GAP SERPL CALCULATED.3IONS-SCNC: 10 MMOL/L (ref 7–16)
AST SERPL-CCNC: 18 U/L (ref 0–31)
BACTERIA URNS QL MICRO: ABNORMAL
BASOPHILS # BLD: 0.05 K/UL (ref 0–0.2)
BASOPHILS NFR BLD: 1 % (ref 0–2)
BILIRUB SERPL-MCNC: <0.2 MG/DL (ref 0–1.2)
BILIRUB UR QL STRIP: NEGATIVE
BUN SERPL-MCNC: 25 MG/DL (ref 6–20)
CALCIUM SERPL-MCNC: 9.3 MG/DL (ref 8.6–10.2)
CHLORIDE SERPL-SCNC: 104 MMOL/L (ref 98–107)
CLARITY UR: CLEAR
CO2 SERPL-SCNC: 25 MMOL/L (ref 22–29)
COLOR UR: YELLOW
CREAT SERPL-MCNC: 1.1 MG/DL (ref 0.5–1)
EOSINOPHIL # BLD: 0.11 K/UL (ref 0.05–0.5)
EOSINOPHILS RELATIVE PERCENT: 2 % (ref 0–6)
EPI CELLS #/AREA URNS HPF: ABNORMAL /HPF
ERYTHROCYTE [DISTWIDTH] IN BLOOD BY AUTOMATED COUNT: 13.2 % (ref 11.5–15)
GFR SERPL CREATININE-BSD FRML MDRD: >60 ML/MIN/1.73M2
GLUCOSE SERPL-MCNC: 118 MG/DL (ref 74–99)
GLUCOSE UR STRIP-MCNC: 100 MG/DL
HCT VFR BLD AUTO: 34.2 % (ref 34–48)
HGB BLD-MCNC: 11.1 G/DL (ref 11.5–15.5)
HGB UR QL STRIP.AUTO: ABNORMAL
IMM GRANULOCYTES # BLD AUTO: 0.03 K/UL (ref 0–0.58)
IMM GRANULOCYTES NFR BLD: 1 % (ref 0–5)
KETONES UR STRIP-MCNC: NEGATIVE MG/DL
LACTATE BLDV-SCNC: 1.4 MMOL/L (ref 0.5–2.2)
LEUKOCYTE ESTERASE UR QL STRIP: ABNORMAL
LIPASE SERPL-CCNC: 46 U/L (ref 13–60)
LYMPHOCYTES NFR BLD: 2.24 K/UL (ref 1.5–4)
LYMPHOCYTES RELATIVE PERCENT: 39 % (ref 20–42)
MCH RBC QN AUTO: 29.8 PG (ref 26–35)
MCHC RBC AUTO-ENTMCNC: 32.5 G/DL (ref 32–34.5)
MCV RBC AUTO: 91.9 FL (ref 80–99.9)
MONOCYTES NFR BLD: 0.43 K/UL (ref 0.1–0.95)
MONOCYTES NFR BLD: 7 % (ref 2–12)
NEUTROPHILS NFR BLD: 51 % (ref 43–80)
NEUTS SEG NFR BLD: 2.94 K/UL (ref 1.8–7.3)
NITRITE UR QL STRIP: POSITIVE
PH UR STRIP: 6 [PH] (ref 5–9)
PLATELET # BLD AUTO: 307 K/UL (ref 130–450)
PMV BLD AUTO: 9.7 FL (ref 7–12)
POTASSIUM SERPL-SCNC: 4.4 MMOL/L (ref 3.5–5)
PROT SERPL-MCNC: 6.4 G/DL (ref 6.4–8.3)
PROT UR STRIP-MCNC: ABNORMAL MG/DL
RBC # BLD AUTO: 3.72 M/UL (ref 3.5–5.5)
RBC #/AREA URNS HPF: ABNORMAL /HPF
SODIUM SERPL-SCNC: 139 MMOL/L (ref 132–146)
SP GR UR STRIP: 1.01 (ref 1–1.03)
UROBILINOGEN UR STRIP-ACNC: 1 EU/DL (ref 0–1)
WBC #/AREA URNS HPF: ABNORMAL /HPF
WBC OTHER # BLD: 5.8 K/UL (ref 4.5–11.5)

## 2023-09-14 PROCEDURE — 2580000003 HC RX 258: Performed by: EMERGENCY MEDICINE

## 2023-09-14 PROCEDURE — 80053 COMPREHEN METABOLIC PANEL: CPT

## 2023-09-14 PROCEDURE — 85025 COMPLETE CBC W/AUTO DIFF WBC: CPT

## 2023-09-14 PROCEDURE — 83690 ASSAY OF LIPASE: CPT

## 2023-09-14 PROCEDURE — 96374 THER/PROPH/DIAG INJ IV PUSH: CPT

## 2023-09-14 PROCEDURE — 74177 CT ABD & PELVIS W/CONTRAST: CPT

## 2023-09-14 PROCEDURE — 6360000002 HC RX W HCPCS: Performed by: EMERGENCY MEDICINE

## 2023-09-14 PROCEDURE — 87086 URINE CULTURE/COLONY COUNT: CPT

## 2023-09-14 PROCEDURE — 99285 EMERGENCY DEPT VISIT HI MDM: CPT

## 2023-09-14 PROCEDURE — 6370000000 HC RX 637 (ALT 250 FOR IP): Performed by: EMERGENCY MEDICINE

## 2023-09-14 PROCEDURE — 81001 URINALYSIS AUTO W/SCOPE: CPT

## 2023-09-14 PROCEDURE — 83605 ASSAY OF LACTIC ACID: CPT

## 2023-09-14 PROCEDURE — 96375 TX/PRO/DX INJ NEW DRUG ADDON: CPT

## 2023-09-14 PROCEDURE — 6360000004 HC RX CONTRAST MEDICATION: Performed by: RADIOLOGY

## 2023-09-14 RX ORDER — FENTANYL CITRATE 0.05 MG/ML
50 INJECTION, SOLUTION INTRAMUSCULAR; INTRAVENOUS ONCE
Status: COMPLETED | OUTPATIENT
Start: 2023-09-14 | End: 2023-09-14

## 2023-09-14 RX ORDER — POLYETHYLENE GLYCOL 3350 17 G/17G
17 POWDER, FOR SOLUTION ORAL DAILY
Qty: 510 G | Refills: 0 | Status: SHIPPED | OUTPATIENT
Start: 2023-09-14 | End: 2023-10-14

## 2023-09-14 RX ORDER — 0.9 % SODIUM CHLORIDE 0.9 %
1000 INTRAVENOUS SOLUTION INTRAVENOUS ONCE
Status: COMPLETED | OUTPATIENT
Start: 2023-09-14 | End: 2023-09-14

## 2023-09-14 RX ORDER — KETOROLAC TROMETHAMINE 10 MG/1
10 TABLET, FILM COATED ORAL EVERY 6 HOURS PRN
Qty: 20 TABLET | Refills: 0 | Status: SHIPPED | OUTPATIENT
Start: 2023-09-14

## 2023-09-14 RX ORDER — ONDANSETRON 2 MG/ML
4 INJECTION INTRAMUSCULAR; INTRAVENOUS ONCE
Status: COMPLETED | OUTPATIENT
Start: 2023-09-14 | End: 2023-09-14

## 2023-09-14 RX ORDER — OXYCODONE HYDROCHLORIDE AND ACETAMINOPHEN 5; 325 MG/1; MG/1
1 TABLET ORAL ONCE
Status: COMPLETED | OUTPATIENT
Start: 2023-09-14 | End: 2023-09-14

## 2023-09-14 RX ORDER — CEFDINIR 300 MG/1
300 CAPSULE ORAL 2 TIMES DAILY
Qty: 14 CAPSULE | Refills: 0 | Status: SHIPPED | OUTPATIENT
Start: 2023-09-14 | End: 2023-09-21

## 2023-09-14 RX ADMIN — ONDANSETRON 4 MG: 2 INJECTION INTRAMUSCULAR; INTRAVENOUS at 15:34

## 2023-09-14 RX ADMIN — SODIUM CHLORIDE 1000 ML: 9 INJECTION, SOLUTION INTRAVENOUS at 15:35

## 2023-09-14 RX ADMIN — FENTANYL CITRATE 50 MCG: 0.05 INJECTION, SOLUTION INTRAMUSCULAR; INTRAVENOUS at 15:35

## 2023-09-14 RX ADMIN — WATER 2000 MG: 1 INJECTION INTRAMUSCULAR; INTRAVENOUS; SUBCUTANEOUS at 17:40

## 2023-09-14 RX ADMIN — IOPAMIDOL 75 ML: 755 INJECTION, SOLUTION INTRAVENOUS at 18:03

## 2023-09-14 RX ADMIN — OXYCODONE AND ACETAMINOPHEN 1 TABLET: 325; 5 TABLET ORAL at 20:09

## 2023-09-14 ASSESSMENT — PAIN SCALES - GENERAL
PAINLEVEL_OUTOF10: 7
PAINLEVEL_OUTOF10: 8
PAINLEVEL_OUTOF10: 8
PAINLEVEL_OUTOF10: 4

## 2023-09-14 ASSESSMENT — PAIN DESCRIPTION - ORIENTATION
ORIENTATION: RIGHT;LEFT
ORIENTATION: LEFT;RIGHT;LOWER
ORIENTATION: RIGHT;LEFT
ORIENTATION: LEFT

## 2023-09-14 ASSESSMENT — PAIN DESCRIPTION - LOCATION
LOCATION: FLANK

## 2023-09-14 ASSESSMENT — PAIN DESCRIPTION - DESCRIPTORS
DESCRIPTORS: NAGGING
DESCRIPTORS: NAGGING
DESCRIPTORS: NAGGING;ACHING

## 2023-09-14 NOTE — ED PROVIDER NOTES
COLONOSCOPY      CYSTOSCOPY      DILATION AND CURETTAGE OF UTERUS      ENDOMETRIAL ABLATION      HYSTERECTOMY (CERVIX STATUS UNKNOWN)      HYSTEROSCOPY  01/18/2017    D & C    LEEP      LITHOTRIPSY      LITHOTRIPSY Right 5/22/2020    CYSTOSCOPY RETROGRADE PYELOGRAM LASER LITHOTRIPSY, URETEROSCOPY, RIGHT STRING STENT INSERTION performed by Lebron Lopez DO at [address] Bilateral 9/28/2021    BILATERAL LUMBAR MEDIAL BRANCH NERVE BLOCK UNDER FLUOROSCOPIC GUIDANCE AT L2, L3, L4 AND L5 DORSAL RAMI WITH SEDATION (CPT 82275) performed by Louis Gonzalez MD at [address] [place] Bilateral 03/01/2017    Total Hysterectomy Bilateral Salpingectomy     TUBAL LIGATION      WRIST ARTHROSCOPY Right 9/27/2019    RIGHT WRIST ARTHROSCOPY, OPEN TFCC REPAIR WITH DISTAL ULNAR RADIAL JOINT, ECU TENDON STABILIZATION ([place] Levans ANCHORS) performed by Bernarda Roper MD at [address] [place] [address] ARTHROSCOPY Left 4/16/2021    LEFT WRIST ARTHROSCOPY WITH OPEN TFCC REPAIR AND TENDON STABILIZATION performed by Bernarda Roper MD at [facility]       Discharge Medication List as of 9/14/2023  8:36 PM        CONTINUE these medications which have NOT CHANGED    Details   naproxen (NAPROSYN) 500 MG tablet Take 1 tablet by mouth 2 times daily (with meals) for 5 days, Disp-10 tablet, R-0Print      atorvastatin (LIPITOR) 10 MG tablet TAKE 1 TABLET BY ORAL ROUTE EVERY DAYHistorical Med      melatonin 5 MG TABS tablet Take 5 mg by mouth daily as needed Historical Med      aspirin-acetaminophen-caffeine (EXCEDRIN MIGRAINE) 250-250-65 MG per tablet Take 1 tablet by mouth every 6 hours as needed for Headaches Hold pre-opHistorical Med      loratadine (CLARITIN) 10 MG capsule Take 10 mg by mouth dailyHistorical Med      cyclobenzaprine (FLEXERIL) 10 MG tablet Take 10 mg by mouth nightly as needed for Muscle spasmsHistorical Med      FLUoxetine (PROZAC) 20 MG capsule Take 20 mg by mouth daily no outbreaks as of 4/13/2021    Hyperlipidemia     Lower back pain     ruptured discs, lumbar    Seasonal allergies        CONSULTS: (Who and What was discussed)  None    Discussion with Other Profesionals : None    Social Determinants : Patient is Homeless    Records Reviewed : Source Dr. Donny Cuevas note from 8/9    CC/HPI Summary, DDx, ED Course, and Reassessment: CBC is ordered to evaluate for any signs of infection or inflammation by obtaining a WBC count, or any signs of acute anemia by interpreting hemoglobin. CMP was ordered to evaluate for any electrolyte imbalances, kidney function, hepatic injury or any elevations in anion gap. Urinalysis ordered to evaluate for a UTI and/or hematuria. Blood cultures are ordered to evaluate, rule out and, if present, treat bacteremia with antibiotics narrowed down to the found organism. A CT abdomen with IV contrast was ordered to evaluate for, but without limitation to, ureterolithiasis, nephrolithiasis, constipation, hollow organ perforation, small bowel obstruction, bowel ischemia, pneumoperitoneum, diverticulitis, cholecystitis, appendicitis, perforation. Disposition Considerations (Tests not ordered but considered, Shared Decision Making, Pt Expectation of Test or Tx.): Patient is a pleasant 75-year-old female with a history of multiple stents and kidney stones who presented to the ED for difficulty emptying her bladder as well as some low back and flank pain. Patient had a broad differential which included but was not limited to obstructive uropathy, sepsis, UTI and constipation and few of the many. CT imaging showed good placement of her stent that was previously placed renal function was stable patient had positive nitrates cultures were sent off she was started antibiotics.   Patient not appear septic she did show a large stool burden which may be contributing to the patient's issue of emptying her bladder she was given stool softeners for home as well as

## 2023-09-16 LAB
MICROORGANISM SPEC CULT: NO GROWTH
SPECIMEN DESCRIPTION: NORMAL

## 2023-10-19 ENCOUNTER — HOSPITAL ENCOUNTER (OUTPATIENT)
Age: 43
Discharge: HOME | End: 2023-10-21
Payer: MEDICAID

## 2023-10-19 ENCOUNTER — HOSPITAL ENCOUNTER (OUTPATIENT)
Dept: GENERAL RADIOLOGY | Age: 43
Discharge: HOME | End: 2023-10-21
Payer: MEDICAID

## 2023-10-19 DIAGNOSIS — R52 PAIN: ICD-10-CM

## 2023-10-19 PROCEDURE — 73070 X-RAY EXAM OF ELBOW: CPT

## 2023-11-20 ENCOUNTER — OFFICE VISIT (OUTPATIENT)
Dept: ORTHOPEDIC SURGERY | Age: 43
End: 2023-11-20
Payer: MEDICAID

## 2023-11-20 VITALS — HEIGHT: 61 IN | WEIGHT: 215 LBS | BODY MASS INDEX: 40.59 KG/M2 | TEMPERATURE: 98 F

## 2023-11-20 DIAGNOSIS — M77.02 MEDIAL EPICONDYLITIS OF ELBOW, LEFT: ICD-10-CM

## 2023-11-20 DIAGNOSIS — M77.12 LEFT LATERAL EPICONDYLITIS: Primary | ICD-10-CM

## 2023-11-20 PROCEDURE — G8417 CALC BMI ABV UP PARAM F/U: HCPCS | Performed by: ORTHOPAEDIC SURGERY

## 2023-11-20 PROCEDURE — G8484 FLU IMMUNIZE NO ADMIN: HCPCS | Performed by: ORTHOPAEDIC SURGERY

## 2023-11-20 PROCEDURE — G8427 DOCREV CUR MEDS BY ELIG CLIN: HCPCS | Performed by: ORTHOPAEDIC SURGERY

## 2023-11-20 PROCEDURE — 99203 OFFICE O/P NEW LOW 30 MIN: CPT | Performed by: ORTHOPAEDIC SURGERY

## 2023-11-20 PROCEDURE — 4004F PT TOBACCO SCREEN RCVD TLK: CPT | Performed by: ORTHOPAEDIC SURGERY

## 2023-11-20 NOTE — PROGRESS NOTES
Chief Complaint   Patient presents with    Elbow Injury     Pt had a fall encounter 7 months ago. Pt has increased pain and soreness in the Left Elbow. Pt has crepitus within the Left Elbow with WB AROM. Pt has difficulty with AROM due to increased pain. Bernadette Hellerer  female  presents today for evaluation of her left elbow pain. The patient states the pain started  7 months ago. The pain is aching, sharp, shooting, and stabbing in nature. It is worse with activity and better with rest.  The patient does complain of night pain. The patient is right hand dominant. The patient is not working at this time. Her occupation is part time work. She has tried OTC medications, HEP with no relief.     Past Medical History:   Diagnosis Date    Anxiety     Arthritis     Asthma     Carpal tunnel syndrome, left     Depression     Diabetes mellitus (HCC)     PO    GERD (gastroesophageal reflux disease)     Headache     Herpes genitalia     no outbreaks as of 4/13/2021    Hyperlipidemia     Lower back pain     ruptured discs, lumbar    Seasonal allergies      Past Surgical History:   Procedure Laterality Date    ARM SURGERY Left 4/16/2021    LEFT ULNAR NERVE DECOMPRESSION AT THE ELBOW TRANSPOSITION performed by Zacarias Whitt MD at [facility] Right 9/27/2019    RIGHT CARPAL TUNNEL RELEASE ENDOSCOPIC performed by Zacarias Whitt MD at [facility] Left 4/16/2021    LEFT CARPAL TUNNEL RELEASE performed by Zacarias Whitt MD at [address] [place] [address] 9      x3    COLONOSCOPY      CYSTOSCOPY      DILATION AND CURETTAGE OF UTERUS      ENDOMETRIAL ABLATION      HYSTERECTOMY (CERVIX STATUS UNKNOWN)      HYSTEROSCOPY  01/18/2017    D & C    LEEP      LITHOTRIPSY      LITHOTRIPSY Right 5/22/2020    CYSTOSCOPY RETROGRADE PYELOGRAM LASER LITHOTRIPSY, URETEROSCOPY, RIGHT STRING STENT INSERTION performed by Darryle Moles Memo, DO at [address] [place] [address] Bilateral 9/28/2021

## 2023-12-04 ENCOUNTER — HOSPITAL ENCOUNTER (OUTPATIENT)
Dept: MRI IMAGING | Age: 43
Discharge: HOME | End: 2023-12-06
Attending: ORTHOPAEDIC SURGERY
Payer: MEDICAID

## 2023-12-04 DIAGNOSIS — M77.12 LEFT LATERAL EPICONDYLITIS: ICD-10-CM

## 2023-12-04 DIAGNOSIS — M77.02 MEDIAL EPICONDYLITIS OF ELBOW, LEFT: ICD-10-CM

## 2023-12-04 PROCEDURE — 73221 MRI JOINT UPR EXTREM W/O DYE: CPT

## 2024-01-01 PROCEDURE — 99285 EMERGENCY DEPT VISIT HI MDM: CPT

## 2024-01-01 RX ORDER — 0.9 % SODIUM CHLORIDE 0.9 %
1000 INTRAVENOUS SOLUTION INTRAVENOUS ONCE
Status: COMPLETED | OUTPATIENT
Start: 2024-01-01 | End: 2024-01-02

## 2024-01-01 RX ORDER — MORPHINE SULFATE 4 MG/ML
4 INJECTION, SOLUTION INTRAMUSCULAR; INTRAVENOUS ONCE
Status: COMPLETED | OUTPATIENT
Start: 2024-01-01 | End: 2024-01-02

## 2024-01-01 RX ORDER — ONDANSETRON 2 MG/ML
4 INJECTION INTRAMUSCULAR; INTRAVENOUS ONCE
Status: COMPLETED | OUTPATIENT
Start: 2024-01-01 | End: 2024-01-02

## 2024-01-02 ENCOUNTER — APPOINTMENT (OUTPATIENT)
Dept: CT IMAGING | Age: 44
End: 2024-01-02
Payer: MEDICAID

## 2024-01-02 ENCOUNTER — HOSPITAL ENCOUNTER (EMERGENCY)
Age: 44
Discharge: HOME | End: 2024-01-02
Attending: STUDENT IN AN ORGANIZED HEALTH CARE EDUCATION/TRAINING PROGRAM
Payer: MEDICAID

## 2024-01-02 VITALS
TEMPERATURE: 97.7 F | SYSTOLIC BLOOD PRESSURE: 128 MMHG | BODY MASS INDEX: 41.76 KG/M2 | DIASTOLIC BLOOD PRESSURE: 92 MMHG | RESPIRATION RATE: 10 BRPM | OXYGEN SATURATION: 95 % | WEIGHT: 221 LBS | HEART RATE: 97 BPM

## 2024-01-02 DIAGNOSIS — R10.9 FLANK PAIN: Primary | ICD-10-CM

## 2024-01-02 DIAGNOSIS — K59.00 CONSTIPATION, UNSPECIFIED CONSTIPATION TYPE: ICD-10-CM

## 2024-01-02 DIAGNOSIS — N20.0 NEPHROLITHIASIS: ICD-10-CM

## 2024-01-02 LAB
ALBUMIN SERPL-MCNC: 4.2 G/DL (ref 3.5–5.2)
ALP SERPL-CCNC: 136 U/L (ref 35–104)
ALT SERPL-CCNC: 22 U/L (ref 0–32)
ANION GAP SERPL CALCULATED.3IONS-SCNC: 12 MMOL/L (ref 7–16)
AST SERPL-CCNC: 12 U/L (ref 0–31)
BACTERIA URNS QL MICRO: ABNORMAL
BASOPHILS # BLD: 0.08 K/UL (ref 0–0.2)
BASOPHILS NFR BLD: 1 % (ref 0–2)
BILIRUB SERPL-MCNC: <0.2 MG/DL (ref 0–1.2)
BILIRUB UR QL STRIP: ABNORMAL
BUN SERPL-MCNC: 12 MG/DL (ref 6–20)
CALCIUM SERPL-MCNC: 10.1 MG/DL (ref 8.6–10.2)
CHLORIDE SERPL-SCNC: 103 MMOL/L (ref 98–107)
CLARITY UR: ABNORMAL
CO2 SERPL-SCNC: 24 MMOL/L (ref 22–29)
COLOR UR: YELLOW
CREAT SERPL-MCNC: 1 MG/DL (ref 0.5–1)
EOSINOPHIL # BLD: 0.11 K/UL (ref 0.05–0.5)
EOSINOPHILS RELATIVE PERCENT: 1 % (ref 0–6)
EPI CELLS #/AREA URNS HPF: ABNORMAL /HPF
ERYTHROCYTE [DISTWIDTH] IN BLOOD BY AUTOMATED COUNT: 13.2 % (ref 11.5–15)
GFR SERPL CREATININE-BSD FRML MDRD: >60 ML/MIN/1.73M2
GLUCOSE SERPL-MCNC: 173 MG/DL (ref 74–99)
GLUCOSE UR STRIP-MCNC: NEGATIVE MG/DL
HCT VFR BLD AUTO: 42.6 % (ref 34–48)
HGB BLD-MCNC: 14.1 G/DL (ref 11.5–15.5)
HGB UR QL STRIP.AUTO: NEGATIVE
IMM GRANULOCYTES # BLD AUTO: 0.04 K/UL (ref 0–0.58)
IMM GRANULOCYTES NFR BLD: 0 % (ref 0–5)
KETONES UR STRIP-MCNC: ABNORMAL MG/DL
LACTATE BLDV-SCNC: 1.3 MMOL/L (ref 0.5–2.2)
LEUKOCYTE ESTERASE UR QL STRIP: NEGATIVE
LIPASE SERPL-CCNC: 41 U/L (ref 13–60)
LYMPHOCYTES NFR BLD: 2.85 K/UL (ref 1.5–4)
LYMPHOCYTES RELATIVE PERCENT: 27 % (ref 20–42)
MCH RBC QN AUTO: 29.1 PG (ref 26–35)
MCHC RBC AUTO-ENTMCNC: 33.1 G/DL (ref 32–34.5)
MCV RBC AUTO: 88 FL (ref 80–99.9)
MONOCYTES NFR BLD: 0.71 K/UL (ref 0.1–0.95)
MONOCYTES NFR BLD: 7 % (ref 2–12)
NEUTROPHILS NFR BLD: 64 % (ref 43–80)
NEUTS SEG NFR BLD: 6.84 K/UL (ref 1.8–7.3)
NITRITE UR QL STRIP: NEGATIVE
PH UR STRIP: 5.5 [PH] (ref 5–9)
PLATELET # BLD AUTO: 314 K/UL (ref 130–450)
PMV BLD AUTO: 9.5 FL (ref 7–12)
POTASSIUM SERPL-SCNC: 3.8 MMOL/L (ref 3.5–5)
PROT SERPL-MCNC: 7.4 G/DL (ref 6.4–8.3)
PROT UR STRIP-MCNC: ABNORMAL MG/DL
RBC # BLD AUTO: 4.84 M/UL (ref 3.5–5.5)
RBC #/AREA URNS HPF: ABNORMAL /HPF
SODIUM SERPL-SCNC: 139 MMOL/L (ref 132–146)
SP GR UR STRIP: >1.03 (ref 1–1.03)
UROBILINOGEN UR STRIP-ACNC: 0.2 EU/DL (ref 0–1)
WBC #/AREA URNS HPF: ABNORMAL /HPF
WBC OTHER # BLD: 10.6 K/UL (ref 4.5–11.5)

## 2024-01-02 PROCEDURE — 96375 TX/PRO/DX INJ NEW DRUG ADDON: CPT

## 2024-01-02 PROCEDURE — 85025 COMPLETE CBC W/AUTO DIFF WBC: CPT

## 2024-01-02 PROCEDURE — 81001 URINALYSIS AUTO W/SCOPE: CPT

## 2024-01-02 PROCEDURE — 74177 CT ABD & PELVIS W/CONTRAST: CPT

## 2024-01-02 PROCEDURE — 83605 ASSAY OF LACTIC ACID: CPT

## 2024-01-02 PROCEDURE — 96374 THER/PROPH/DIAG INJ IV PUSH: CPT

## 2024-01-02 PROCEDURE — 80053 COMPREHEN METABOLIC PANEL: CPT

## 2024-01-02 PROCEDURE — 83690 ASSAY OF LIPASE: CPT

## 2024-01-02 PROCEDURE — 6360000002 HC RX W HCPCS: Performed by: NURSE PRACTITIONER

## 2024-01-02 PROCEDURE — 36415 COLL VENOUS BLD VENIPUNCTURE: CPT

## 2024-01-02 PROCEDURE — 2580000003 HC RX 258: Performed by: NURSE PRACTITIONER

## 2024-01-02 PROCEDURE — 96376 TX/PRO/DX INJ SAME DRUG ADON: CPT

## 2024-01-02 PROCEDURE — 6360000004 HC RX CONTRAST MEDICATION: Performed by: RADIOLOGY

## 2024-01-02 PROCEDURE — 6360000002 HC RX W HCPCS: Performed by: STUDENT IN AN ORGANIZED HEALTH CARE EDUCATION/TRAINING PROGRAM

## 2024-01-02 RX ORDER — POLYETHYLENE GLYCOL 3350 17 G/17G
17 POWDER, FOR SOLUTION ORAL DAILY
Qty: 510 G | Refills: 0 | Status: SHIPPED | OUTPATIENT
Start: 2024-01-02 | End: 2024-02-01

## 2024-01-02 RX ORDER — MORPHINE SULFATE 4 MG/ML
4 INJECTION, SOLUTION INTRAMUSCULAR; INTRAVENOUS ONCE
Status: COMPLETED | OUTPATIENT
Start: 2024-01-02 | End: 2024-01-02

## 2024-01-02 RX ORDER — DICYCLOMINE HYDROCHLORIDE 10 MG/1
10 CAPSULE ORAL 4 TIMES DAILY
Qty: 120 CAPSULE | Refills: 0 | Status: SHIPPED | OUTPATIENT
Start: 2024-01-02

## 2024-01-02 RX ADMIN — MORPHINE SULFATE 4 MG: 4 INJECTION, SOLUTION INTRAMUSCULAR; INTRAVENOUS at 02:33

## 2024-01-02 RX ADMIN — ONDANSETRON 4 MG: 2 INJECTION INTRAMUSCULAR; INTRAVENOUS at 02:32

## 2024-01-02 RX ADMIN — IOPAMIDOL 80 ML: 755 INJECTION, SOLUTION INTRAVENOUS at 04:12

## 2024-01-02 RX ADMIN — MORPHINE SULFATE 4 MG: 4 INJECTION, SOLUTION INTRAMUSCULAR; INTRAVENOUS at 04:52

## 2024-01-02 RX ADMIN — SODIUM CHLORIDE 1000 ML: 9 INJECTION, SOLUTION INTRAVENOUS at 02:31

## 2024-01-02 ASSESSMENT — PAIN DESCRIPTION - LOCATION
LOCATION: FLANK
LOCATION: FLANK

## 2024-01-02 ASSESSMENT — PAIN SCALES - GENERAL
PAINLEVEL_OUTOF10: 10
PAINLEVEL_OUTOF10: 8

## 2024-01-02 ASSESSMENT — PAIN DESCRIPTION - ORIENTATION
ORIENTATION: RIGHT
ORIENTATION: RIGHT

## 2024-01-02 NOTE — ED TRIAGE NOTES
Department of Emergency Medicine  FIRST PROVIDER TRIAGE NOTE             Independent MLP           1/1/24  10:18 PM EST    Date of Encounter: 1/1/24   MRN: [id number]      HPI: Marci Knowles is a 43 y.o. female who presents to the ED for Flank Pain (Right side radiates around to RLQ started yesterday, denies urinary symptoms)     ROS: Negative for cp, sob, or fever.    PE: Gen Appearance/Constitutional: alert  CV: tachycardia  GI: tender to palpation rlq       Initial Plan of Care: All treatment areas with department are currently occupied. Plan to order/Initiate the following while awaiting opening in ED: labs, imaging studies, analgesics, and IV fluids.  Initiate Treatment-Testing, Proceed toTreatment Area When Bed Available for ED Attending/MLP to Continue Care    Electronically signed by YESENIA Carey CNP   DD: 1/1/24

## 2024-01-02 NOTE — ED PROVIDER NOTES
[facility] EMERGENCY DEPARTMENT  EMERGENCY DEPARTMENT ENCOUNTER        Pt Name: Marci Knowles  MRN: [id number]  Birthdate 1980  Date of evaluation: 1/1/2024  Provider: Ezequiel Tapia II, DO  PCP: Sofie Zamudio APRN - NP  Note Started: 1:00 AM EST 1/2/24    CHIEF COMPLAINT       Chief Complaint   Patient presents with    Flank Pain     Right side radiates around to RLQ started yesterday, denies urinary symptoms       HISTORY OF PRESENT ILLNESS: 1 or more Elements            Marci Knowles is a 43 y.o. female with history of recurrent kidney stones, chronic back issues, who presents for complaint of right flank pain that started 2 days ago.  Patient denies any dysuria or hematuria.  Patient states she follows with the [facility] has had a lithotripsy done on the left side.  Patient states pain is in her right flank radiates around to her right lower quadrant abdomen.  Patient reports no improvement of symptoms with her Flexeril that she took today.  Patient denies any fevers, chills, chest pain, shortness of breath, nausea, vomiting, diarrhea, constipation.    Nursing Notes were all reviewed and agreed with or any disagreements were addressed in the HPI.      REVIEW OF EXTERNAL NOTE :       Records reviewed for medical history, surgical history, occasions.      Chart Review/External Note Review    Last Echo reviewed by Me:  No results found for: \"LVEF\", \"LVEFMODE\"          Controlled Substance Monitoring:    Acute and Chronic Pain Monitoring:        No data to display                    REVIEW OF SYSTEMS :      Positives and Pertinent negatives as per HPI.     SURGICAL HISTORY     Past Surgical History:   Procedure Laterality Date    ARM SURGERY Left 4/16/2021    LEFT ULNAR NERVE DECOMPRESSION AT THE ELBOW TRANSPOSITION performed by Freddy Ramos MD at [facility] OR    CARPAL TUNNEL RELEASE Right 9/27/2019    RIGHT CARPAL TUNNEL RELEASE ENDOSCOPIC performed by Freddy LOPEZ

## 2024-01-02 NOTE — DISCHARGE INSTRUCTIONS
Please follow-up with your primary care doctor for further evaluation of your symptoms and for ER follow-up.  Please return to the ER for any new or worsening symptoms.

## 2024-01-02 NOTE — DISCHARGE INSTR - COC
(if transferring to Rehab): {Prognosis:5676212048}    Recommended Labs or Other Treatments After Discharge: ***    Physician Certification: I certify the above information and transfer of Marci Knowles  is necessary for the continuing treatment of the diagnosis listed and that she requires {Admit to Appropriate Level of Care:26685} for {GREATER/LESS:592858633} 30 days.     Update Admission H&P: {CHP DME Changes in HandP:539088289}    PHYSICIAN SIGNATURE:  {Esignature:[id number]}

## 2024-01-08 ENCOUNTER — HOSPITAL ENCOUNTER (OUTPATIENT)
Dept: PHYSICAL THERAPY | Age: 44
Setting detail: THERAPIES SERIES
Discharge: HOME | End: 2024-01-08

## 2024-01-08 NOTE — PROGRESS NOTES
Physical Therapy Progress Note    Date: 2024  Patient Name: Marci Knolwes  : 1980   MRN: [id number]    Pt called to cancel PT appt  today. Family issues    Tigre Cordon, PT

## 2024-03-27 ENCOUNTER — HOSPITAL ENCOUNTER (OUTPATIENT)
Dept: ULTRASOUND IMAGING | Age: 44
Discharge: HOME | End: 2024-03-27
Attending: FAMILY MEDICINE
Payer: MEDICAID

## 2024-03-27 DIAGNOSIS — R10.9 ABDOMINAL PAIN, UNSPECIFIED ABDOMINAL LOCATION: ICD-10-CM

## 2024-03-27 PROCEDURE — 76705 ECHO EXAM OF ABDOMEN: CPT

## 2024-05-23 ENCOUNTER — HOSPITAL ENCOUNTER (EMERGENCY)
Age: 44
Discharge: HOME | End: 2024-05-23
Attending: STUDENT IN AN ORGANIZED HEALTH CARE EDUCATION/TRAINING PROGRAM
Payer: MEDICAID

## 2024-05-23 ENCOUNTER — APPOINTMENT (OUTPATIENT)
Dept: ULTRASOUND IMAGING | Age: 44
End: 2024-05-23
Payer: MEDICAID

## 2024-05-23 VITALS
HEART RATE: 92 BPM | BODY MASS INDEX: 47.77 KG/M2 | SYSTOLIC BLOOD PRESSURE: 111 MMHG | RESPIRATION RATE: 18 BRPM | DIASTOLIC BLOOD PRESSURE: 92 MMHG | HEIGHT: 61 IN | OXYGEN SATURATION: 98 % | TEMPERATURE: 97.8 F | WEIGHT: 253 LBS

## 2024-05-23 DIAGNOSIS — R74.01 TRANSAMINITIS: ICD-10-CM

## 2024-05-23 DIAGNOSIS — R10.9 ABDOMINAL PAIN, UNSPECIFIED ABDOMINAL LOCATION: Primary | ICD-10-CM

## 2024-05-23 DIAGNOSIS — K76.0 FATTY LIVER: ICD-10-CM

## 2024-05-23 LAB
ALBUMIN SERPL-MCNC: 4.3 G/DL (ref 3.5–5.2)
ALP SERPL-CCNC: 122 U/L (ref 35–104)
ALT SERPL-CCNC: 103 U/L (ref 0–32)
ANION GAP SERPL CALCULATED.3IONS-SCNC: 14 MMOL/L (ref 7–16)
AST SERPL-CCNC: 51 U/L (ref 0–31)
BASOPHILS # BLD: 0.06 K/UL (ref 0–0.2)
BASOPHILS NFR BLD: 1 % (ref 0–2)
BILIRUB SERPL-MCNC: 0.2 MG/DL (ref 0–1.2)
BILIRUB UR QL STRIP: NEGATIVE
BUN SERPL-MCNC: 13 MG/DL (ref 6–20)
CALCIUM SERPL-MCNC: 9.5 MG/DL (ref 8.6–10.2)
CHLORIDE SERPL-SCNC: 100 MMOL/L (ref 98–107)
CLARITY UR: CLEAR
CO2 SERPL-SCNC: 24 MMOL/L (ref 22–29)
COLOR UR: YELLOW
CREAT SERPL-MCNC: 1 MG/DL (ref 0.5–1)
EOSINOPHIL # BLD: 0.07 K/UL (ref 0.05–0.5)
EOSINOPHILS RELATIVE PERCENT: 1 % (ref 0–6)
EPI CELLS #/AREA URNS HPF: NORMAL /HPF
ERYTHROCYTE [DISTWIDTH] IN BLOOD BY AUTOMATED COUNT: 12.5 % (ref 11.5–15)
GFR, ESTIMATED: 68 ML/MIN/1.73M2
GLUCOSE SERPL-MCNC: 212 MG/DL (ref 74–99)
GLUCOSE UR STRIP-MCNC: NEGATIVE MG/DL
HCT VFR BLD AUTO: 37.6 % (ref 34–48)
HGB BLD-MCNC: 12.3 G/DL (ref 11.5–15.5)
HGB UR QL STRIP.AUTO: NEGATIVE
IMM GRANULOCYTES # BLD AUTO: 0.04 K/UL (ref 0–0.58)
IMM GRANULOCYTES NFR BLD: 1 % (ref 0–5)
KETONES UR STRIP-MCNC: NEGATIVE MG/DL
LEUKOCYTE ESTERASE UR QL STRIP: NEGATIVE
LIPASE SERPL-CCNC: 39 U/L (ref 13–60)
LYMPHOCYTES NFR BLD: 1.86 K/UL (ref 1.5–4)
LYMPHOCYTES RELATIVE PERCENT: 29 % (ref 20–42)
MCH RBC QN AUTO: 28.5 PG (ref 26–35)
MCHC RBC AUTO-ENTMCNC: 32.7 G/DL (ref 32–34.5)
MCV RBC AUTO: 87 FL (ref 80–99.9)
MONOCYTES NFR BLD: 0.48 K/UL (ref 0.1–0.95)
MONOCYTES NFR BLD: 7 % (ref 2–12)
NEUTROPHILS NFR BLD: 61 % (ref 43–80)
NEUTS SEG NFR BLD: 4 K/UL (ref 1.8–7.3)
NITRITE UR QL STRIP: NEGATIVE
PH UR STRIP: 5.5 [PH] (ref 5–9)
PLATELET # BLD AUTO: 312 K/UL (ref 130–450)
PMV BLD AUTO: 9.8 FL (ref 7–12)
POTASSIUM SERPL-SCNC: 4.2 MMOL/L (ref 3.5–5)
PROT SERPL-MCNC: 7.4 G/DL (ref 6.4–8.3)
PROT UR STRIP-MCNC: NEGATIVE MG/DL
RBC # BLD AUTO: 4.32 M/UL (ref 3.5–5.5)
RBC #/AREA URNS HPF: NORMAL /HPF
SODIUM SERPL-SCNC: 138 MMOL/L (ref 132–146)
SP GR UR STRIP: 1.02 (ref 1–1.03)
UROBILINOGEN UR STRIP-ACNC: 0.2 EU/DL (ref 0–1)
WBC #/AREA URNS HPF: NORMAL /HPF
WBC OTHER # BLD: 6.5 K/UL (ref 4.5–11.5)

## 2024-05-23 PROCEDURE — 85025 COMPLETE CBC W/AUTO DIFF WBC: CPT

## 2024-05-23 PROCEDURE — 80053 COMPREHEN METABOLIC PANEL: CPT

## 2024-05-23 PROCEDURE — 96374 THER/PROPH/DIAG INJ IV PUSH: CPT

## 2024-05-23 PROCEDURE — 96375 TX/PRO/DX INJ NEW DRUG ADDON: CPT

## 2024-05-23 PROCEDURE — 76705 ECHO EXAM OF ABDOMEN: CPT

## 2024-05-23 PROCEDURE — 83690 ASSAY OF LIPASE: CPT

## 2024-05-23 PROCEDURE — 6360000002 HC RX W HCPCS: Performed by: STUDENT IN AN ORGANIZED HEALTH CARE EDUCATION/TRAINING PROGRAM

## 2024-05-23 PROCEDURE — 99284 EMERGENCY DEPT VISIT MOD MDM: CPT

## 2024-05-23 PROCEDURE — 81001 URINALYSIS AUTO W/SCOPE: CPT

## 2024-05-23 RX ORDER — ONDANSETRON 2 MG/ML
4 INJECTION INTRAMUSCULAR; INTRAVENOUS ONCE
Status: COMPLETED | OUTPATIENT
Start: 2024-05-23 | End: 2024-05-23

## 2024-05-23 RX ORDER — ONDANSETRON 4 MG/1
4 TABLET, ORALLY DISINTEGRATING ORAL 3 TIMES DAILY PRN
Qty: 21 TABLET | Refills: 0 | Status: SHIPPED | OUTPATIENT
Start: 2024-05-23 | End: 2024-05-23

## 2024-05-23 RX ORDER — KETOROLAC TROMETHAMINE 15 MG/ML
15 INJECTION, SOLUTION INTRAMUSCULAR; INTRAVENOUS ONCE
Status: COMPLETED | OUTPATIENT
Start: 2024-05-23 | End: 2024-05-23

## 2024-05-23 RX ORDER — ONDANSETRON 4 MG/1
4 TABLET, ORALLY DISINTEGRATING ORAL 3 TIMES DAILY PRN
Qty: 21 TABLET | Refills: 0 | Status: SHIPPED | OUTPATIENT
Start: 2024-05-23

## 2024-05-23 RX ADMIN — KETOROLAC TROMETHAMINE 15 MG: 15 INJECTION, SOLUTION INTRAMUSCULAR; INTRAVENOUS at 16:28

## 2024-05-23 RX ADMIN — ONDANSETRON 4 MG: 2 INJECTION INTRAMUSCULAR; INTRAVENOUS at 16:28

## 2024-05-23 ASSESSMENT — LIFESTYLE VARIABLES
HOW OFTEN DO YOU HAVE A DRINK CONTAINING ALCOHOL: NEVER
HOW MANY STANDARD DRINKS CONTAINING ALCOHOL DO YOU HAVE ON A TYPICAL DAY: PATIENT DOES NOT DRINK

## 2024-05-23 ASSESSMENT — ENCOUNTER SYMPTOMS
ABDOMINAL PAIN: 1
NAUSEA: 1

## 2024-05-23 ASSESSMENT — PAIN SCALES - GENERAL: PAINLEVEL_OUTOF10: 8

## 2024-05-23 ASSESSMENT — PAIN DESCRIPTION - LOCATION: LOCATION: ABDOMEN

## 2024-05-23 NOTE — ED PROVIDER NOTES
[facility] EMERGENCY DEPARTMENT  EMERGENCY DEPARTMENT ENCOUNTER        Pt Name: Marci Knowles  MRN: [id number]  Birthdate 1980  Date of evaluation: 2024  Provider: Elida Espitia MD  PCP: Sofie Zamudio APRN - NP  Note Started: 4:45 PM EDT 24    HPI  43 y.o. female presenting for abdominal pain, nausea.  Patient states her quadrant pain has been present for 3 months, worsening over the past month.  She complains of a burning sensation of right upper quadrant that is extending past her abdomen and started radiating around her back.  She has had persistent nausea.  She has had ultrasound for this, which was negative.  She denies any fevers.      --------------------------------------------- PAST HISTORY ---------------------------------------------  Past Medical History:  has a past medical history of Anxiety, Arthritis, Asthma, Carpal tunnel syndrome, left, Depression, Diabetes mellitus (HCC), GERD (gastroesophageal reflux disease), Headache, Herpes genitalia, Hyperlipidemia, Lower back pain, and Seasonal allergies.    Past Surgical History:  has a past surgical history that includes LEEP;  section; Endometrial ablation; Tubal ligation; Dilation and curettage of uterus; Lithotripsy; Cystocopy; Colonoscopy; hysteroscopy (2017); other surgical history (Bilateral, 2017); Wrist arthroscopy (Right, 2019); Carpal tunnel release (Right, 2019); Hysterectomy; Lithotripsy (Right, 2020); Arm Surgery (Left, 2021); Carpal tunnel release (Left, 2021); Wrist arthroscopy (Left, 2021); and Nerve Block (Bilateral, 2021).    Social History:  reports that she has been smoking cigarettes. She has a 15.0 pack-year smoking history. She has never used smokeless tobacco. She reports that she does not drink alcohol and does not use drugs.    Family History: family history includes Arthritis in her mother and paternal uncle; Diabetes in her

## 2024-05-29 ENCOUNTER — TRANSCRIBE ORDERS (OUTPATIENT)
Dept: ADMINISTRATIVE | Age: 44
End: 2024-05-29

## 2024-05-29 DIAGNOSIS — R19.4 CHANGE IN BOWEL HABITS: ICD-10-CM

## 2024-05-29 DIAGNOSIS — R10.84 ABDOMINAL PAIN, GENERALIZED: Primary | ICD-10-CM

## 2024-05-29 DIAGNOSIS — R19.4 CHANGE IN BOWEL HABITS: Primary | ICD-10-CM

## 2024-05-29 DIAGNOSIS — R11.2 NAUSEA AND VOMITING, UNSPECIFIED VOMITING TYPE: ICD-10-CM

## 2024-05-29 DIAGNOSIS — R10.84 ABDOMINAL PAIN, GENERALIZED: ICD-10-CM

## 2024-06-06 ENCOUNTER — HOSPITAL ENCOUNTER (OUTPATIENT)
Dept: NUCLEAR MEDICINE | Age: 44
Discharge: HOME | End: 2024-06-06
Attending: INTERNAL MEDICINE
Payer: MEDICAID

## 2024-06-06 ENCOUNTER — HOSPITAL ENCOUNTER (OUTPATIENT)
Age: 44
Discharge: HOME | End: 2024-06-06
Payer: MEDICAID

## 2024-06-06 VITALS — BODY MASS INDEX: 47.8 KG/M2 | WEIGHT: 253 LBS

## 2024-06-06 DIAGNOSIS — R11.2 NAUSEA AND VOMITING, UNSPECIFIED VOMITING TYPE: ICD-10-CM

## 2024-06-06 DIAGNOSIS — R10.84 ABDOMINAL PAIN, GENERALIZED: ICD-10-CM

## 2024-06-06 DIAGNOSIS — R19.4 CHANGE IN BOWEL HABITS: ICD-10-CM

## 2024-06-06 LAB
ALBUMIN SERPL-MCNC: 4.2 G/DL (ref 3.5–5.2)
ALP SERPL-CCNC: 112 U/L (ref 35–104)
ALT SERPL-CCNC: 61 U/L (ref 0–32)
ANION GAP SERPL CALCULATED.3IONS-SCNC: 8 MMOL/L (ref 7–16)
AST SERPL-CCNC: 36 U/L (ref 0–31)
BASOPHILS # BLD: 0.04 K/UL (ref 0–0.2)
BASOPHILS NFR BLD: 1 % (ref 0–2)
BILIRUB SERPL-MCNC: 0.4 MG/DL (ref 0–1.2)
BUN SERPL-MCNC: 15 MG/DL (ref 6–20)
CALCIUM SERPL-MCNC: 9.4 MG/DL (ref 8.6–10.2)
CHLORIDE SERPL-SCNC: 103 MMOL/L (ref 98–107)
CO2 SERPL-SCNC: 28 MMOL/L (ref 22–29)
CREAT SERPL-MCNC: 1.1 MG/DL (ref 0.5–1)
EOSINOPHIL # BLD: 0.08 K/UL (ref 0.05–0.5)
EOSINOPHILS RELATIVE PERCENT: 1 % (ref 0–6)
ERYTHROCYTE [DISTWIDTH] IN BLOOD BY AUTOMATED COUNT: 13 % (ref 11.5–15)
FERRITIN SERPL-MCNC: 182 NG/ML
GFR, ESTIMATED: 62 ML/MIN/1.73M2
GLUCOSE SERPL-MCNC: 128 MG/DL (ref 74–99)
HCT VFR BLD AUTO: 38.4 % (ref 34–48)
HGB BLD-MCNC: 12.4 G/DL (ref 11.5–15.5)
IMM GRANULOCYTES # BLD AUTO: 0.04 K/UL (ref 0–0.58)
IMM GRANULOCYTES NFR BLD: 1 % (ref 0–5)
IRON SATN MFR SERPL: 24 % (ref 15–50)
IRON SERPL-MCNC: 80 UG/DL (ref 37–145)
LYMPHOCYTES NFR BLD: 1.52 K/UL (ref 1.5–4)
LYMPHOCYTES RELATIVE PERCENT: 24 % (ref 20–42)
MCH RBC QN AUTO: 29 PG (ref 26–35)
MCHC RBC AUTO-ENTMCNC: 32.3 G/DL (ref 32–34.5)
MCV RBC AUTO: 89.7 FL (ref 80–99.9)
MONOCYTES NFR BLD: 0.39 K/UL (ref 0.1–0.95)
MONOCYTES NFR BLD: 6 % (ref 2–12)
NEUTROPHILS NFR BLD: 68 % (ref 43–80)
NEUTS SEG NFR BLD: 4.35 K/UL (ref 1.8–7.3)
PLATELET # BLD AUTO: 255 K/UL (ref 130–450)
PMV BLD AUTO: 9.7 FL (ref 7–12)
POTASSIUM SERPL-SCNC: 4 MMOL/L (ref 3.5–5)
PROT SERPL-MCNC: 7.1 G/DL (ref 6.4–8.3)
RBC # BLD AUTO: 4.28 M/UL (ref 3.5–5.5)
SEND OUT REPORT: NORMAL
SODIUM SERPL-SCNC: 139 MMOL/L (ref 132–146)
TEST NAME: NORMAL
TIBC SERPL-MCNC: 336 UG/DL (ref 250–450)
TSH SERPL DL<=0.05 MIU/L-ACNC: 1.01 UIU/ML (ref 0.27–4.2)
WBC OTHER # BLD: 6.4 K/UL (ref 4.5–11.5)

## 2024-06-06 PROCEDURE — 83550 IRON BINDING TEST: CPT

## 2024-06-06 PROCEDURE — 6360000002 HC RX W HCPCS: Performed by: RADIOLOGY

## 2024-06-06 PROCEDURE — 82390 ASSAY OF CERULOPLASMIN: CPT

## 2024-06-06 PROCEDURE — 36415 COLL VENOUS BLD VENIPUNCTURE: CPT

## 2024-06-06 PROCEDURE — 2580000003 HC RX 258: Performed by: RADIOLOGY

## 2024-06-06 PROCEDURE — 86317 IMMUNOASSAY INFECTIOUS AGENT: CPT

## 2024-06-06 PROCEDURE — 87340 HEPATITIS B SURFACE AG IA: CPT

## 2024-06-06 PROCEDURE — 80053 COMPREHEN METABOLIC PANEL: CPT

## 2024-06-06 PROCEDURE — 86039 ANTINUCLEAR ANTIBODIES (ANA): CPT

## 2024-06-06 PROCEDURE — 85025 COMPLETE CBC W/AUTO DIFF WBC: CPT

## 2024-06-06 PROCEDURE — 78227 HEPATOBIL SYST IMAGE W/DRUG: CPT

## 2024-06-06 PROCEDURE — 86704 HEP B CORE ANTIBODY TOTAL: CPT

## 2024-06-06 PROCEDURE — 84443 ASSAY THYROID STIM HORMONE: CPT

## 2024-06-06 PROCEDURE — 82103 ALPHA-1-ANTITRYPSIN TOTAL: CPT

## 2024-06-06 PROCEDURE — A9537 TC99M MEBROFENIN: HCPCS | Performed by: RADIOLOGY

## 2024-06-06 PROCEDURE — 86038 ANTINUCLEAR ANTIBODIES: CPT

## 2024-06-06 PROCEDURE — 86376 MICROSOMAL ANTIBODY EACH: CPT

## 2024-06-06 PROCEDURE — 86803 HEPATITIS C AB TEST: CPT

## 2024-06-06 PROCEDURE — 82784 ASSAY IGA/IGD/IGG/IGM EACH: CPT

## 2024-06-06 PROCEDURE — 3430000000 HC RX DIAGNOSTIC RADIOPHARMACEUTICAL: Performed by: RADIOLOGY

## 2024-06-06 PROCEDURE — 83540 ASSAY OF IRON: CPT

## 2024-06-06 PROCEDURE — 83516 IMMUNOASSAY NONANTIBODY: CPT

## 2024-06-06 PROCEDURE — 86255 FLUORESCENT ANTIBODY SCREEN: CPT

## 2024-06-06 PROCEDURE — 82728 ASSAY OF FERRITIN: CPT

## 2024-06-06 RX ADMIN — SINCALIDE 2.3 MCG: 5 INJECTION, POWDER, LYOPHILIZED, FOR SOLUTION INTRAVENOUS at 13:30

## 2024-06-06 RX ADMIN — Medication 6 MILLICURIE: at 12:40

## 2024-06-07 LAB
ANA SER QL IA: NEGATIVE
HBV SURFACE AB SERPL IA-ACNC: 23.42 MIU/ML (ref 0–9.99)
HBV SURFACE AG SERPL QL IA: NONREACTIVE
HCV AB SERPL QL IA: NONREACTIVE
IGA SERPL-MCNC: 260 MG/DL (ref 70–400)
MITOCHONDRIA AB SER QL: NEGATIVE
SMA IGG SER-ACNC: NEGATIVE

## 2024-06-08 LAB
A1AT SERPL-MCNC: 136 MG/DL (ref 90–200)
CERULOPLASMIN SERPL-MCNC: 29 MG/DL (ref 16–45)
HBV CORE AB SER QL: NONREACTIVE

## 2024-06-09 LAB — LKM-1 IGG SER IA-ACNC: 1 U (ref 0–24.9)

## 2024-06-10 LAB
GLIADIN IGA SER IA-ACNC: 1.2 U/ML
GLIADIN IGG SER IA-ACNC: 4.8 U/ML
TISSUE TRANSGLUTAMINASE ANTIBODY IGG: 0.7 U/ML
TTG IGA SER IA-ACNC: 0.4 U/ML

## 2024-06-11 LAB
SEND OUT REPORT: NORMAL
TEST NAME: NORMAL

## 2024-06-20 ENCOUNTER — HOSPITAL ENCOUNTER (OUTPATIENT)
Dept: CT IMAGING | Age: 44
Discharge: HOME | End: 2024-06-20
Attending: INTERNAL MEDICINE
Payer: MEDICAID

## 2024-06-20 DIAGNOSIS — R11.2 NAUSEA AND VOMITING, UNSPECIFIED VOMITING TYPE: ICD-10-CM

## 2024-06-20 DIAGNOSIS — R19.4 CHANGE IN BOWEL HABITS: ICD-10-CM

## 2024-06-20 DIAGNOSIS — R10.84 ABDOMINAL PAIN, GENERALIZED: ICD-10-CM

## 2024-06-20 PROCEDURE — 74177 CT ABD & PELVIS W/CONTRAST: CPT

## 2024-06-20 PROCEDURE — 6360000004 HC RX CONTRAST MEDICATION: Performed by: RADIOLOGY

## 2024-06-20 RX ADMIN — IOPAMIDOL 93 ML: 755 INJECTION, SOLUTION INTRAVENOUS at 18:10

## 2024-07-15 ENCOUNTER — HOSPITAL ENCOUNTER (OUTPATIENT)
Dept: CT IMAGING | Age: 44
Discharge: HOME | End: 2024-07-15
Payer: MEDICAID

## 2024-07-15 DIAGNOSIS — M47.816 SPONDYLOSIS WITHOUT MYELOPATHY OR RADICULOPATHY, LUMBAR REGION: ICD-10-CM

## 2024-07-15 PROCEDURE — 72131 CT LUMBAR SPINE W/O DYE: CPT

## 2024-07-27 ENCOUNTER — APPOINTMENT (OUTPATIENT)
Dept: ULTRASOUND IMAGING | Age: 44
End: 2024-07-27
Payer: MEDICAID

## 2024-07-27 ENCOUNTER — HOSPITAL ENCOUNTER (EMERGENCY)
Age: 44
Discharge: HOME | End: 2024-07-27
Attending: STUDENT IN AN ORGANIZED HEALTH CARE EDUCATION/TRAINING PROGRAM
Payer: MEDICAID

## 2024-07-27 ENCOUNTER — APPOINTMENT (OUTPATIENT)
Dept: GENERAL RADIOLOGY | Age: 44
End: 2024-07-27
Payer: MEDICAID

## 2024-07-27 VITALS
DIASTOLIC BLOOD PRESSURE: 91 MMHG | SYSTOLIC BLOOD PRESSURE: 178 MMHG | OXYGEN SATURATION: 99 % | TEMPERATURE: 97.4 F | RESPIRATION RATE: 24 BRPM | HEART RATE: 93 BPM | WEIGHT: 238 LBS | BODY MASS INDEX: 44.97 KG/M2

## 2024-07-27 DIAGNOSIS — M79.604 RIGHT LEG PAIN: Primary | ICD-10-CM

## 2024-07-27 LAB
ANION GAP SERPL CALCULATED.3IONS-SCNC: 12 MMOL/L (ref 7–16)
BASOPHILS # BLD: 0.09 K/UL (ref 0–0.2)
BASOPHILS NFR BLD: 1 % (ref 0–2)
BUN SERPL-MCNC: 9 MG/DL (ref 6–20)
CALCIUM SERPL-MCNC: 9.4 MG/DL (ref 8.6–10.2)
CHLORIDE SERPL-SCNC: 100 MMOL/L (ref 98–107)
CO2 SERPL-SCNC: 25 MMOL/L (ref 22–29)
CREAT SERPL-MCNC: 1.1 MG/DL (ref 0.5–1)
EOSINOPHIL # BLD: 0.12 K/UL (ref 0.05–0.5)
EOSINOPHILS RELATIVE PERCENT: 1 % (ref 0–6)
ERYTHROCYTE [DISTWIDTH] IN BLOOD BY AUTOMATED COUNT: 13.2 % (ref 11.5–15)
GFR, ESTIMATED: 66 ML/MIN/1.73M2
GLUCOSE SERPL-MCNC: 124 MG/DL (ref 74–99)
HCT VFR BLD AUTO: 42.1 % (ref 34–48)
HGB BLD-MCNC: 14 G/DL (ref 11.5–15.5)
IMM GRANULOCYTES # BLD AUTO: 0.04 K/UL (ref 0–0.58)
IMM GRANULOCYTES NFR BLD: 0 % (ref 0–5)
LYMPHOCYTES NFR BLD: 1.97 K/UL (ref 1.5–4)
LYMPHOCYTES RELATIVE PERCENT: 20 % (ref 20–42)
MCH RBC QN AUTO: 28.8 PG (ref 26–35)
MCHC RBC AUTO-ENTMCNC: 33.3 G/DL (ref 32–34.5)
MCV RBC AUTO: 86.6 FL (ref 80–99.9)
MONOCYTES NFR BLD: 0.63 K/UL (ref 0.1–0.95)
MONOCYTES NFR BLD: 6 % (ref 2–12)
NEUTROPHILS NFR BLD: 72 % (ref 43–80)
NEUTS SEG NFR BLD: 7.18 K/UL (ref 1.8–7.3)
PLATELET # BLD AUTO: 376 K/UL (ref 130–450)
PMV BLD AUTO: 9.6 FL (ref 7–12)
POTASSIUM SERPL-SCNC: 4 MMOL/L (ref 3.5–5)
RBC # BLD AUTO: 4.86 M/UL (ref 3.5–5.5)
SODIUM SERPL-SCNC: 137 MMOL/L (ref 132–146)
WBC OTHER # BLD: 10 K/UL (ref 4.5–11.5)

## 2024-07-27 PROCEDURE — 96375 TX/PRO/DX INJ NEW DRUG ADDON: CPT

## 2024-07-27 PROCEDURE — 73502 X-RAY EXAM HIP UNI 2-3 VIEWS: CPT

## 2024-07-27 PROCEDURE — 99284 EMERGENCY DEPT VISIT MOD MDM: CPT

## 2024-07-27 PROCEDURE — 2580000003 HC RX 258: Performed by: STUDENT IN AN ORGANIZED HEALTH CARE EDUCATION/TRAINING PROGRAM

## 2024-07-27 PROCEDURE — 80048 BASIC METABOLIC PNL TOTAL CA: CPT

## 2024-07-27 PROCEDURE — 96374 THER/PROPH/DIAG INJ IV PUSH: CPT

## 2024-07-27 PROCEDURE — 85025 COMPLETE CBC W/AUTO DIFF WBC: CPT

## 2024-07-27 PROCEDURE — 6360000002 HC RX W HCPCS: Performed by: STUDENT IN AN ORGANIZED HEALTH CARE EDUCATION/TRAINING PROGRAM

## 2024-07-27 PROCEDURE — 6370000000 HC RX 637 (ALT 250 FOR IP): Performed by: STUDENT IN AN ORGANIZED HEALTH CARE EDUCATION/TRAINING PROGRAM

## 2024-07-27 PROCEDURE — 93971 EXTREMITY STUDY: CPT

## 2024-07-27 RX ORDER — MORPHINE SULFATE 4 MG/ML
4 INJECTION, SOLUTION INTRAMUSCULAR; INTRAVENOUS ONCE
Status: COMPLETED | OUTPATIENT
Start: 2024-07-27 | End: 2024-07-27

## 2024-07-27 RX ORDER — HYDROCODONE BITARTRATE AND ACETAMINOPHEN 5; 325 MG/1; MG/1
1 TABLET ORAL ONCE
Status: COMPLETED | OUTPATIENT
Start: 2024-07-27 | End: 2024-07-27

## 2024-07-27 RX ORDER — ONDANSETRON 2 MG/ML
4 INJECTION INTRAMUSCULAR; INTRAVENOUS ONCE
Status: COMPLETED | OUTPATIENT
Start: 2024-07-27 | End: 2024-07-27

## 2024-07-27 RX ORDER — 0.9 % SODIUM CHLORIDE 0.9 %
1000 INTRAVENOUS SOLUTION INTRAVENOUS ONCE
Status: COMPLETED | OUTPATIENT
Start: 2024-07-27 | End: 2024-07-27

## 2024-07-27 RX ORDER — METHOCARBAMOL 750 MG/1
750 TABLET, FILM COATED ORAL 4 TIMES DAILY
Qty: 40 TABLET | Refills: 0 | Status: SHIPPED | OUTPATIENT
Start: 2024-07-27 | End: 2024-08-06

## 2024-07-27 RX ORDER — HYDROCODONE BITARTRATE AND ACETAMINOPHEN 5; 325 MG/1; MG/1
1 TABLET ORAL EVERY 6 HOURS PRN
Qty: 12 TABLET | Refills: 0 | Status: SHIPPED | OUTPATIENT
Start: 2024-07-27 | End: 2024-07-30

## 2024-07-27 RX ADMIN — ONDANSETRON 4 MG: 2 INJECTION INTRAMUSCULAR; INTRAVENOUS at 18:31

## 2024-07-27 RX ADMIN — HYDROCODONE BITARTRATE AND ACETAMINOPHEN 1 TABLET: 5; 325 TABLET ORAL at 21:20

## 2024-07-27 RX ADMIN — SODIUM CHLORIDE 1000 ML: 9 INJECTION, SOLUTION INTRAVENOUS at 18:31

## 2024-07-27 RX ADMIN — MORPHINE SULFATE 4 MG: 4 INJECTION, SOLUTION INTRAMUSCULAR; INTRAVENOUS at 18:37

## 2024-07-27 ASSESSMENT — PAIN DESCRIPTION - LOCATION
LOCATION: LEG

## 2024-07-27 ASSESSMENT — PAIN DESCRIPTION - DESCRIPTORS: DESCRIPTORS: SHARP

## 2024-07-27 ASSESSMENT — PAIN DESCRIPTION - ORIENTATION
ORIENTATION: RIGHT
ORIENTATION: RIGHT

## 2024-07-27 ASSESSMENT — PAIN SCALES - GENERAL
PAINLEVEL_OUTOF10: 9
PAINLEVEL_OUTOF10: 8
PAINLEVEL_OUTOF10: 9

## 2024-07-27 ASSESSMENT — LIFESTYLE VARIABLES: HOW OFTEN DO YOU HAVE A DRINK CONTAINING ALCOHOL: NEVER

## 2024-07-27 NOTE — ED TRIAGE NOTES
Department of Emergency Medicine  FIRST PROVIDER TRIAGE NOTE             Independent MLP           7/27/24  4:18 PM EDT    Date of Encounter: 7/27/24   MRN: [id number]      HPI: Marci Knowles is a 43 y.o. female who presents to the ED for Leg Pain (right)  Patient denies injury.  She reports that the pain is going from the pelvic/groin region all the way down to the foot.  History of sciatica but does not feel that this is at this time.    ROS: Negative for cp, sob, abd pain, back pain, fever, cough, vomiting, diarrhea, urinary complaints, rash, headache, or dizziness.    PE: Gen Appearance/Constitutional: alert  CV: tachycardia  Pulm: CTA bilat  GI: soft and NT     Initial Plan of Care: All treatment areas with department are currently occupied. Plan to order/Initiate the following while awaiting opening in ED: labs, imaging studies, and analgesics.  Initiate Treatment-Testing, Proceed toTreatment Area When Bed Available for ED Attending/MLP to Continue Care    Electronically signed by HEATHER Galeano   DD: 7/27/24    
English

## 2024-07-27 NOTE — ED PROVIDER NOTES
[facility] EMERGENCY DEPARTMENT  EMERGENCY DEPARTMENT ENCOUNTER    Pt Name: Marci Knowles  MRN: [id number]  Birthdate 1980  Date of evaluation: 7/27/2024  Provider: Gilles Olvera MD  PCP: No primary care provider on file.  Note Started: 6:10 PM EDT 7/27/24    HPI     Patient is a 43 y.o. female presents with a chief complaint of   Chief Complaint   Patient presents with    Leg Pain     right   .    Patient presents for right leg pain.  Patient states that she has a history of back issues and over the past couple days have been having significant pain in the right hip that radiates into the groin.  Patient denies any fevers or chills.  Patient is nauseous but feels this is secondary to the pain.  Patient states that she has a history of sciatica, and has been taking gabapentin without improvement of her symptoms.  No changes in urinary or bowel habits.  No trauma.  No recent surgery, recent travel, history of blood clots in the past.    Nursing Notes were all reviewed and agreed with or any disagreements were addressed in the HPI.    History From: Patient    Review of Systems   Pertinent positives and negatives as per HPI.     Physical Exam  Vitals and nursing note reviewed.   Constitutional:       Appearance: She is well-developed.   HENT:      Head: Normocephalic and atraumatic.   Eyes:      Conjunctiva/sclera: Conjunctivae normal.   Cardiovascular:      Rate and Rhythm: Normal rate and regular rhythm.      Heart sounds: Normal heart sounds. No murmur heard.  Pulmonary:      Effort: Pulmonary effort is normal. No respiratory distress.      Breath sounds: Normal breath sounds. No wheezing or rales.   Abdominal:      General: Bowel sounds are normal.      Palpations: Abdomen is soft.      Tenderness: There is no abdominal tenderness. There is no guarding or rebound.   Musculoskeletal:         General: Tenderness present.      Cervical back: Normal range of motion and neck  ligation; Dilation and curettage of uterus; Lithotripsy; Cystocopy; Colonoscopy; hysteroscopy (01/18/2017); other surgical history (Bilateral, 03/01/2017); Wrist arthroscopy (Right, 9/27/2019); Carpal tunnel release (Right, 9/27/2019); Hysterectomy; Lithotripsy (Right, 5/22/2020); Arm Surgery (Left, 4/16/2021); Carpal tunnel release (Left, 4/16/2021); Wrist arthroscopy (Left, 4/16/2021); and Nerve Block (Bilateral, 9/28/2021).    Social History:  reports that she has been smoking cigarettes. She has a 15.0 pack-year smoking history. She has never used smokeless tobacco. She reports that she does not drink alcohol and does not use drugs.    Family History: family history includes Arthritis in her mother and paternal uncle; Diabetes in her father and mother; Heart Disease in her father and mother; High Blood Pressure in her father; Kidney Disease in her father; Other in her mother; Stroke in her father; Substance Abuse in her paternal cousin.     The patient’s home medications have been reviewed.    Allergies: Tape [adhesive tape], Trazodone and nefazodone, Zyrtec [cetirizine], and Tramadol    -------------------------------------------------- RESULTS -------------------------------------------------  Labs:  Results for orders placed or performed during the hospital encounter of 07/27/24   CBC with Auto Differential   Result Value Ref Range    WBC 10.0 4.5 - 11.5 k/uL    RBC 4.86 3.50 - 5.50 m/uL    Hemoglobin 14.0 11.5 - 15.5 g/dL    Hematocrit 42.1 34.0 - 48.0 %    MCV 86.6 80.0 - 99.9 fL    MCH 28.8 26.0 - 35.0 pg    MCHC 33.3 32.0 - 34.5 g/dL    RDW 13.2 11.5 - 15.0 %    Platelets 376 130 - 450 k/uL    MPV 9.6 7.0 - 12.0 fL    Neutrophils % 72 43.0 - 80.0 %    Lymphocytes % 20 20.0 - 42.0 %    Monocytes % 6 2.0 - 12.0 %    Eosinophils % 1 0 - 6 %    Basophils % 1 0.0 - 2.0 %    Immature Granulocytes % 0 0.0 - 5.0 %    Neutrophils Absolute 7.18 1.80 - 7.30 k/uL    Lymphocytes Absolute 1.97 1.50 - 4.00 k/uL

## 2024-08-19 PROCEDURE — 99284 EMERGENCY DEPT VISIT MOD MDM: CPT

## 2024-08-19 ASSESSMENT — PAIN - FUNCTIONAL ASSESSMENT: PAIN_FUNCTIONAL_ASSESSMENT: 0-10

## 2024-08-19 ASSESSMENT — PAIN SCALES - GENERAL: PAINLEVEL_OUTOF10: 10

## 2024-08-20 ENCOUNTER — APPOINTMENT (OUTPATIENT)
Dept: CT IMAGING | Age: 44
End: 2024-08-20
Payer: MEDICAID

## 2024-08-20 ENCOUNTER — HOSPITAL ENCOUNTER (EMERGENCY)
Age: 44
Discharge: HOME | End: 2024-08-20
Payer: MEDICAID

## 2024-08-20 VITALS
OXYGEN SATURATION: 92 % | RESPIRATION RATE: 18 BRPM | DIASTOLIC BLOOD PRESSURE: 98 MMHG | TEMPERATURE: 97.9 F | SYSTOLIC BLOOD PRESSURE: 142 MMHG | HEART RATE: 95 BPM

## 2024-08-20 DIAGNOSIS — N20.0 NEPHROLITHIASIS: Primary | ICD-10-CM

## 2024-08-20 DIAGNOSIS — N30.01 ACUTE CYSTITIS WITH HEMATURIA: ICD-10-CM

## 2024-08-20 LAB
ALBUMIN SERPL-MCNC: 4.2 G/DL (ref 3.5–5.2)
ALP SERPL-CCNC: 142 U/L (ref 35–104)
ALT SERPL-CCNC: 32 U/L (ref 0–32)
ANION GAP SERPL CALCULATED.3IONS-SCNC: 8 MMOL/L (ref 7–16)
AST SERPL-CCNC: 19 U/L (ref 0–31)
BACTERIA URNS QL MICRO: ABNORMAL
BASOPHILS # BLD: 0.05 K/UL (ref 0–0.2)
BASOPHILS NFR BLD: 1 % (ref 0–2)
BILIRUB SERPL-MCNC: <0.2 MG/DL (ref 0–1.2)
BILIRUB UR QL STRIP: NEGATIVE
BUN SERPL-MCNC: 10 MG/DL (ref 6–20)
CALCIUM SERPL-MCNC: 9.4 MG/DL (ref 8.6–10.2)
CHLORIDE SERPL-SCNC: 103 MMOL/L (ref 98–107)
CLARITY UR: ABNORMAL
CO2 SERPL-SCNC: 30 MMOL/L (ref 22–29)
COLOR UR: YELLOW
CREAT SERPL-MCNC: 1.1 MG/DL (ref 0.5–1)
EOSINOPHIL # BLD: 0.32 K/UL (ref 0.05–0.5)
EOSINOPHILS RELATIVE PERCENT: 4 % (ref 0–6)
EPI CELLS #/AREA URNS HPF: ABNORMAL /HPF
ERYTHROCYTE [DISTWIDTH] IN BLOOD BY AUTOMATED COUNT: 13.6 % (ref 11.5–15)
GFR, ESTIMATED: 64 ML/MIN/1.73M2
GLUCOSE SERPL-MCNC: 126 MG/DL (ref 74–99)
GLUCOSE UR STRIP-MCNC: NEGATIVE MG/DL
HCT VFR BLD AUTO: 38.8 % (ref 34–48)
HGB BLD-MCNC: 12.8 G/DL (ref 11.5–15.5)
HGB UR QL STRIP.AUTO: NEGATIVE
IMM GRANULOCYTES # BLD AUTO: <0.03 K/UL (ref 0–0.58)
IMM GRANULOCYTES NFR BLD: 0 % (ref 0–5)
KETONES UR STRIP-MCNC: NEGATIVE MG/DL
LEUKOCYTE ESTERASE UR QL STRIP: ABNORMAL
LYMPHOCYTES NFR BLD: 2.44 K/UL (ref 1.5–4)
LYMPHOCYTES RELATIVE PERCENT: 33 % (ref 20–42)
MCH RBC QN AUTO: 28.8 PG (ref 26–35)
MCHC RBC AUTO-ENTMCNC: 33 G/DL (ref 32–34.5)
MCV RBC AUTO: 87.4 FL (ref 80–99.9)
MONOCYTES NFR BLD: 0.55 K/UL (ref 0.1–0.95)
MONOCYTES NFR BLD: 8 % (ref 2–12)
NEUTROPHILS NFR BLD: 54 % (ref 43–80)
NEUTS SEG NFR BLD: 3.93 K/UL (ref 1.8–7.3)
NITRITE UR QL STRIP: POSITIVE
PH UR STRIP: 6 [PH] (ref 5–9)
PLATELET # BLD AUTO: 315 K/UL (ref 130–450)
PMV BLD AUTO: 9.5 FL (ref 7–12)
POTASSIUM SERPL-SCNC: 4 MMOL/L (ref 3.5–5)
PROT SERPL-MCNC: 7 G/DL (ref 6.4–8.3)
PROT UR STRIP-MCNC: ABNORMAL MG/DL
RBC # BLD AUTO: 4.44 M/UL (ref 3.5–5.5)
RBC #/AREA URNS HPF: ABNORMAL /HPF
SODIUM SERPL-SCNC: 141 MMOL/L (ref 132–146)
SP GR UR STRIP: 1.02 (ref 1–1.03)
UROBILINOGEN UR STRIP-ACNC: 0.2 EU/DL (ref 0–1)
WBC #/AREA URNS HPF: ABNORMAL /HPF
WBC OTHER # BLD: 7.3 K/UL (ref 4.5–11.5)

## 2024-08-20 PROCEDURE — 81001 URINALYSIS AUTO W/SCOPE: CPT

## 2024-08-20 PROCEDURE — 2580000003 HC RX 258: Performed by: PHYSICIAN ASSISTANT

## 2024-08-20 PROCEDURE — 6360000002 HC RX W HCPCS: Performed by: PHYSICIAN ASSISTANT

## 2024-08-20 PROCEDURE — 96375 TX/PRO/DX INJ NEW DRUG ADDON: CPT

## 2024-08-20 PROCEDURE — 80053 COMPREHEN METABOLIC PANEL: CPT

## 2024-08-20 PROCEDURE — 74176 CT ABD & PELVIS W/O CONTRAST: CPT

## 2024-08-20 PROCEDURE — 96374 THER/PROPH/DIAG INJ IV PUSH: CPT

## 2024-08-20 PROCEDURE — 87088 URINE BACTERIA CULTURE: CPT

## 2024-08-20 PROCEDURE — 85025 COMPLETE CBC W/AUTO DIFF WBC: CPT

## 2024-08-20 PROCEDURE — 87086 URINE CULTURE/COLONY COUNT: CPT

## 2024-08-20 PROCEDURE — 96376 TX/PRO/DX INJ SAME DRUG ADON: CPT

## 2024-08-20 RX ORDER — ONDANSETRON 4 MG/1
4 TABLET, ORALLY DISINTEGRATING ORAL 3 TIMES DAILY PRN
Qty: 21 TABLET | Refills: 0 | Status: SHIPPED | OUTPATIENT
Start: 2024-08-20

## 2024-08-20 RX ORDER — METOCLOPRAMIDE HYDROCHLORIDE 5 MG/ML
10 INJECTION INTRAMUSCULAR; INTRAVENOUS ONCE
Status: COMPLETED | OUTPATIENT
Start: 2024-08-20 | End: 2024-08-20

## 2024-08-20 RX ORDER — MORPHINE SULFATE 2 MG/ML
2 INJECTION, SOLUTION INTRAMUSCULAR; INTRAVENOUS ONCE
Status: COMPLETED | OUTPATIENT
Start: 2024-08-20 | End: 2024-08-20

## 2024-08-20 RX ORDER — OXYCODONE HYDROCHLORIDE AND ACETAMINOPHEN 5; 325 MG/1; MG/1
1 TABLET ORAL EVERY 6 HOURS PRN
Qty: 12 TABLET | Refills: 0 | Status: SHIPPED | OUTPATIENT
Start: 2024-08-20 | End: 2024-08-23

## 2024-08-20 RX ORDER — TAMSULOSIN HYDROCHLORIDE 0.4 MG/1
0.4 CAPSULE ORAL DAILY
Qty: 30 CAPSULE | Refills: 0 | Status: SHIPPED | OUTPATIENT
Start: 2024-08-20

## 2024-08-20 RX ORDER — 0.9 % SODIUM CHLORIDE 0.9 %
1000 INTRAVENOUS SOLUTION INTRAVENOUS ONCE
Status: COMPLETED | OUTPATIENT
Start: 2024-08-20 | End: 2024-08-20

## 2024-08-20 RX ORDER — MORPHINE SULFATE 4 MG/ML
4 INJECTION, SOLUTION INTRAMUSCULAR; INTRAVENOUS ONCE
Status: COMPLETED | OUTPATIENT
Start: 2024-08-20 | End: 2024-08-20

## 2024-08-20 RX ORDER — CEFDINIR 300 MG/1
300 CAPSULE ORAL 2 TIMES DAILY
Qty: 20 CAPSULE | Refills: 0 | Status: SHIPPED | OUTPATIENT
Start: 2024-08-20 | End: 2024-08-30

## 2024-08-20 RX ADMIN — WATER 2000 MG: 1 INJECTION INTRAMUSCULAR; INTRAVENOUS; SUBCUTANEOUS at 03:27

## 2024-08-20 RX ADMIN — SODIUM CHLORIDE 1000 ML: 9 INJECTION, SOLUTION INTRAVENOUS at 01:24

## 2024-08-20 RX ADMIN — METOCLOPRAMIDE 10 MG: 5 INJECTION, SOLUTION INTRAMUSCULAR; INTRAVENOUS at 01:18

## 2024-08-20 RX ADMIN — MORPHINE SULFATE 4 MG: 4 INJECTION, SOLUTION INTRAMUSCULAR; INTRAVENOUS at 01:19

## 2024-08-20 RX ADMIN — MORPHINE SULFATE 2 MG: 2 INJECTION, SOLUTION INTRAMUSCULAR; INTRAVENOUS at 03:26

## 2024-08-20 ASSESSMENT — PAIN DESCRIPTION - LOCATION
LOCATION: FLANK
LOCATION: FLANK

## 2024-08-20 ASSESSMENT — PAIN SCALES - GENERAL
PAINLEVEL_OUTOF10: 8
PAINLEVEL_OUTOF10: 7

## 2024-08-20 ASSESSMENT — PAIN DESCRIPTION - ORIENTATION
ORIENTATION: LEFT
ORIENTATION: LEFT

## 2024-08-20 ASSESSMENT — PAIN DESCRIPTION - DESCRIPTORS
DESCRIPTORS: ACHING;DISCOMFORT
DESCRIPTORS: ACHING;DISCOMFORT

## 2024-08-20 NOTE — ED PROVIDER NOTES
Independent FLIP Visit.    HPI:  24,   Time: 12:39 AM EDT         Marci Knowles is a 43 y.o. female presenting to the ED by private vehicle for left flank pain.  Patient has extensive history of nephrolithiasis and follows up with Dr. Ramachandran.  Today she had sudden onset left flank pain that is sharp in nature and radiates intermittently into her left groin.  She states this feels exactly like her previous kidney stones.  She also reports nausea but no emesis.  Has not tried any medications at home for her pain.  Nothing specifically makes it better or worse.  She also reports dysuria that started this evening as well.  Denies fever, chills, body aches, headache, dizziness, chest pain, shortness of breath, abdominal pain, emesis, diarrhea, constipation or hematuria    ROS:   Pertinent positives and negatives are stated within HPI, all other systems reviewed and are negative.  --------------------------------------------- PAST HISTORY ---------------------------------------------  Past Medical History:  has a past medical history of Anxiety, Arthritis, Asthma, Carpal tunnel syndrome, left, Depression, Diabetes mellitus (HCC), GERD (gastroesophageal reflux disease), Headache, Herpes genitalia, Hyperlipidemia, Lower back pain, and Seasonal allergies.    Past Surgical History:  has a past surgical history that includes LEEP;  section; Endometrial ablation; Tubal ligation; Dilation and curettage of uterus; Lithotripsy; Cystocopy; Colonoscopy; hysteroscopy (2017); other surgical history (Bilateral, 2017); Wrist arthroscopy (Right, 2019); Carpal tunnel release (Right, 2019); Hysterectomy; Lithotripsy (Right, 2020); Arm Surgery (Left, 2021); Carpal tunnel release (Left, 2021); Wrist arthroscopy (Left, 2021); and Nerve Block (Bilateral, 2021).    Social History:  reports that she has been smoking cigarettes. She has a 15 pack-year smoking history. She has never

## 2024-08-22 LAB
MICROORGANISM SPEC CULT: ABNORMAL
SERVICE CMNT-IMP: ABNORMAL
SPECIMEN DESCRIPTION: ABNORMAL

## 2024-11-18 ENCOUNTER — HOSPITAL ENCOUNTER (OUTPATIENT)
Dept: MRI IMAGING | Age: 44
Discharge: HOME | End: 2024-11-20
Payer: MEDICAID

## 2024-11-18 ENCOUNTER — HOSPITAL ENCOUNTER (OUTPATIENT)
Age: 44
Discharge: HOME | End: 2024-11-18
Payer: MEDICAID

## 2024-11-18 DIAGNOSIS — M25.551 RIGHT HIP PAIN: ICD-10-CM

## 2024-11-18 LAB
ALBUMIN SERPL-MCNC: 4.6 G/DL (ref 3.5–5.2)
ALP SERPL-CCNC: 113 U/L (ref 35–104)
ALT SERPL-CCNC: 55 U/L (ref 0–32)
ANION GAP SERPL CALCULATED.3IONS-SCNC: 10 MMOL/L (ref 7–16)
AST SERPL-CCNC: 27 U/L (ref 0–31)
BASOPHILS # BLD: 0.04 K/UL (ref 0–0.2)
BASOPHILS NFR BLD: 1 % (ref 0–2)
BILIRUB SERPL-MCNC: 0.3 MG/DL (ref 0–1.2)
BUN SERPL-MCNC: 20 MG/DL (ref 6–20)
CALCIUM SERPL-MCNC: 9.9 MG/DL (ref 8.6–10.2)
CHLORIDE SERPL-SCNC: 101 MMOL/L (ref 98–107)
CHOLEST SERPL-MCNC: 173 MG/DL
CO2 SERPL-SCNC: 26 MMOL/L (ref 22–29)
CREAT SERPL-MCNC: 1.2 MG/DL (ref 0.5–1)
EOSINOPHIL # BLD: 0.03 K/UL (ref 0.05–0.5)
EOSINOPHILS RELATIVE PERCENT: 0 % (ref 0–6)
ERYTHROCYTE [DISTWIDTH] IN BLOOD BY AUTOMATED COUNT: 13.5 % (ref 11.5–15)
GFR, ESTIMATED: 57 ML/MIN/1.73M2
GLUCOSE SERPL-MCNC: 123 MG/DL (ref 74–99)
HCT VFR BLD AUTO: 41.1 % (ref 34–48)
HDLC SERPL-MCNC: 43 MG/DL
HGB BLD-MCNC: 13.6 G/DL (ref 11.5–15.5)
IMM GRANULOCYTES # BLD AUTO: <0.03 K/UL (ref 0–0.58)
IMM GRANULOCYTES NFR BLD: 0 % (ref 0–5)
LDLC SERPL CALC-MCNC: 67 MG/DL
LYMPHOCYTES NFR BLD: 1.42 K/UL (ref 1.5–4)
LYMPHOCYTES RELATIVE PERCENT: 19 % (ref 20–42)
MCH RBC QN AUTO: 29 PG (ref 26–35)
MCHC RBC AUTO-ENTMCNC: 33.1 G/DL (ref 32–34.5)
MCV RBC AUTO: 87.6 FL (ref 80–99.9)
MONOCYTES NFR BLD: 0.33 K/UL (ref 0.1–0.95)
MONOCYTES NFR BLD: 4 % (ref 2–12)
NEUTROPHILS NFR BLD: 76 % (ref 43–80)
NEUTS SEG NFR BLD: 5.67 K/UL (ref 1.8–7.3)
PLATELET # BLD AUTO: 358 K/UL (ref 130–450)
PMV BLD AUTO: 9.6 FL (ref 7–12)
POTASSIUM SERPL-SCNC: 5.3 MMOL/L (ref 3.5–5)
PROT SERPL-MCNC: 7.3 G/DL (ref 6.4–8.3)
RBC # BLD AUTO: 4.69 M/UL (ref 3.5–5.5)
SODIUM SERPL-SCNC: 137 MMOL/L (ref 132–146)
TRIGL SERPL-MCNC: 314 MG/DL
TSH SERPL DL<=0.05 MIU/L-ACNC: 1.06 UIU/ML (ref 0.27–4.2)
VLDLC SERPL CALC-MCNC: 63 MG/DL
WBC OTHER # BLD: 7.5 K/UL (ref 4.5–11.5)

## 2024-11-18 PROCEDURE — 85025 COMPLETE CBC W/AUTO DIFF WBC: CPT

## 2024-11-18 PROCEDURE — 36415 COLL VENOUS BLD VENIPUNCTURE: CPT

## 2024-11-18 PROCEDURE — 84443 ASSAY THYROID STIM HORMONE: CPT

## 2024-11-18 PROCEDURE — 80061 LIPID PANEL: CPT

## 2024-11-18 PROCEDURE — 73721 MRI JNT OF LWR EXTRE W/O DYE: CPT

## 2024-11-18 PROCEDURE — 80053 COMPREHEN METABOLIC PANEL: CPT

## (undated) DEVICE — GAUZE,SPONGE,4"X4",16PLY,XRAY,STRL,LF: Brand: MEDLINE

## (undated) DEVICE — GOWN,SIRUS,FABRNF,L,20/CS: Brand: MEDLINE

## (undated) DEVICE — SOLUTION IV IRRIG WATER 1000ML POUR BRL 2F7114

## (undated) DEVICE — CRADLE ARM W8.75XH12.5XL16IN FOAM SUPP ELEVATION VENT

## (undated) DEVICE — SYRINGE 20ML LL S/C 50

## (undated) DEVICE — SUTURE FIBERLOOP 4-0 L10IN NONABSORBABLE BLU L17.9MM 3/8 AR722920

## (undated) DEVICE — TFCC MENDER DISPOSABLE SUTURE SYSTEM

## (undated) DEVICE — 1.5 FR, 120 CM, NITI TIPLESS STONE BASKET: Brand: HALO

## (undated) DEVICE — FIBER LSR 200U 50W ETFE SIL FLEXSHIELD HI PWR POLISHED OUTPT

## (undated) DEVICE — COVER HNDL LT DISP

## (undated) DEVICE — LOOP VES W25MM THK1MM MAXI RED SIL FLD REPELLENT 100 PER

## (undated) DEVICE — GAUZE SPONGES,8 PLY: Brand: CURITY

## (undated) DEVICE — GOWN,SIRUS,FABRNF,XL,20/CS: Brand: MEDLINE

## (undated) DEVICE — PADDING UNDERCAST W4INXL4YD COT FBR LO LINTING WYTEX

## (undated) DEVICE — PADDING,UNDERCAST,COTTON, 3X4YD STERILE: Brand: MEDLINE

## (undated) DEVICE — TOWEL,OR,DSP,ST,BLUE,STD,6/PK,12PK/CS: Brand: MEDLINE

## (undated) DEVICE — TOWEL OR BLUEE 16X26IN ST 8 PACK ORB08 16X26ORTWL

## (undated) DEVICE — GAUZE,SPONGE,4"X4",8PLY,STRL,LF,10/TRAY: Brand: MEDLINE

## (undated) DEVICE — GLOVE ORANGE PI 7   MSG9070

## (undated) DEVICE — SWABSTICK SURG PREP BENZOIN TINCTURE SINGLE ST

## (undated) DEVICE — [SMALL-JOINT FULL RADIUS CUTTER, ARTHROSCOPIC SHAVER BLADE,  DO NOT RESTERILIZE,  DO NOT USE IF PACKAGE IS DAMAGED,  KEEP DRY,  KEEP AWAY FROM SUNLIGHT]: Brand: FORMULA

## (undated) DEVICE — GAUZE,SPONGE,4"X4",12PLY,STERILE,LF,2'S: Brand: MEDLINE

## (undated) DEVICE — 3M™ STERI-DRAPE™ U-DRAPE 1015: Brand: STERI-DRAPE™

## (undated) DEVICE — NEEDLE SPNL 22GA L5IN BLK HUB S STL W/ QNCKE PNT W/OUT

## (undated) DEVICE — 1.4 MM INTEGRATED CABLE WAND ICW,                                    MICROBLATOR 30, 30 DEGREE: Brand: COBLATION

## (undated) DEVICE — STERILE HOOK LOCK LATEX FREE ELASTIC BANDAGE 4INX5YD: Brand: HOOK LOCK™

## (undated) DEVICE — TRAY  WRIST TOWER REUSABLE

## (undated) DEVICE — [AGGRESSIVE PLUS, SMALL-JOINT CUTTER, ARTHROSCOPIC SHAVER BLADE,  DO NOT RESTERILIZE,  DO NOT USE IF PACKAGE IS DAMAGED,  KEEP DRY,  KEEP AWAY FROM SUNLIGHT]: Brand: FORMULA

## (undated) DEVICE — 6 ML SYRINGE LUER-LOCK TIP: Brand: MONOJECT

## (undated) DEVICE — 3 ML SYRINGE LUER-LOCK TIP: Brand: MONOJECT

## (undated) DEVICE — STANDARD HYPODERMIC NEEDLE,POLYPROPYLENE HUB: Brand: MONOJECT

## (undated) DEVICE — CYSTO PACK: Brand: MEDLINE INDUSTRIES, INC.

## (undated) DEVICE — DOUBLE BASIN SET: Brand: MEDLINE INDUSTRIES, INC.

## (undated) DEVICE — MASTISOL ADHESIVE LIQ 2/3ML

## (undated) DEVICE — STANDARD (U) BLADE ASSEMBLY 6PK: Brand: MICROAIRE®

## (undated) DEVICE — STRIP,CLOSURE,WOUND,MEDI-STRIP,1/2X4: Brand: MEDLINE

## (undated) DEVICE — SPLINT ORTH W4XL15IN PLSTR OF PARIS LO EXOTHERM SMOOTH

## (undated) DEVICE — APPLICATOR MEDICATED 10.5 CC SOLUTION HI LT ORNG CHLORAPREP

## (undated) DEVICE — TRAY SET HAND REUSABLE

## (undated) DEVICE — GLOVE SURG SZ 65 THK91MIL LTX FREE SYN POLYISOPRENE

## (undated) DEVICE — SOLUTION IV IRRIG POUR BRL 0.9% SODIUM CHL 2F7124

## (undated) DEVICE — BAG SPECIMEN BIOHAZARD 6IN X 9IN

## (undated) DEVICE — MARKER,SKIN,WI/RULER AND LABELS: Brand: MEDLINE

## (undated) DEVICE — TUBING, SUCTION, 9/32" X 10', STRAIGHT: Brand: MEDLINE

## (undated) DEVICE — PADDING,UNDERCAST,COTTON, 4"X4YD STERILE: Brand: MEDLINE

## (undated) DEVICE — Z INACTIVE USE 2660664 SOLUTION IRRIG 3000ML 0.9% SOD CHL USP UROMATIC PLAS CONT

## (undated) DEVICE — MEDIA CONTRAST ISOVUE  300 10X50ML

## (undated) DEVICE — BANDAGE ADH W1XL3IN NAT FAB WVN FLX DURABLE N ADH PD SEAL

## (undated) DEVICE — Z INACTIVE USE 2635503 SOLUTION IRRIG 3000ML ST H2O USP UROMATIC PLAS CONT

## (undated) DEVICE — INSTRUMENT STRYKER SMALL JOINT/WRIST ARTHROSCOPY PAN REUSABLE

## (undated) DEVICE — SURGICAL PROCEDURE PACK HND

## (undated) DEVICE — NEEDLE HYPO 18GA L1.5IN PNK POLYPR HUB S STL THN WALL FILL

## (undated) DEVICE — CAMERA STRYKER 1488 HD GEN

## (undated) DEVICE — SOLUTION IV IRRIG LACTATED RINGERS 3000ML 2B7487

## (undated) DEVICE — GOWN,SIRUS,FABRNF,2XL,18/CS: Brand: MEDLINE

## (undated) DEVICE — 4-PORT MANIFOLD: Brand: NEPTUNE 2

## (undated) DEVICE — INTENDED FOR TISSUE SEPARATION, AND OTHER PROCEDURES THAT REQUIRE A SHARP SURGICAL BLADE TO PUNCTURE OR CUT.: Brand: BARD-PARKER ® STAINLESS STEEL BLADES

## (undated) DEVICE — NEEDLE HYPO 25GA L1.5IN BLU POLYPR HUB S STL REG BVL STR

## (undated) DEVICE — GAUZE 2X2IN ST BORDERED

## (undated) DEVICE — ZIMMER® STERILE DISPOSABLE TOURNIQUET CUFF WITH PLC, DUAL PORT, SINGLE BLADDER, 18 IN. (46 CM)

## (undated) DEVICE — GLOVE ORANGE PI 8 1/2   MSG9085

## (undated) DEVICE — TUBING PMP L16FT MAIN DISP FOR AR-6400 AR-6475

## (undated) DEVICE — BNDG,ELSTC,MATRIX,STRL,3"X5YD,LF,HOOK&LP: Brand: MEDLINE

## (undated) DEVICE — COUNTER NDL 10 COUNT HLD 20 FOAM BLK SGL MAG

## (undated) DEVICE — DRAPE,REIN 53X77,STERILE: Brand: MEDLINE

## (undated) DEVICE — GUIDEWIRE ENDOSCP L150CM DIA0.035IN TIP 3CM PTFE NIT

## (undated) DEVICE — 12 ML SYRINGE,LUER-LOCK TIP: Brand: MONOJECT

## (undated) DEVICE — DRAPE,U/ SHT,SPLIT,PLAS,STERIL: Brand: MEDLINE

## (undated) DEVICE — 22GX5" WHITACRE SPINAL NEEDLE: Brand: MEDLINE

## (undated) DEVICE — HOSE CONN FOR WST MGMT SYS NEPTUNE 2

## (undated) DEVICE — GLOVE SURG SZ 6 THK91MIL LTX FREE SYN POLYISOPRENE ANTI

## (undated) DEVICE — INSTRUMENT STRYKER SMALL JOINT/WRIST LENS REUSABLE

## (undated) DEVICE — Device: Brand: PORTEX

## (undated) DEVICE — INSTRUMENT RETRACTOR ARMY/NAVY REUSABLE

## (undated) DEVICE — INSTRUMENT STRYKER SHAVER REUSABLE

## (undated) DEVICE — 3M™ RED DOT™ MONITORING ELECTRODE WITH FOAM TAPE AND STICKY GEL 2560, 50/BAG, 20/CASE, 72/PLT: Brand: RED DOT™

## (undated) DEVICE — STONE RETRIEVAL BASKET: Brand: SEGURA HEMISPHERE

## (undated) DEVICE — BAG DRNGE COMB PK